# Patient Record
Sex: FEMALE | Race: WHITE | Employment: OTHER | URBAN - METROPOLITAN AREA
[De-identification: names, ages, dates, MRNs, and addresses within clinical notes are randomized per-mention and may not be internally consistent; named-entity substitution may affect disease eponyms.]

---

## 2017-03-21 PROBLEM — I67.2 CEREBRAL ATHEROSCLEROSIS: Status: ACTIVE | Noted: 2017-03-21

## 2017-03-21 PROBLEM — G62.89 PERIPHERAL NEUROPATHY DUE TO ISCHEMIA: Status: ACTIVE | Noted: 2017-03-21

## 2017-05-31 PROBLEM — Z86.73 HISTORY OF CEREBRAL INFARCTION: Status: ACTIVE | Noted: 2017-05-31

## 2018-01-16 PROBLEM — I63.9 CEREBROVASCULAR ACCIDENT (CVA) (HCC): Status: ACTIVE | Noted: 2018-01-16

## 2020-12-19 ENCOUNTER — HOSPITAL ENCOUNTER (EMERGENCY)
Age: 65
Discharge: ANOTHER HOSPITAL | End: 2020-12-19
Attending: EMERGENCY MEDICINE
Payer: COMMERCIAL

## 2020-12-19 ENCOUNTER — HOSPITAL ENCOUNTER (INPATIENT)
Age: 65
LOS: 2 days | Discharge: HOME | DRG: 065 | End: 2020-12-23
Attending: INTERNAL MEDICINE | Admitting: INTERNAL MEDICINE
Payer: COMMERCIAL

## 2020-12-19 ENCOUNTER — APPOINTMENT (OUTPATIENT)
Dept: CT IMAGING | Age: 65
End: 2020-12-19
Payer: COMMERCIAL

## 2020-12-19 VITALS
SYSTOLIC BLOOD PRESSURE: 160 MMHG | TEMPERATURE: 98 F | WEIGHT: 117 LBS | OXYGEN SATURATION: 98 % | HEIGHT: 66 IN | HEART RATE: 84 BPM | RESPIRATION RATE: 20 BRPM | DIASTOLIC BLOOD PRESSURE: 93 MMHG | BODY MASS INDEX: 18.8 KG/M2

## 2020-12-19 PROBLEM — R29.90 STROKE-LIKE SYMPTOMS: Status: ACTIVE | Noted: 2020-12-19

## 2020-12-19 PROBLEM — G62.9 PERIPHERAL NEUROPATHY: Status: ACTIVE | Noted: 2020-12-19

## 2020-12-19 PROBLEM — M62.838 MUSCLE SPASM: Status: ACTIVE | Noted: 2020-12-19

## 2020-12-19 PROBLEM — E03.9 HYPOTHYROIDISM: Status: ACTIVE | Noted: 2020-12-19

## 2020-12-19 PROBLEM — Z72.0 TOBACCO ABUSE: Status: ACTIVE | Noted: 2020-12-19

## 2020-12-19 LAB
ALBUMIN SERPL-MCNC: 4.3 G/DL (ref 3.5–5.2)
ALP BLD-CCNC: 66 U/L (ref 35–104)
ALT SERPL-CCNC: 12 U/L (ref 0–32)
ANION GAP SERPL CALCULATED.3IONS-SCNC: 8 MMOL/L (ref 7–16)
APTT: 34 SEC (ref 24.5–35.1)
AST SERPL-CCNC: 24 U/L (ref 0–31)
BASOPHILS ABSOLUTE: 0.03 E9/L (ref 0–0.2)
BASOPHILS RELATIVE PERCENT: 0.4 % (ref 0–2)
BILIRUB SERPL-MCNC: 0.5 MG/DL (ref 0–1.2)
BUN BLDV-MCNC: 13 MG/DL (ref 8–23)
CALCIUM SERPL-MCNC: 9.3 MG/DL (ref 8.6–10.2)
CHLORIDE BLD-SCNC: 107 MMOL/L (ref 98–107)
CO2: 25 MMOL/L (ref 22–29)
CREAT SERPL-MCNC: 0.7 MG/DL (ref 0.5–1)
EKG ATRIAL RATE: 78 BPM
EKG P AXIS: 87 DEGREES
EKG P-R INTERVAL: 128 MS
EKG Q-T INTERVAL: 392 MS
EKG QRS DURATION: 88 MS
EKG QTC CALCULATION (BAZETT): 446 MS
EKG R AXIS: 18 DEGREES
EKG T AXIS: 62 DEGREES
EKG VENTRICULAR RATE: 78 BPM
EOSINOPHILS ABSOLUTE: 0.03 E9/L (ref 0.05–0.5)
EOSINOPHILS RELATIVE PERCENT: 0.4 % (ref 0–6)
GFR AFRICAN AMERICAN: >60
GFR NON-AFRICAN AMERICAN: >60 ML/MIN/1.73
GLUCOSE BLD-MCNC: 114 MG/DL (ref 74–99)
HCT VFR BLD CALC: 43 % (ref 34–48)
HEMOGLOBIN: 14.1 G/DL (ref 11.5–15.5)
IMMATURE GRANULOCYTES #: 0.01 E9/L
IMMATURE GRANULOCYTES %: 0.1 % (ref 0–5)
INR BLD: 0.9
LYMPHOCYTES ABSOLUTE: 1.2 E9/L (ref 1.5–4)
LYMPHOCYTES RELATIVE PERCENT: 15.6 % (ref 20–42)
MCH RBC QN AUTO: 30.9 PG (ref 26–35)
MCHC RBC AUTO-ENTMCNC: 32.8 % (ref 32–34.5)
MCV RBC AUTO: 94.3 FL (ref 80–99.9)
METER GLUCOSE: 116 MG/DL (ref 74–99)
MONOCYTES ABSOLUTE: 0.37 E9/L (ref 0.1–0.95)
MONOCYTES RELATIVE PERCENT: 4.8 % (ref 2–12)
NEUTROPHILS ABSOLUTE: 6.04 E9/L (ref 1.8–7.3)
NEUTROPHILS RELATIVE PERCENT: 78.7 % (ref 43–80)
PDW BLD-RTO: 13.6 FL (ref 11.5–15)
PLATELET # BLD: 245 E9/L (ref 130–450)
PMV BLD AUTO: 9.9 FL (ref 7–12)
POTASSIUM REFLEX MAGNESIUM: 4 MMOL/L (ref 3.5–5)
PROTHROMBIN TIME: 10.8 SEC (ref 9.3–12.4)
RBC # BLD: 4.56 E12/L (ref 3.5–5.5)
SODIUM BLD-SCNC: 140 MMOL/L (ref 132–146)
TOTAL PROTEIN: 7 G/DL (ref 6.4–8.3)
TROPONIN: <0.01 NG/ML (ref 0–0.03)
WBC # BLD: 7.7 E9/L (ref 4.5–11.5)

## 2020-12-19 PROCEDURE — G0378 HOSPITAL OBSERVATION PER HR: HCPCS

## 2020-12-19 PROCEDURE — 6370000000 HC RX 637 (ALT 250 FOR IP): Performed by: NURSE PRACTITIONER

## 2020-12-19 PROCEDURE — 84484 ASSAY OF TROPONIN QUANT: CPT

## 2020-12-19 PROCEDURE — 80053 COMPREHEN METABOLIC PANEL: CPT

## 2020-12-19 PROCEDURE — 85025 COMPLETE CBC W/AUTO DIFF WBC: CPT

## 2020-12-19 PROCEDURE — 93010 ELECTROCARDIOGRAM REPORT: CPT | Performed by: INTERNAL MEDICINE

## 2020-12-19 PROCEDURE — G0379 DIRECT REFER HOSPITAL OBSERV: HCPCS

## 2020-12-19 PROCEDURE — 99284 EMERGENCY DEPT VISIT MOD MDM: CPT

## 2020-12-19 PROCEDURE — 70450 CT HEAD/BRAIN W/O DYE: CPT

## 2020-12-19 PROCEDURE — 2580000003 HC RX 258: Performed by: NURSE PRACTITIONER

## 2020-12-19 PROCEDURE — 85730 THROMBOPLASTIN TIME PARTIAL: CPT

## 2020-12-19 PROCEDURE — 93005 ELECTROCARDIOGRAM TRACING: CPT | Performed by: EMERGENCY MEDICINE

## 2020-12-19 PROCEDURE — 82962 GLUCOSE BLOOD TEST: CPT

## 2020-12-19 PROCEDURE — 85610 PROTHROMBIN TIME: CPT

## 2020-12-19 RX ORDER — PROMETHAZINE HYDROCHLORIDE 25 MG/1
12.5 TABLET ORAL EVERY 6 HOURS PRN
Status: DISCONTINUED | OUTPATIENT
Start: 2020-12-19 | End: 2020-12-23 | Stop reason: HOSPADM

## 2020-12-19 RX ORDER — LEVOCETIRIZINE DIHYDROCHLORIDE 5 MG/1
5 TABLET, FILM COATED ORAL NIGHTLY
COMMUNITY

## 2020-12-19 RX ORDER — GABAPENTIN 300 MG/1
300 CAPSULE ORAL EVERY 8 HOURS
Status: DISCONTINUED | OUTPATIENT
Start: 2020-12-19 | End: 2020-12-23 | Stop reason: HOSPADM

## 2020-12-19 RX ORDER — CETIRIZINE HYDROCHLORIDE 10 MG/1
10 TABLET ORAL NIGHTLY
Status: DISCONTINUED | OUTPATIENT
Start: 2020-12-19 | End: 2020-12-23 | Stop reason: HOSPADM

## 2020-12-19 RX ORDER — TIZANIDINE 4 MG/1
4 TABLET ORAL 3 TIMES DAILY
COMMUNITY

## 2020-12-19 RX ORDER — LEVOTHYROXINE SODIUM 0.05 MG/1
50 TABLET ORAL DAILY
Status: DISCONTINUED | OUTPATIENT
Start: 2020-12-20 | End: 2020-12-23 | Stop reason: HOSPADM

## 2020-12-19 RX ORDER — NABUMETONE 750 MG/1
750 TABLET, FILM COATED ORAL 2 TIMES DAILY
Status: ON HOLD | COMMUNITY
End: 2021-07-27 | Stop reason: HOSPADM

## 2020-12-19 RX ORDER — ACETAMINOPHEN 325 MG/1
650 TABLET ORAL EVERY 6 HOURS PRN
Status: DISCONTINUED | OUTPATIENT
Start: 2020-12-19 | End: 2020-12-23 | Stop reason: HOSPADM

## 2020-12-19 RX ORDER — ATORVASTATIN CALCIUM 40 MG/1
40 TABLET, FILM COATED ORAL NIGHTLY
Status: DISCONTINUED | OUTPATIENT
Start: 2020-12-19 | End: 2020-12-23 | Stop reason: HOSPADM

## 2020-12-19 RX ORDER — ASPIRIN 81 MG/1
81 TABLET ORAL 2 TIMES DAILY
Status: DISCONTINUED | OUTPATIENT
Start: 2020-12-19 | End: 2020-12-23 | Stop reason: HOSPADM

## 2020-12-19 RX ORDER — SODIUM CHLORIDE 0.9 % (FLUSH) 0.9 %
10 SYRINGE (ML) INJECTION EVERY 12 HOURS SCHEDULED
Status: DISCONTINUED | OUTPATIENT
Start: 2020-12-19 | End: 2020-12-23 | Stop reason: HOSPADM

## 2020-12-19 RX ORDER — POLYETHYLENE GLYCOL 3350 17 G/17G
17 POWDER, FOR SOLUTION ORAL DAILY PRN
Status: DISCONTINUED | OUTPATIENT
Start: 2020-12-19 | End: 2020-12-23 | Stop reason: HOSPADM

## 2020-12-19 RX ORDER — ACETAMINOPHEN 650 MG/1
650 SUPPOSITORY RECTAL EVERY 6 HOURS PRN
Status: DISCONTINUED | OUTPATIENT
Start: 2020-12-19 | End: 2020-12-23 | Stop reason: HOSPADM

## 2020-12-19 RX ORDER — TIZANIDINE 4 MG/1
4 TABLET ORAL EVERY 8 HOURS
Status: DISCONTINUED | OUTPATIENT
Start: 2020-12-19 | End: 2020-12-23 | Stop reason: HOSPADM

## 2020-12-19 RX ORDER — LEVOTHYROXINE SODIUM 0.05 MG/1
50 TABLET ORAL DAILY
COMMUNITY

## 2020-12-19 RX ORDER — SODIUM CHLORIDE 0.9 % (FLUSH) 0.9 %
10 SYRINGE (ML) INJECTION PRN
Status: DISCONTINUED | OUTPATIENT
Start: 2020-12-19 | End: 2020-12-23 | Stop reason: HOSPADM

## 2020-12-19 RX ORDER — ONDANSETRON 2 MG/ML
4 INJECTION INTRAMUSCULAR; INTRAVENOUS EVERY 6 HOURS PRN
Status: DISCONTINUED | OUTPATIENT
Start: 2020-12-19 | End: 2020-12-23 | Stop reason: HOSPADM

## 2020-12-19 RX ADMIN — SODIUM CHLORIDE, PRESERVATIVE FREE 10 ML: 5 INJECTION INTRAVENOUS at 21:02

## 2020-12-19 RX ADMIN — ASPIRIN 81 MG: 81 TABLET, COATED ORAL at 21:00

## 2020-12-19 RX ADMIN — ATORVASTATIN CALCIUM 40 MG: 40 TABLET, FILM COATED ORAL at 21:00

## 2020-12-19 RX ADMIN — GABAPENTIN 300 MG: 300 CAPSULE ORAL at 21:01

## 2020-12-19 RX ADMIN — TIZANIDINE 4 MG: 4 TABLET ORAL at 21:01

## 2020-12-19 RX ADMIN — CETIRIZINE HYDROCHLORIDE 10 MG: 10 TABLET, FILM COATED ORAL at 21:10

## 2020-12-19 ASSESSMENT — ENCOUNTER SYMPTOMS
VOMITING: 0
SORE THROAT: 0
NAUSEA: 0
SHORTNESS OF BREATH: 0
EYE PAIN: 0
BACK PAIN: 0
ABDOMINAL PAIN: 0

## 2020-12-19 ASSESSMENT — PAIN SCALES - GENERAL: PAINLEVEL_OUTOF10: 0

## 2020-12-19 NOTE — PROGRESS NOTES
Received call from [facility] @ 6916 with [facility]. Pt going to  # [phone]. Nurse to call report to: [phone]. RN Ricarda Goodell to review transport needs.

## 2020-12-19 NOTE — ED PROVIDER NOTES
Patient is 59-year-old female presenting to the emergency department with left arm weakness as well as left leg weakness. Patient has an NIH upon arrival 6. Last known well was Thursday night at 2300 hrs. This puts her out of the window for TPA and out of the window for [facility] alert. Patient states he had that weakness or noticed it starting Friday morning has not ambulated since then. Patient denies any nausea, vomiting, fever, chills. Nothing makes her symptoms worse. Nothing makes them better. She states she does not take any blood thinners and she has a past history of smoking 1/2 pack/day for over 30 years. Patient denies any pain anywhere. Review of Systems   Constitutional: Negative for chills and fever. HENT: Negative for ear pain and sore throat. Eyes: Negative for pain. Respiratory: Negative for shortness of breath. Cardiovascular: Negative for chest pain. Gastrointestinal: Negative for abdominal pain, nausea and vomiting. Genitourinary: Negative for flank pain and pelvic pain. Musculoskeletal: Negative for back pain and neck pain. Skin: Negative for rash. Neurological: Positive for weakness. Negative for facial asymmetry, light-headedness and headaches. Physical Exam  Vitals signs and nursing note reviewed. Constitutional:       Appearance: She is well-developed. HENT:      Head: Normocephalic and atraumatic. Right Ear: External ear normal.      Left Ear: External ear normal.      Nose: Nose normal.      Mouth/Throat:      Mouth: Mucous membranes are moist.      Pharynx: Oropharynx is clear. Eyes:      Pupils: Pupils are equal, round, and reactive to light. Neck:      Musculoskeletal: Normal range of motion and neck supple. Cardiovascular:      Rate and Rhythm: Normal rate and regular rhythm. Heart sounds: Normal heart sounds. Pulmonary:      Effort: Pulmonary effort is normal. No respiratory distress.       Breath sounds: Normal breath sounds. Abdominal:      Palpations: Abdomen is soft. Tenderness: There is no abdominal tenderness. Musculoskeletal:         General: No swelling or tenderness. Skin:     General: Skin is warm and dry. Findings: No rash. Neurological:      Mental Status: She is alert and oriented to person, place, and time. Cranial Nerves: No cranial nerve deficit. Sensory: No sensory deficit. Motor: Weakness present. Coordination: Finger-Nose-Finger Test abnormal and Heel to [employer] abnormal.      Comments: Patient has NIH stroke scale of 6. Patient has left leg weakness some effort antigravity left arm weakness. He has ataxia of the left leg left arm with sensation loss or change on the left arm left leg. Procedures     EKG: This EKG is signed and interpreted by me. Rate: 78  Rhythm: Sinus  Interpretation: no acute changes  Comparison: stable as compared to patient's most recent EKG       MDM  Number of Diagnoses or Management Options  Left arm weakness  Left leg weakness  Stroke-like symptoms  Diagnosis management comments: 1     Patient is a 58-year-old female with past history significant for multiple TIAs in the past presented to the emergency department due to new onset of left-sided weakness that she first noticed Friday morning. Last known well for the patient was Thursday night at 2300 hrs. Patient does have an NIH scale score of 6 due to the left arm left leg weakness with decreased sensation in the left arm left leg. Patient had no facial weakness or facial asymmetry or dysarthria. CT scan of the head was conducted due to the patient strokelike symptoms that did not find any abnormalities  However this is not sensitive enough to rule out a subacute stroke.   Due to this ongoing patient's weakness the patient will be admitted to the hospital and transferred down to [facility] for further neurology evaluation and likely MRI imaging for stroke evaluation. Patient was out of the window for TPA or [facility] alert. Laboratory evaluation did not find any signs of acute infection be causing the symptoms. In addition patient did not have any Covid-like symptoms. --------------------------------------------- PAST HISTORY ---------------------------------------------  Past Medical History:  has a past medical history of Anxiety, Arthritis, Cancer ([address] Headache, and Numbness and tingling of both legs. Past Surgical History:  has a past surgical history that includes Tonsillectomy; Appendectomy; and  section. Social History:  reports that she has been smoking cigarettes. She has been smoking about 0.50 packs per day. She has never used smokeless tobacco. She reports current alcohol use. She reports that she does not use drugs. Family History: family history is not on file. The patients home medications have been reviewed.     Allergies: Aspirin and Garlic oil    -------------------------------------------------- RESULTS -------------------------------------------------    Lab  Results for orders placed or performed during the hospital encounter of 20   CBC Auto Differential   Result Value Ref Range    WBC 7.7 4.5 - 11.5 E9/L    RBC 4.56 3.50 - 5.50 E12/L    Hemoglobin 14.1 11.5 - 15.5 g/dL    Hematocrit 43.0 34.0 - 48.0 %    MCV 94.3 80.0 - 99.9 fL    MCH 30.9 26.0 - 35.0 pg    MCHC 32.8 32.0 - 34.5 %    RDW 13.6 11.5 - 15.0 fL    Platelets 174 328 - 362 E9/L    MPV 9.9 7.0 - 12.0 fL    Neutrophils % 78.7 43.0 - 80.0 %    Immature Granulocytes % 0.1 0.0 - 5.0 %    Lymphocytes % 15.6 (L) 20.0 - 42.0 %    Monocytes % 4.8 2.0 - 12.0 %    Eosinophils % 0.4 0.0 - 6.0 %    Basophils % 0.4 0.0 - 2.0 %    Neutrophils Absolute 6.04 1.80 - 7.30 E9/L    Immature Granulocytes # 0.01 E9/L    Lymphocytes Absolute 1.20 (L) 1.50 - 4.00 E9/L    Monocytes Absolute 0.37 0.10 - 0.95 E9/L    Eosinophils Absolute 0.03 (L) 0.05 - 0.50 E9/L Saturation Interpretation: Normal      ------------------------------------------ PROGRESS NOTES ------------------------------------------  Re-evaluation(s):  Time: 1350. Patients symptoms show no change  Repeat physical examination is not changed    Time: 145. Patients symptoms show no change  Repeat physical examination is not changed    I have spoken with the patient and discussed todays results, in addition to providing specific details for the plan of care and counseling regarding the diagnosis and prognosis. Their questions are answered at this time and they are agreeable with the plan. I have discussed the risks and benefits of transfer and they wish to proceed with the transfer. --------------------------------- ADDITIONAL PROVIDER NOTES ---------------------------------  Consultations:  Spoke with Dr. Saqib Shahid (Medicine). Discussed case. They will admit this patient. Reason for transfer: Neurologic Evaluation and treatment. This patient's ED course included: a personal history and physicial examination, re-evaluation prior to disposition, multiple bedside re-evaluations, IV medications, cardiac monitoring, continuous pulse oximetry and complex medical decision making and emergency management    This patient has remained hemodynamically stable and been closely monitored during their ED course. Please note that the withdrawal or failure to initiate urgent interventions for this patient would likely result in a life threatening deterioration or permanent disability. Clinical Impression  1. Stroke-like symptoms    2. Left arm weakness    3. Left leg weakness          Disposition  Patient's disposition: Transfer to [place] [address].  Transferred by: Dr. Gayle Yusuf . Patient's condition is stable.                   Ramón Osorio DO  Resident  12/19/20 8011

## 2020-12-19 NOTE — ED NOTES
@1922 [facility] center was notified to transfer the patient to [facility]  12/19/20 0413

## 2020-12-20 ENCOUNTER — APPOINTMENT (OUTPATIENT)
Dept: ULTRASOUND IMAGING | Age: 65
DRG: 065 | End: 2020-12-20
Attending: INTERNAL MEDICINE
Payer: COMMERCIAL

## 2020-12-20 ENCOUNTER — APPOINTMENT (OUTPATIENT)
Dept: MRI IMAGING | Age: 65
DRG: 065 | End: 2020-12-20
Attending: INTERNAL MEDICINE
Payer: COMMERCIAL

## 2020-12-20 PROBLEM — I63.81 RIGHT THALAMIC STROKE (HCC): Status: ACTIVE | Noted: 2018-01-16

## 2020-12-20 LAB
ANION GAP SERPL CALCULATED.3IONS-SCNC: 12 MMOL/L (ref 7–16)
BUN BLDV-MCNC: 17 MG/DL (ref 8–23)
CALCIUM SERPL-MCNC: 9 MG/DL (ref 8.6–10.2)
CHLORIDE BLD-SCNC: 106 MMOL/L (ref 98–107)
CHOLESTEROL, TOTAL: 193 MG/DL (ref 0–199)
CO2: 25 MMOL/L (ref 22–29)
CREAT SERPL-MCNC: 0.9 MG/DL (ref 0.5–1)
GFR AFRICAN AMERICAN: >60
GFR NON-AFRICAN AMERICAN: >60 ML/MIN/1.73
GLUCOSE BLD-MCNC: 77 MG/DL (ref 74–99)
HBA1C MFR BLD: 5.4 % (ref 4–5.6)
HCT VFR BLD CALC: 42.5 % (ref 34–48)
HDLC SERPL-MCNC: 62 MG/DL
HEMOGLOBIN: 14.1 G/DL (ref 11.5–15.5)
LDL CHOLESTEROL CALCULATED: 103 MG/DL (ref 0–99)
MCH RBC QN AUTO: 31.1 PG (ref 26–35)
MCHC RBC AUTO-ENTMCNC: 33.2 % (ref 32–34.5)
MCV RBC AUTO: 93.6 FL (ref 80–99.9)
PDW BLD-RTO: 13.4 FL (ref 11.5–15)
PLATELET # BLD: 242 E9/L (ref 130–450)
PMV BLD AUTO: 10.6 FL (ref 7–12)
POTASSIUM REFLEX MAGNESIUM: 3.7 MMOL/L (ref 3.5–5)
RBC # BLD: 4.54 E12/L (ref 3.5–5.5)
SODIUM BLD-SCNC: 143 MMOL/L (ref 132–146)
TRIGL SERPL-MCNC: 139 MG/DL (ref 0–149)
VLDLC SERPL CALC-MCNC: 28 MG/DL
WBC # BLD: 5.9 E9/L (ref 4.5–11.5)

## 2020-12-20 PROCEDURE — 6370000000 HC RX 637 (ALT 250 FOR IP): Performed by: NURSE PRACTITIONER

## 2020-12-20 PROCEDURE — G0378 HOSPITAL OBSERVATION PER HR: HCPCS

## 2020-12-20 PROCEDURE — 80048 BASIC METABOLIC PNL TOTAL CA: CPT

## 2020-12-20 PROCEDURE — 6370000000 HC RX 637 (ALT 250 FOR IP): Performed by: PSYCHIATRY & NEUROLOGY

## 2020-12-20 PROCEDURE — 97162 PT EVAL MOD COMPLEX 30 MIN: CPT | Performed by: PHYSICAL THERAPIST

## 2020-12-20 PROCEDURE — 80061 LIPID PANEL: CPT

## 2020-12-20 PROCEDURE — 97530 THERAPEUTIC ACTIVITIES: CPT | Performed by: PHYSICAL THERAPIST

## 2020-12-20 PROCEDURE — 85027 COMPLETE CBC AUTOMATED: CPT

## 2020-12-20 PROCEDURE — 36415 COLL VENOUS BLD VENIPUNCTURE: CPT

## 2020-12-20 PROCEDURE — 99221 1ST HOSP IP/OBS SF/LOW 40: CPT | Performed by: PSYCHIATRY & NEUROLOGY

## 2020-12-20 PROCEDURE — 93880 EXTRACRANIAL BILAT STUDY: CPT

## 2020-12-20 PROCEDURE — 93880 EXTRACRANIAL BILAT STUDY: CPT | Performed by: RADIOLOGY

## 2020-12-20 PROCEDURE — 70551 MRI BRAIN STEM W/O DYE: CPT

## 2020-12-20 PROCEDURE — 83036 HEMOGLOBIN GLYCOSYLATED A1C: CPT

## 2020-12-20 PROCEDURE — 2580000003 HC RX 258: Performed by: NURSE PRACTITIONER

## 2020-12-20 PROCEDURE — 97166 OT EVAL MOD COMPLEX 45 MIN: CPT | Performed by: OCCUPATIONAL THERAPIST

## 2020-12-20 PROCEDURE — 97112 NEUROMUSCULAR REEDUCATION: CPT | Performed by: OCCUPATIONAL THERAPIST

## 2020-12-20 RX ORDER — CLOPIDOGREL BISULFATE 75 MG/1
75 TABLET ORAL DAILY
Status: DISCONTINUED | OUTPATIENT
Start: 2020-12-20 | End: 2020-12-23 | Stop reason: HOSPADM

## 2020-12-20 RX ADMIN — GABAPENTIN 300 MG: 300 CAPSULE ORAL at 14:42

## 2020-12-20 RX ADMIN — SODIUM CHLORIDE, PRESERVATIVE FREE 10 ML: 5 INJECTION INTRAVENOUS at 20:14

## 2020-12-20 RX ADMIN — TIZANIDINE 4 MG: 4 TABLET ORAL at 03:55

## 2020-12-20 RX ADMIN — CLOPIDOGREL 75 MG: 75 TABLET, FILM COATED ORAL at 14:42

## 2020-12-20 RX ADMIN — TIZANIDINE 4 MG: 4 TABLET ORAL at 20:14

## 2020-12-20 RX ADMIN — SODIUM CHLORIDE, PRESERVATIVE FREE 10 ML: 5 INJECTION INTRAVENOUS at 08:45

## 2020-12-20 RX ADMIN — ATORVASTATIN CALCIUM 40 MG: 40 TABLET, FILM COATED ORAL at 20:14

## 2020-12-20 RX ADMIN — TIZANIDINE 4 MG: 4 TABLET ORAL at 14:42

## 2020-12-20 RX ADMIN — LEVOTHYROXINE SODIUM 50 MCG: 0.05 TABLET ORAL at 06:36

## 2020-12-20 RX ADMIN — ASPIRIN 81 MG: 81 TABLET, COATED ORAL at 08:43

## 2020-12-20 RX ADMIN — ASPIRIN 81 MG: 81 TABLET, COATED ORAL at 20:14

## 2020-12-20 RX ADMIN — CETIRIZINE HYDROCHLORIDE 10 MG: 10 TABLET, FILM COATED ORAL at 20:14

## 2020-12-20 RX ADMIN — GABAPENTIN 300 MG: 300 CAPSULE ORAL at 20:15

## 2020-12-20 RX ADMIN — GABAPENTIN 300 MG: 300 CAPSULE ORAL at 03:55

## 2020-12-20 ASSESSMENT — PAIN SCALES - GENERAL: PAINLEVEL_OUTOF10: 0

## 2020-12-20 NOTE — PLAN OF CARE
Problem: HEMODYNAMIC STATUS  Goal: Patient has stable vital signs and fluid balance  Outcome: Met This Shift     Problem: ACTIVITY INTOLERANCE/IMPAIRED MOBILITY  Goal: Mobility/activity is maintained at optimum level for patient  Outcome: Met This Shift     Problem: COMMUNICATION IMPAIRMENT  Goal: Ability to express needs and understand communication  Outcome: Met This Shift     Problem: Skin Integrity:  Goal: Will show no infection signs and symptoms  Description: Will show no infection signs and symptoms  Outcome: Met This Shift  Goal: Absence of new skin breakdown  Description: Absence of new skin breakdown  Outcome: Met This Shift

## 2020-12-20 NOTE — PROGRESS NOTES
Physical Therapy    Physical Therapy Initial Assessment     Name: Sadiq Santamaria  : 1955  MRN: [id number]    Referring Provider:  KAMI Loredo CNP    Date of Service: 2020    Evaluating PT:  Rocael Nida PT, DPT [id number]      Room #:  5180/8414-Y  Diagnosis:  Stroke-like symptoms  PMHx/PSHx:  Arthritis, anxiety, numbness and tingling of B LEs, CA, headaches  Procedure/Surgery:  None this admission  Precautions:  Falls, severe L UE and L LE coordination impairments, bed alarm  Equipment Needs:  TBD    SUBJECTIVE:    Pt lives with her nephew in a first floor apartment with 4 stairs down to enter and no rail through rear, 1 rail through front entrance. Bed is on first floor and bath is on first floor. Pt ambulated with no AD prior to admission. Pt reports full independence with mobility and ADLs at baseline    OBJECTIVE:   Initial Evaluation  Date: 20 Treatment Short Term/ Long Term   Goals   AM-PAC 6 Clicks 92/84     Was pt agreeable to Eval/treatment? yes     Does pt have pain? No c/o pain     Bed Mobility  Rolling: SBA  Supine to sit: SBA  Sit to supine: Min A  Scooting: SBA  Rolling: Mod Independent   Supine to sit: Mod Independent   Sit to supine: Mod Independent   Scooting: Mod Independent    Transfers Sit to stand: Mod A  Stand to sit: Mod A  Stand pivot: NT  Sit to stand: Supervision  Stand to sit: Supervision  Stand pivot: Supervision with AAD   Ambulation    2 side steps with no AD and Max A  50 feet with AAD with Supervision   Stair negotiation: ascended and descended  NT  TBD   ROM BUE:  WNL  BLE:  WNL     Strength BUE:  WNL  R LE:  4+/5  L LE: 4/5     Balance Sitting EOB:  CGA  Dynamic Standing:   Max A  Sitting EOB:  Independent  Dynamic Standing:  Supervision with AAD     Pt is A & O x 4  Sensation: decreased sensation to L LE- absent light touch distal to knee, diminished proximal to knee to lateral hip  Edema:  Unremarkable Coordination: R UE and R LE intact with finger to nose and tracing square on floor with R LE. Substantially impaired accuracy and decreased speed with both finger to nose and tracing square with L UE and LE respectively.     Vitals:  Blood Pressure at rest 157/92   Heart Rate at rest 82   SPO2 at rest 96% on room air     Therapeutic Exercises:    Pt educated in slow, controlled ankle ROM and heel slides for improved coordination of movements while in bed between sessions- completed 5x with AAROM for technique  Pt encouraged to use L UE as much as possible with functional tasks such as feeding and object retrieval to facilitate improved coorindation    Patient education  Pt educated on role of therapy, objective findings, safety with transfers and mobility, coordination training tasks     Patient response to education:   Pt verbalized understanding Pt demonstrated skill Pt requires further education in this area   yes yes yes     ASSESSMENT: Patient and or family understand(s) diagnosis, prognosis, and plan of care. yes    PLAN OF CARE:    Current Treatment Recommendations   [x] Strengthening     [x] ROM   [x] Balance Training   [x] Endurance Training   [x] Transfer Training   [x] Gait Training   [x] Stair Training   [x] Positioning   [x] Safety and Education Training   [x] Patient/Caregiver Education   [] HEP  [] Other       PT care will be provided in accordance with the objectives noted above. Whenever appropriate, clear delegation orders will be provided for nursing staff. Exercises and functional mobility practice will be used as well as appropriate assistive devices or modalities to obtain goals. Patient and family education will also be administered as needed. Frequency of treatments: 2-5x/week x 7-10 days. Time in  0741  Time out  0801    Total Treatment Time  10 minutes     Evaluation Time includes thorough review of current medical information, gathering information on past medical history/social history and prior level of function, completion of standardized testing/informal observation of tasks, assessment of data and education on plan of care and goals.     CPT codes:  [] Low Complexity PT evaluation 89461  [x] Moderate Complexity PT evaluation 55222  [] High Complexity PT evaluation 73338  [] PT Re-evaluation 47942  [] Gait training 59882 0 minutes  [] Manual therapy 43057 0 minutes  [x] Therapeutic activities 43758 10 minutes  [] Therapeutic exercises 07841 0 minutes  [] Neuromuscular reeducation 91082 0 minutes     Juan Francisco Gutiérrez, PT, DPT  [id number]

## 2020-12-20 NOTE — H&P
[address] [facility]  History and Physical      CHIEF COMPLAINT: Weakness      HISTORY OF PRESENT ILLNESS:      The patient is a 72 y.o. female patient of Dr. ROMERO history of anxiety, chronic headache, paresthesias who presents with left-sided weakness. Patient presented to the ED yesterday with complaints of left-sided weakness. Last known well Thursday night11 p.m. Initial NIH in ED was 6. Patient is not candidate for TPA due to being outside window. She denies any chest pain or palpitations. Denies any speech or vision problems. No nausea vomiting fever chills. Denies COVID-19 exposure.     Past Medical History:    Past Medical History:   Diagnosis Date    Anxiety     Arthritis     Cancer ([facility] [address]     Headache     3-6 per month; 2/10 on the pain scale    Numbness and tingling of both legs        Past Surgical History:    Past Surgical History:   Procedure Laterality Date   [address], [place], [facility]     SECTION      x 3    TONSILLECTOMY         Medications Prior to Admission:    Medications Prior to Admission: tiZANidine (ZANAFLEX) 4 MG tablet, Take 4 mg by mouth 3 times daily  nabumetone (RELAFEN) 750 MG tablet, Take 750 mg by mouth 2 times daily  levothyroxine (SYNTHROID) 50 MCG tablet, Take 50 mcg by mouth Daily  levocetirizine (XYZAL) 5 MG tablet, Take 5 mg by mouth nightly  aspirin (ECOTRIN LOW STRENGTH) 81 MG EC tablet, Take 1 tablet by mouth 2 times daily  gabapentin (NEURONTIN) 300 MG capsule, Take 300 mg by mouth 3 times daily  etodolac (LODINE) 400 MG tablet, Take 400 mg by mouth daily  Multiple Vitamins-Minerals (VISION-ROLY PRESERVE PO), Take 2 tablets by mouth daily  [DISCONTINUED] B Complex-C-Folic Acid (RENAL) 1 MG CAPS, Take 1 capsule by mouth daily  [DISCONTINUED] b complex-C-folic acid (NEPHROCAPS) 1 MG capsule, Take 1 capsule by mouth daily MONOPCT 4.8 12/19/2020    BASOPCT 0.4 12/19/2020    MONOSABS 0.37 12/19/2020    LYMPHSABS 1.20 12/19/2020    EOSABS 0.03 12/19/2020    BASOSABS 0.03 12/19/2020     CMP:    Lab Results   Component Value Date     12/20/2020    K 3.7 12/20/2020     12/20/2020    CO2 25 12/20/2020    BUN 17 12/20/2020    CREATININE 0.9 12/20/2020    GFRAA >60 12/20/2020    LABGLOM >60 12/20/2020    GLUCOSE 77 12/20/2020    GLUCOSE 93 02/20/2012    PROT 7.0 12/19/2020    LABALBU 4.3 12/19/2020    LABALBU 4.7 02/20/2012    CALCIUM 9.0 12/20/2020    BILITOT 0.5 12/19/2020    ALKPHOS 66 12/19/2020    AST 24 12/19/2020    ALT 12 12/19/2020     Magnesium:    Lab Results   Component Value Date    MG 2.0 10/24/2011     Phosphorus:  No results found for: PHOS  PT/INR:    Lab Results   Component Value Date    PROTIME 10.8 12/19/2020    PROTIME 11.2 10/24/2011    INR 0.9 12/19/2020     Last 3 Troponin:    Lab Results   Component Value Date    TROPONINI <0.01 12/19/2020    TROPONINI <0.01 10/24/2011     U/A:  No results found for: NITRITE, COLORU, PROTEINU, PHUR, LABCAST, WBCUA, RBCUA, MUCUS, TRICHOMONAS, YEAST, BACTERIA, CLARITYU, SPECGRAV, LEUKOCYTESUR, UROBILINOGEN, BILIRUBINUR, BLOODU, GLUCOSEU, AMORPHOUS  ABG:  No results found for: PH, PCO2, PO2, HCO3, BE, THGB, TCO2, O2SAT  HgBA1c:    Lab Results   Component Value Date    LABA1C 5.4 12/20/2020     FLP:    Lab Results   Component Value Date    TRIG 139 12/20/2020    HDL 62 12/20/2020    LDLCALC 103 12/20/2020    LABVLDL 28 12/20/2020     TSH:    Lab Results   Component Value Date    TSH 3.538 02/20/2012       MRI BRAIN WO CONTRAST   Final Result   1. Acute ischemic lacunar infarct in the right thalamus. 2. No intracranial hemorrhage or mass effect. 3. Mild-to-moderate chronic white matter microvascular ischemic changes.       US CAROTID ARTERY BILATERAL    (Results Pending)       ASSESSMENT:      Principal Problem:    Stroke-like symptoms  Active Problems: History of cerebral infarction    Tobacco abuse    Peripheral neuropathy    Hypothyroidism    Muscle spasm  Resolved Problems:    * No resolved hospital problems.  *      PLAN:    29-year-old female history of anxiety and hypothyroidism admitted with acute CVA    Admit Tele  DAPT  Statin  MRI right thalamic lacunar infarct  carotid US  Echo  Neurology consult appreciated  Medications for other co morbidities cont as appropriate w dosage adjustments as necessary   PT/OT  D/C planning            Electronically signed by Stacy Jones MD on 12/20/2020 at 2:05 PM

## 2020-12-20 NOTE — CONSULTS
Rx listed below.  Preferred pharmacy has been set up and verified.      Zain Harper is a 72 y.o. female       No chief complaint on file. CC: STroke   HPI:  72year old woman presents with  left sided weakness. She was outside of TPA window. Initial CT shows faint hypodensity in the right internal capsule consistent with subacute lacunar infarct. She is a smoker. Blood pressures were mildly elevated on presentation. Previous pt of Dr Rickey Chester for neuropathy and headaches. Symptoms were first noticed when she woke up Thursday morning. There has been mild improvement in strength but she still has significant sensory changes and inability to control left arm. Of note she denies history of cancer. HPI  Prior to Visit Medications    Medication Sig Taking? Authorizing Provider   tiZANidine (ZANAFLEX) 4 MG tablet Take 4 mg by mouth 3 times daily Yes Historical Provider, MD   nabumetone (RELAFEN) 750 MG tablet Take 750 mg by mouth 2 times daily Yes Historical Provider, MD   levothyroxine (SYNTHROID) 50 MCG tablet Take 50 mcg by mouth Daily Yes Historical Provider, MD   levocetirizine (XYZAL) 5 MG tablet Take 5 mg by mouth nightly Yes Historical Provider, MD   aspirin (ECOTRIN LOW STRENGTH) 81 MG EC tablet Take 1 tablet by mouth 2 times daily Yes Tal Tuttle MD   gabapentin (NEURONTIN) 300 MG capsule Take 300 mg by mouth 3 times daily Yes Historical Provider, MD   etodolac (LODINE) 400 MG tablet Take 400 mg by mouth daily Yes Historical Provider, MD   Multiple Vitamins-Minerals (VISION-ROLY PRESERVE PO) Take 2 tablets by mouth daily Yes Historical Provider, MD     Social History     Tobacco Use    Smoking status: Current Every Day Smoker     Packs/day: 0.50     Types: Cigarettes    Smokeless tobacco: Never Used   Substance Use Topics    Alcohol use: Yes     Comment: rarely    Drug use: No     History reviewed. No pertinent family history.   Past Surgical History:   Procedure Laterality Date   [facility]  SECTION      x 3    TONSILLECTOMY       Past Medical History:   Diagnosis Date    Anxiety     Arthritis     Cancer ([address]     Headache     3-6 per month; 2/10 on the pain scale    Numbness and tingling of both legs      Review of Systems    As stated above. Objective:   BP (!) 157/92   Pulse 85   Temp 97.7 °F (36.5 °C)   Resp 16   Ht 5' 6\" (1.676 m)   Wt 117 lb 6.4 oz (53.3 kg)   SpO2 95%   BMI 18.95 kg/m²     Physical Exam  Constitutional:       Appearance: She is not ill-appearing. HENT:      Head: Normocephalic and atraumatic. Mouth/Throat:      Mouth: Mucous membranes are moist.      Pharynx: Oropharynx is clear. Eyes:      General: Lids are normal.      Extraocular Movements: Extraocular movements intact. Conjunctiva/sclera: Conjunctivae normal.      Pupils: Pupils are equal, round, and reactive to light. Cardiovascular:      Rate and Rhythm: Regular rhythm. Abdominal:      Palpations: Abdomen is soft. Skin:     General: Skin is warm and dry. Neurological:      Mental Status: She is alert. Deep Tendon Reflexes:      Reflex Scores:       Tricep reflexes are 1+ on the right side and 1+ on the left side. Bicep reflexes are 1+ on the right side and 1+ on the left side. Patellar reflexes are 1+ on the right side and 1+ on the left side. Achilles reflexes are 1+ on the right side and 1+ on the left side. Psychiatric:         Mood and Affect: Mood normal.         Speech: Speech normal.         Behavior: Behavior normal.                 Neurological Exam  Mental Status  Alert. Oriented to person, place, time and situation. Recent and remote memory are intact. Speech is normal. Language is fluent with no aphasia. Fund of knowledge is appropriate for level of education. Cranial Nerves  CN II: Visual fields full to confrontation. CN III, IV, VI: Extraocular movements intact bilaterally. Normal lids and orbits bilaterally. Pupils equal round and reactive to light bilaterally. CN V:  Left: Diminished sensation of the entire left side of the face. CN VII: Full and symmetric facial movement. CN VIII: Hearing is normal.  CN IX, X: Palate elevates symmetrically. Normal gag reflex. CN XI: Shoulder shrug strength is normal.  CN XII: Tongue midline without atrophy or fasciculations. Motor  Normal muscle bulk throughout. Normal muscle tone. No abnormal involuntary movements. 5-/5 left arm  4/5 left leg  Significantly decreased dexterity left hand. Sensory  Left hemisensory loss. Reflexes                                           Right                      Left  Biceps                                 1+                         1+  Triceps                                1+                         1+  Patellar                                1+                         1+  Achilles                                1+                         1+    Coordination  Sensory ataxia on the left.  .      Laboratory/Radiology:     CBC with Differential:    Lab Results   Component Value Date    WBC 5.9 12/20/2020    RBC 4.54 12/20/2020    HGB 14.1 12/20/2020    HCT 42.5 12/20/2020     12/20/2020    MCV 93.6 12/20/2020    MCH 31.1 12/20/2020    MCHC 33.2 12/20/2020    RDW 13.4 12/20/2020    SEGSPCT 85 12/22/2012    LYMPHOPCT 15.6 12/19/2020    MONOPCT 4.8 12/19/2020    BASOPCT 0.4 12/19/2020    MONOSABS 0.37 12/19/2020    LYMPHSABS 1.20 12/19/2020    EOSABS 0.03 12/19/2020    BASOSABS 0.03 12/19/2020     CMP:    Lab Results   Component Value Date     12/20/2020    K 3.7 12/20/2020     12/20/2020    CO2 25 12/20/2020    BUN 17 12/20/2020    CREATININE 0.9 12/20/2020    GFRAA >60 12/20/2020    LABGLOM >60 12/20/2020    GLUCOSE 77 12/20/2020    GLUCOSE 93 02/20/2012    PROT 7.0 12/19/2020    LABALBU 4.3 12/19/2020 LABALBU 4.7 02/20/2012    CALCIUM 9.0 12/20/2020    BILITOT 0.5 12/19/2020    ALKPHOS 66 12/19/2020    AST 24 12/19/2020    ALT 12 12/19/2020     HgBA1c:    Lab Results   Component Value Date    LABA1C 5.4 12/20/2020     FLP:    Lab Results   Component Value Date    TRIG 139 12/20/2020    HDL 62 12/20/2020    LDLCALC 103 12/20/2020    LABVLDL 28 12/20/2020       CT head:      I independently reviewed the labs and imaging studies at today's appointment. Assessment:       Lacunar infarct likely mechanism small vessel disease. Plan:     dapt 21 days. Baby aspirin thereafter  High intensity statin. Follow up carotid us. Stroke clinic followup   Stressed importance of smoking cessation.        Patrick Rank  11:04 AM  12/20/2020

## 2020-12-20 NOTE — PROGRESS NOTES
Occupational Therapy      OT evaluation completed his date. Patient will benefit from PMR consult to assess further post-acute rehab needs.     Sarai Martinez OTR/L; [id number]

## 2020-12-20 NOTE — PROGRESS NOTES
Attempted to see pt. Down for studies. CT reviewed showing subacute lacunar infarct in right internal capsule. Will attempt to see after studies.    Recommend DAPT  Statin

## 2020-12-20 NOTE — PROGRESS NOTES
Occupational Therapy  OCCUPATIONAL THERAPY INITIAL EVALUATION      Date:2020  Patient Name: Moraima Rao  MRN: [id number]  : 1955  Room: [address] [place], [place] [address]    Referring Provider: KAMI Loredo CNP    OTMRS Modified Christiana Scale (MRS)    Score:   4    Evaluating OT: Viri Ruelas OTR/L; [id number]    AM-PAC Daily Activity Raw Score: 14    Recommended Adaptive Equipment: continue to assess    Diagnosis: Stroke like symptoms     Pertinent Medical History: Anxiety. Arthritis, Headache, CA, Numbness B LE     Precautions:  Severe Fall Risk, Poor coordination LUE/LLE w/ tonal changes, Bed alarm     Home Living: Pt lives w/ her nephew in a (1) floor Apt, 3-4 steps to enter. Bathroom setup: Tub/shower combo; higher toilet seat   Equipment owned: none    Prior Level of Function: Independent with ADLs , Independent with IADLs; ambulated Independent without assistive device.    Driving: yes  Occupation: retired    Pain Level: no c/o pain  Cognition: A&O: 4/4; Follows 1-2 step directions   Memory:  good   Sequencing: good   Problem solving:  Fair   Judgement/safety:  Fair     Functional Assessment:   Initial Eval Status  Date: 20 Treatment Status  Date: STGs = LTGs  Time frame: 10  days   Feeding Set-up   Independent   Grooming Minimal Assist; seated; Poor function L UE as assist  Supervision    UB Dressing Moderate Assist   Minimal Assist    LB Dressing Maximal Assist ; Debbie R sock; otherwise MaxA  Moderate Assist    Bathing Maximal Assist  Minimal Assist    Toileting Moderate Assist   Minimal Assist    Bed Mobility  Supine to sit: Contact Guard Assist   Sit to supine: Minimal Assist   Supine to sit: Supervision   Sit to supine: Supervision    Functional Transfers Moderate/Maximal Assist for sit to stand from bed; Required Max assist to position and support L LE (flutuating tone L LE /inversion @ ankle/ w/ effort)  Minimal Assist Functional Mobility Unable to complete functional mobility this date. Side step to [facility] MaxA   Moderate Assist    Balance Sitting:     Static:  CGA lean to Left    Dynamic:Debbie during LE ADL tasks  Standing: MaxA     Activity Tolerance Fair  Good for participation in ADLs   Visual/  Perceptual Glasses: yes; No acute visual changes          Hand Dominance Right   Strength AROM Additional Info:    RUE  5/5  WFL good  and wfl FMC/dexterity noted during ADL tasks       LUE 3-/5; proximal; 3/5 distal  WFL Fair-  and Poor FMC/dexterity noted during ADL tasks. Gross/Fine motor Tonal changes/Involuntary movements during functional tasks. Dysmetria with grasp of objects. Spontaneous  use LUE  25%       Hearing: WFL   Sensation:  Diminished sensation light touch/protective sensation L UE  Tone: Flexor pattern (except finger extension) LUE during functional tasks  Edema: none    Comments: Upon arrival patient supine in bed. At end of session, patient returned to bed with call light and phone within reach. Bed alarm activated  Overall patient demonstrated  decreased independence and safety during completion of ADL/functional transfer/mobility tasks. Pt would benefit from continued skilled OT to increase safety and independence with completion of ADL/IADL tasks for functional independence and quality of life. Treatment: OT treatment provided this date includes:  ?  Instruction/training on safety and adapted techniques for completion of ADLs: - Facilitation of L LUE use during ADL tasks. Increased gross grasp release with repetition. Cues to increase use L UE as assist during ADL tasks. ?   Instruction/training on safe functional mobility/transfer techniques: Positioning L LUE/L LE required during functional transfers/weightbearing tasks completed to increase attention/positioning LUE/LE · History: Expanded  review of medical records and additional review of physical, cognitive, or psychosocial history related to current functional performance  · Exam: 5+ performance deficits  · Assistance/Modification: Mod/Max assistance or modifications required to perform tasks. May have comorbidities that affect occupational performance. Time In: 9a  Time Out: 930a  Total Treatment Time:     Min Units   OT Eval Low 58752       OT Eval Medium 50688   1   OT Eval High [id number]       OT Re-Eval [id number]       Therapeutic Ex [phone]       Therapeutic Activities 51799       ADL/Self Care 45409       Orthotic Management 90091       Neuro Re-Ed 93683  10  1   Non-Billable Time          Evaluation Time includes thorough review of current medical information, gathering information on past medical history/social history and prior level of function, completion of standardized testing/informal observation of tasks, assessment of data and education on plan of care and goals.             Micheline Vieyra OTR/L; [id number]

## 2020-12-21 PROBLEM — I63.9 ACUTE CVA (CEREBROVASCULAR ACCIDENT) (HCC): Status: ACTIVE | Noted: 2020-12-21

## 2020-12-21 LAB — SARS-COV-2, NAAT: NOT DETECTED

## 2020-12-21 PROCEDURE — 97530 THERAPEUTIC ACTIVITIES: CPT

## 2020-12-21 PROCEDURE — 2060000000 HC ICU INTERMEDIATE R&B

## 2020-12-21 PROCEDURE — 99232 SBSQ HOSP IP/OBS MODERATE 35: CPT | Performed by: NURSE PRACTITIONER

## 2020-12-21 PROCEDURE — U0002 COVID-19 LAB TEST NON-CDC: HCPCS

## 2020-12-21 PROCEDURE — 6370000000 HC RX 637 (ALT 250 FOR IP): Performed by: NURSE PRACTITIONER

## 2020-12-21 PROCEDURE — 6370000000 HC RX 637 (ALT 250 FOR IP): Performed by: PSYCHIATRY & NEUROLOGY

## 2020-12-21 PROCEDURE — 2580000003 HC RX 258: Performed by: NURSE PRACTITIONER

## 2020-12-21 PROCEDURE — 99221 1ST HOSP IP/OBS SF/LOW 40: CPT | Performed by: PHYSICAL MEDICINE & REHABILITATION

## 2020-12-21 RX ADMIN — TIZANIDINE 4 MG: 4 TABLET ORAL at 04:06

## 2020-12-21 RX ADMIN — SODIUM CHLORIDE, PRESERVATIVE FREE 10 ML: 5 INJECTION INTRAVENOUS at 08:17

## 2020-12-21 RX ADMIN — TIZANIDINE 4 MG: 4 TABLET ORAL at 12:03

## 2020-12-21 RX ADMIN — GABAPENTIN 300 MG: 300 CAPSULE ORAL at 12:03

## 2020-12-21 RX ADMIN — GABAPENTIN 300 MG: 300 CAPSULE ORAL at 04:06

## 2020-12-21 RX ADMIN — ATORVASTATIN CALCIUM 40 MG: 40 TABLET, FILM COATED ORAL at 21:12

## 2020-12-21 RX ADMIN — SODIUM CHLORIDE, PRESERVATIVE FREE 10 ML: 5 INJECTION INTRAVENOUS at 21:12

## 2020-12-21 RX ADMIN — TIZANIDINE 4 MG: 4 TABLET ORAL at 21:11

## 2020-12-21 RX ADMIN — CLOPIDOGREL 75 MG: 75 TABLET, FILM COATED ORAL at 08:16

## 2020-12-21 RX ADMIN — GABAPENTIN 300 MG: 300 CAPSULE ORAL at 21:11

## 2020-12-21 RX ADMIN — ASPIRIN 81 MG: 81 TABLET, COATED ORAL at 08:16

## 2020-12-21 RX ADMIN — CETIRIZINE HYDROCHLORIDE 10 MG: 10 TABLET, FILM COATED ORAL at 21:11

## 2020-12-21 RX ADMIN — LEVOTHYROXINE SODIUM 50 MCG: 0.05 TABLET ORAL at 04:06

## 2020-12-21 RX ADMIN — ASPIRIN 81 MG: 81 TABLET, COATED ORAL at 21:11

## 2020-12-21 ASSESSMENT — PAIN SCALES - GENERAL
PAINLEVEL_OUTOF10: 0

## 2020-12-21 NOTE — CARE COORDINATION
Spoke with Rajesh at [facility], they will accept and start a precert. Await auth. Dry Covid given to charge RN.

## 2020-12-21 NOTE — PROGRESS NOTES
Adarsh Huntley is a 72 y.o. right handed female     PMH of arthritis, anxiety, cancer, current everyday smoker    Presented to ED with left sided weakness. She was not a tPA candidate due to > 4 hours. CTH showed hypodensity in right internal capsule---subacute lacunar infarct. US carotids with no significant stenosis or occlusions. She is lying in bed with continued left sided weakness but feels well. Discussed her US carotids and follow up with stroke clinic if she is going home. No chest pain or palpitations  No vertigo, lightheadedness or loss of consciousness  No incontinence of bowels or bladder  No itching or bruising appreciated  + numbness and tingling left side   + focal left arm/leg weakness    ROS otherwise negative      Objective:     BP (!) 150/75   Pulse 65   Temp 98.1 °F (36.7 °C)   Resp 18   Ht 5' 6\" (1.676 m)   Wt 114 lb 14.4 oz (52.1 kg)   SpO2 97%   BMI 18.55 kg/m²     General appearance: alert, appears stated age, cooperative and no distress  Head: normocephalic, without obvious abnormality, atraumatic  Eyes: conjunctivae/corneas clear  Neck: symmetrical, trachea midline   Lungs: respirations non labored  Heart: SR on monitor  Extremities:  normal, atraumatic, no cyanosis or edema  Pulses: 2+ and symmetric  Skin: color, texture, turgor normal---no rashes or lesions      Mental Status: Alert and oriented to self, time and place. Follows commands.     Appropriate attention/concentration  Intact fundus of knowledge      Speech: no dysarthria  Language: no aphasia     Cranial Nerves:  I: smell NA   II: visual acuity  NA   II: visual fields Full to confrontation   II: pupils RAJESH   III,VII: ptosis None   III,IV,VI: extraocular muscles  Full ROM   V: mastication Normal   V: facial light touch sensation  Normal   V,VII: corneal reflex     VII: facial muscle function - upper  Normal   VII: facial muscle function - lower Normal   VIII: hearing Normal IX: soft palate elevation  Normal   IX,X: gag reflex    XI: trapezius strength  5/5 right, 4/5 left   XI: sternocleidomastoid strength 5/5 right, 4/5 left   XI: neck extension strength  5/5 right, 4/5 left    XII: tongue strength  Normal     Motor:  5/5 throughout except left side 4/5 strength  Normal bulk and tone  + drift left leg   No abnormal movements    Sensory:  LT normal    Coordination:   FN, FFM and FELIX normal on the right and ataxic on the left  HS normal on the right and ataxic on the left     No Cody's    No other pathological reflexes  Laboratory/Radiology:     CBC with Differential:    Lab Results   Component Value Date    WBC 5.9 12/20/2020    RBC 4.54 12/20/2020    HGB 14.1 12/20/2020    HCT 42.5 12/20/2020     12/20/2020    MCV 93.6 12/20/2020    MCH 31.1 12/20/2020    MCHC 33.2 12/20/2020    RDW 13.4 12/20/2020    SEGSPCT 85 12/22/2012    LYMPHOPCT 15.6 12/19/2020    MONOPCT 4.8 12/19/2020    BASOPCT 0.4 12/19/2020    MONOSABS 0.37 12/19/2020    LYMPHSABS 1.20 12/19/2020    EOSABS 0.03 12/19/2020    BASOSABS 0.03 12/19/2020     CMP:    Lab Results   Component Value Date     12/20/2020    K 3.7 12/20/2020     12/20/2020    CO2 25 12/20/2020    BUN 17 12/20/2020    CREATININE 0.9 12/20/2020    GFRAA >60 12/20/2020    LABGLOM >60 12/20/2020    GLUCOSE 77 12/20/2020    GLUCOSE 93 02/20/2012    PROT 7.0 12/19/2020    LABALBU 4.3 12/19/2020    LABALBU 4.7 02/20/2012    CALCIUM 9.0 12/20/2020    BILITOT 0.5 12/19/2020    ALKPHOS 66 12/19/2020    AST 24 12/19/2020    ALT 12 12/19/2020     Hepatic Function Panel:    Lab Results   Component Value Date    ALKPHOS 66 12/19/2020    ALT 12 12/19/2020    AST 24 12/19/2020    PROT 7.0 12/19/2020    BILITOT 0.5 12/19/2020    LABALBU 4.3 12/19/2020    LABALBU 4.7 02/20/2012     HgBA1c:    Lab Results   Component Value Date    LABA1C 5.4 12/20/2020     FLP:    Lab Results   Component Value Date    TRIG 139 12/20/2020    HDL 62 12/20/2020 LDLCALC 103 2020    LABVLDL 28 2020     CTH: small hyposdensity right internal capsule    MRI brain: 1. Acute ischemic lacunar infarct in the right thalamus. 2. No intracranial hemorrhage or mass effect. 3. Mild-to-moderate chronic white matter microvascular ischemic changes. US carotids: Atherosclerotic disease. No hemodynamically significant stenosis is  identified  Estimated stenosis by NASCET criteria in the proximal right carotid  artery is between 0% and 49%. Estimated stenosis by NASCET criteria in the proximal left carotid  artery is between 0% and 49%.     All labs and imaging studies reviewed independently today    Assessment:     Right thalamus lacunar infarct likely due to   --- current everyday smoker  ---  and A1c 5.4  --- stroke risk factors include: HLD, current smoker    Plan:     DAPT  --- Plavix for 21 days and continue aspirin thereafter  Statin  Stroke educations  PT/OT  Follow up with OP Neurology or stroke clinic  Neurology to sign off       KAMI Hartmann, CNP  10:45 AM  2020

## 2020-12-21 NOTE — PROGRESS NOTES
Subjective:  Feeling better   No CP or SOB  No fever or chills   No uncontrolled pain  No vomiting or diarrhea   still some weakness    Objective:    BP (!) 147/86   Pulse 71   Temp 97.5 °F (36.4 °C) (Temporal)   Resp 18   Ht 5' 6\" (1.676 m)   Wt 114 lb 14.4 oz (52.1 kg)   SpO2 96%   BMI 18.55 kg/m²     24HR INTAKE/OUTPUT:      Intake/Output Summary (Last 24 hours) at 12/21/2020 1242  Last data filed at 12/21/2020 0900  Gross per 24 hour   Intake 760 ml   Output    Net 760 ml       General appearance: NAD, conversant  Neck: FROM, supple   Lungs: Clear bilaterally no wheezes, no rhonchi, no crackles  CV: RRR, no MRGs; normal carotid upstroke and amplitude without Bruits  Abdomen: Soft, non-tender; no masses or HSM  Extremities: No edema, no cyanosis, no clubbing  Skin: Intact no rash, no lesions, no ulcers    Psych: Alert and oriented normal affect  Neuro: + Left upper extremity drift  Most Recent Labs  Lab Results   Component Value Date    WBC 5.9 12/20/2020    HGB 14.1 12/20/2020    HCT 42.5 12/20/2020     12/20/2020     12/20/2020    K 3.7 12/20/2020     12/20/2020    CREATININE 0.9 12/20/2020    BUN 17 12/20/2020    CO2 25 12/20/2020    GLUCOSE 77 12/20/2020    ALT 12 12/19/2020    AST 24 12/19/2020    INR 0.9 12/19/2020    TSH 3.538 02/20/2012    LABA1C 5.4 12/20/2020     No results for input(s): MG in the last 72 hours. Lab Results   Component Value Date    CALCIUM 9.0 12/20/2020        US CAROTID ARTERY BILATERAL   Final Result   Atherosclerotic disease. No hemodynamically significant stenosis is   identified   Estimated stenosis by NASCET criteria in the proximal right carotid   artery is between 0% and 49%. Estimated stenosis by NASCET criteria in the proximal left carotid   artery is between 0% and 49%. MRI BRAIN WO CONTRAST   Final Result   1. Acute ischemic lacunar infarct in the right thalamus. 2. No intracranial hemorrhage or mass effect.

## 2020-12-21 NOTE — PROGRESS NOTES
Physical Therapy    Physical Therapy  Name: Daniel Paige  : 1955  MRN: [id number]    Referring Provider:  KAMI Loredo CNP    Date of Service: 2020    Evaluating PT:  Tonja Paulino, PT, DPT [id number]      Room #:  6389/4460-X  Diagnosis:  Stroke-like symptoms  PMHx/PSHx:  Arthritis, anxiety, numbness and tingling of B LEs, CA, headaches  Procedure/Surgery:  None this admission  Precautions:  Falls, severe L UE and L LE coordination impairments, bed alarm  Equipment Needs:  TBD    SUBJECTIVE:    Pt lives with her nephew in a first floor apartment with 4 stairs down to enter and no rail through rear, 1 rail through front entrance. Bed is on first floor and bath is on first floor. Pt ambulated with no AD prior to admission. Pt reports full independence with mobility and ADLs at baseline    OBJECTIVE:   Initial Evaluation  Date: 20 Treatment 20 Short Term/ Long Term   Goals   AM-PAC 6 Clicks  32/73    Was pt agreeable to Eval/treatment? yes yes    Does pt have pain? No c/o pain No c/o    Bed Mobility  Rolling: SBA  Supine to sit: SBA  Sit to supine: Min A  Scooting: SBA Rolling SBA  Supine to sit SBA  Sit to supine Debbie  Scooting SBA Rolling: Mod Independent   Supine to sit: Mod Independent   Sit to supine: Mod Independent   Scooting: Mod Independent    Transfers Sit to stand: Mod A  Stand to sit: Mod A  Stand pivot: NT Sit to stand ModA  Stand to sit ModA  Stand pivot N/T Sit to stand: Supervision  Stand to sit: Supervision  Stand pivot: Supervision with AAD   Ambulation    2 side steps with no AD and Max A 5 side steps x2 reps without A.D ModA 50 feet with AAD with Supervision   Stair negotiation: ascended and descended  NT N/T TBD   ROM BUE:  WNL  BLE:  WNL     Strength BUE:  WNL  R LE:  4+/5  L LE: 4/5     Balance Sitting EOB:  CGA  Dynamic Standing:   Max A Sitting EOB CGA  Standing modA Sitting EOB:  Independent  Dynamic Standing:  Supervision with AAD Pt is A & O x 4  Sensation: decreased sensation to L LE- absent light touch distal to knee, diminished proximal to knee to lateral hip  Edema:  Unremarkable  Coordination: R UE and R LE intact with finger to nose and tracing square on floor with R LE. Substantially impaired accuracy and decreased speed with both finger to nose and tracing square with L UE and LE respectively. Therapeutic Exercises:    LAQS B 2x10, hip flexion B 2x10, ankle pumps 30x, sit <> stand x3 reps. Patient education  Pt educated on role of therapy, objective findings, safety with transfers and mobility, coordination training tasks     Patient response to education:   Pt verbalized understanding Pt demonstrated skill Pt requires further education in this area   yes yes yes     ASSESSMENT:    Comments:  RN clears pt for therapy this AM. Pt resting semi-supine upon arrival, agreeable to PT tx. Pt demonstrates impaired L UE coordination. Pt required cues to encourage slow controlled movement of B extremities. Pt required CGA for sitting EOB due to uncoordinated movements. Pt stood Moda. Pt sidestepped toward [facility] and toward foot of bed x2 reps ModA. Pt able to advance L LE. Cues again to slow down. Pt assisted back to bed and repositioned. Bed alarm applied. Treatment:  Patient practiced and was instructed in the following treatment:    ? Bed mobility: cues for B UE placement and sequencing, manual assist to guide L LE to bed surface with sit>supine. ? Transfer training: cues for B UE placement,  blocking of L foot/knee for improved alignment to facilitate appropriate positioning for WB, manual assist for lift/lower  Gait training: cues for anterior weight shift and upright posture.     PLAN OF CARE:    Current Treatment Recommendations   [x] Strengthening     [x] ROM   [x] Balance Training   [x] Endurance Training   [x] Transfer Training   [x] Gait Training   [x] Stair Training   [x] Positioning   [x] Safety and Education Training [x] Patient/Caregiver Education   [] HEP  [] Other       PT care will be provided in accordance with the objectives noted above. Whenever appropriate, clear delegation orders will be provided for nursing staff. Exercises and functional mobility practice will be used as well as appropriate assistive devices or modalities to obtain goals. Patient and family education will also be administered as needed. Frequency of treatments: 2-5x/week x 7-10 days.     Time in 9:50  Time out 10:15    Total Treatment Time  25 minutes         CPT codes:  [] Low Complexity PT evaluation 32611  [] Moderate Complexity PT evaluation 51864  [] High Complexity PT evaluation 59470  [] PT Re-evaluation 74953  [] Gait training 33935 0 minutes  [] Manual therapy 61266 0 minutes  [x] Therapeutic activities 22153 25 minutes  [] Therapeutic exercises 61754 0 minutes  [] Neuromuscular reeducation 54774 0 minutes     Roger Newton [id number]

## 2020-12-21 NOTE — PROGRESS NOTES
Spoke with patient at bedside. She is agreeable to ARU. Will initiate precert today. Maeve Polanco informed.

## 2020-12-21 NOTE — CONSULTS
PM&R Consult Note  Patient: Louis German  Age/sex: 72 y.o. female  Medical Record #: [id number]    Referring physician: Sharyle Furl, MD  Consulting physician: Dr. Ifeoma Zavala DO  Primary care provider: Holly Joseph DO    Procedures performed on/related to this admission:  None    Chief complaint:   Impairments and acitivities limitations in ADLs, mobility, functional communication, and cognition secondary to CVA. HPI:   Louis German is a 72 y.o. female with past medical history significant for arthritis, anxiety, numbness/tingling of both legs, cancer, headaches; who presented to [facility] on 2020 with roughly 2 days of left-sided weakness. The patient was not a candidate for TPA and/or thrombectomy. The patient never experienced similar symptoms in the past.  Her symptoms were constant. On imaging, she was found to have an acute ischemic lacunar infarct of the right thalamus. The patient was evaluated by neurology whom recommended DAPT x21 days followed by ASA 81 mg lifelong; high intensity statin; secondary stroke prophylaxis. PM&R is consulted for ARU appropriateness determination. Hospital course was complicated by ataxia on left, mild hyperglycemia. Present status: Currently has no complaints. Afebrile. Pain: None  PO intake: Adequate  BM: None since admission  Micturition: Voiding volitionally  DVT prophylaxis with SCDs. Weight bearing status: As tolerated    Past Medical History:   Diagnosis Date    Anxiety     Arthritis     Cancer ([address]     Headache     3-6 per month; 2/10 on the pain scale    Numbness and tingling of both legs        Past Surgical History:   Procedure Laterality Date   [address], [facility], [facility]     SECTION      x 3    TONSILLECTOMY         History reviewed. No pertinent family history.     Allergies   Allergen Reactions    Aspirin      CAN TAKE ENTERIC COATED ONLY  Garlic Oil Itching     Shaking. Scheduled Meds:      clopidogrel, 75 mg, Daily      sodium chloride flush, 10 mL, 2 times per day      gabapentin, 300 mg, Q8H      cetirizine, 10 mg, Nightly      levothyroxine, 50 mcg, Daily      tiZANidine, 4 mg, Q8H      atorvastatin, 40 mg, Nightly      aspirin, 81 mg, BID        PRN Meds      sodium chloride flush, 10 mL, PRN      promethazine, 12.5 mg, Q6H PRN    Or      ondansetron, 4 mg, Q6H PRN      polyethylene glycol, 17 g, Daily PRN      acetaminophen, 650 mg, Q6H PRN    Or      acetaminophen, 650 mg, Q6H PRN        Social History:  Tobacco/EtOh/Illicits: Yes/No/No  Working History: on disability  Social supports: Lives with nephew, 24 hour assistance may not be available  Home situation: Lives in apartment, with about 4 stairs to enter, and 0 stairs to B/B    Functional History:  Home DME: none   Premorbid ADL's: independent   Premorbid Mobility: good   Present: significant decrease in mobility and function. Physical Therapy (12/21/20): Initial Evaluation  Date: 12/20/20 Treatment 12/21/20 Short Term/ Long Term   Goals   AM-PAC 6 Clicks 98/48 97/27     Was pt agreeable to Eval/treatment? yes yes     Does pt have pain? No c/o pain No c/o     Bed Mobility  Rolling: SBA  Supine to sit: SBA  Sit to supine: Min A  Scooting: SBA Rolling SBA  Supine to sit SBA  Sit to supine Debbie  Scooting SBA Rolling: Mod Independent   Supine to sit: Mod Independent   Sit to supine: Mod Independent   Scooting: Mod Independent    Transfers Sit to stand:  Mod A  Stand to sit: Mod A  Stand pivot: NT Sit to stand ModA  Stand to sit ModA  Stand pivot N/T Sit to stand: Supervision  Stand to sit: Supervision  Stand pivot: Supervision with AAD   Ambulation    2 side steps with no AD and Max A 5 side steps x2 reps without A.D ModA 50 feet with AAD with Supervision   Stair negotiation: ascended and descended  NT N/T TBD   ROM BUE:  WNL  BLE:  WNL       Strength BUE:  WNL R LE:  4+/5  L LE: 4/5       Balance Sitting EOB:  CGA  Dynamic Standing: Max A Sitting EOB CGA  Standing modA Sitting EOB:  Independent  Dynamic Standing:  Supervision with AAD     Occupational Therapy (12/20/20): Initial Eval Status  Date: 12/20/20 Treatment Status  Date: STGs = LTGs  Time frame: 10  days   Feeding Set-up    Independent   Grooming Minimal Assist; seated; Poor function L UE as assist   Supervision    UB Dressing Moderate Assist    Minimal Assist    LB Dressing Maximal Assist ; Debbie R sock; otherwise MaxA   Moderate Assist    Bathing Maximal Assist   Minimal Assist    Toileting Moderate Assist    Minimal Assist    Bed Mobility  Supine to sit: Contact Guard Assist   Sit to supine: Minimal Assist    Supine to sit: Supervision   Sit to supine: Supervision    Functional Transfers Moderate/Maximal Assist for sit to stand from bed; Required Max assist to position and support L LE (flutuating tone L LE /inversion @ ankle/ w/ effort)   Minimal Assist    Functional Mobility Unable to complete functional mobility this date. Side step to [address] MaxA    Moderate Assist    Balance Sitting:     Static:  CGA lean to Left    Dynamic:Debbie during LE ADL tasks  Standing: MaxA       Activity Tolerance Fair   Good for participation in ADLs   Visual/  Perceptual Glasses: yes;  No acute visual changes            Review Of Systems:   Consitutional: No Fever, chills  Skin: No rash, ulcers, or other lesions  HEENT: No HA, blurry vision  Respiratory: No Cough, SOB  Cardiovascular: No CP  Gastrointestinal: No nausea, vomiting, diarrhea, constipation, abdominal discomfort; + continent   Genitourinary: No Dysuria, frequency, urgency; + continent   Musculoskeletal:+ weakness; rest as per HPI  Neurologic: No numbness or tingling; rest as per HPI  Psychiatric: No Depression, No anxiety    Physical Examination:  Vitals:   Vitals:    12/21/20 0400 12/21/20 0453 12/21/20 0741 12/21/20 1100   BP: 135/75  (!) 150/75 (!) 147/86 Pulse: 67  65 71   Resp: 16  18 18   Temp: 97.1 °F (36.2 °C)  98.1 °F (36.7 °C) 97.5 °F (36.4 °C)   TempSrc: Temporal   Temporal   SpO2: 94%  97% 96%   Weight:  114 lb 14.4 oz (52.1 kg)     Height:           GEN: NAD, conversational   HEENT: NC/AT, PERRLA, EOMI  RESP: CTAB, no R/R/W   CV: +S1 S2, RR   ABD: soft, NT, ND, normal BS   : no black   EXT: Abnormal aROM, No clubbing/cyanosis, 2+ pulses   PSYCH: Normal mood and affect    Neuro Exam:  Level of alertness: alert  Affect: appropriate  Perceives auditory stimuli: Yes  Perceives visual stimuli: Yes  Phonation: ? Mild dysarthria  Orientation: Intact    Cranial Nerves:  I: olfactory not tested  II: Full to confrontation  III, IV, VI: extra occular muscles intact  Pupils (II, III): ERRL  V: Facial Sensation/Jaw clench: intact  VII: Facial Motor: intact  VIII. Hearing: intact  XI/X: Palate: intact  XI: Shoulder shrug/SCM: intact  XII: Tongue: intact    Neglect testing: No evidence of tactile or visual neglect    Cerebellar:  FELIX's: Dysdiadochokinesia on the left  F - N: Dysmetria on the left    Sensory:    LT: intact    Motor:   Tone was normal    Motor Findings  Strength: Right  Strength: Left    Deltoid  5/5  4/5    Biceps  5/5  4/5    Triceps  5/5  4/5    Wrist Extensors  5/5  4/5    FDP 5/5 4/5   Finger abductors 5/5 4/5   Iliospoas  5/5  4/5    Quadriceps  5/5  4/5    Tibialis anterior  5/5  4/5    EHL 5/5 4/5   Gastroc soleus  5/5  4/5        DTRs:  Reflex Right Left   Biceps 2+ 2+   Triceps 2+ 2+   Brachioradialis 2+ 2+   Patella 2+ 2+   Achilles  2+ 2+   Clonus Negative Negative   Sveta Negative Negative     Balance/Gait:  Gait: NT    Laboratory:    Lab Results   Component Value Date    WBC 5.9 12/20/2020    HGB 14.1 12/20/2020    HCT 42.5 12/20/2020    MCV 93.6 12/20/2020     12/20/2020     Lab Results   Component Value Date     12/20/2020    K 3.7 12/20/2020     12/20/2020    CO2 25 12/20/2020    BUN 17 12/20/2020 2. Transfer when patient is medically cleared by all teams and ARU bed is available. We will continue to follow for assistance. Thank you for the consult. Please feel free to call with questions. CMS IRF Preadmission Screen Required Elements   Prior level of functioning: independent   Expected level of improvement: Modified independent with functional mobility and self care, intermittent daily supervision for cognition, communicate   Expected time frame to achieve improvement: 2-4 weeks   Evaluation of risk for clinical complications: moderate - falls, neurologic, bleeding, thromboembolic   Conditions causing the need for rehabilitation: CVA  Treatments needed: PT, OT, SLP, Rehab RN   Expected frequency and duration of treatment in the IRF: 3 hours/day, 5 days/week x 2-4 weeks  Anticipated discharge destination: home   Anticipated post-discharge treatments: outpatient PT, OT, Speech Therapy   Other relevant/special information: n/a    Ashok Ramires DO  Board Certified Physical Medicine and Rehabilitation     Please note that the above documentation was prepared using voice recognition software. Every attempt was made to ensure accuracy but there may be spelling, grammatical, and contextual errors.

## 2020-12-21 NOTE — PROGRESS NOTES
Patient previously employed:  [] Yes [] Part Time [] Full Time [x] No [] Retired  Occupation/Profession: disabled  Prior living arrangements: [] Home  [] Assisted living  [] SNF [] Other  Lived with:  [] Alone  [] Spouse  [x] Family  [] Other  Lived with: nephew  Contact phone: none available  Home:  1 story home  3 entry steps  Rails: 0     Bedroom: [x] 1st floor  [] 2nd floor    Bathroom:  [x] 1st floor  [] 2nd floor    Prior Functional Level: Independent for: ADLs, IADLS, drove  Assistance for:   Dependent for:   Dominant hand: [x] Right  [] Left    Previous Home Equipment:  [] Cane [] Grab bars [] Orthotic / prosthetic   [] Shower chair [] Tub bench  [] 3-in-1 Commode [] Long handle sponge   [] Oxygen [] Sock aide  [] Wheelchair  [] motorized wc/scooter  [] Wheelchair cushion   [] Crutches [] Long handle shoehorn  [] Reachers [] Toilet seat elevator [] Rollator  [] Walker(wheeled)   [] Walker(standard) [] Mechanical lift    [x] None of the above     Has patient had 2 or more falls in the past year or any fall with injury in the past year? [] yes   [x] no   []unknown    Has patient had major surgery during past 100 days prior to admission?    [] yes   [x] no Type/ Date:     Surgical History:  Past Surgical History:   Procedure Laterality Date   [address] [place], [place], [address] [facility]      x 3    TONSILLECTOMY         Past Medical:  Past Medical History:   Diagnosis Date    Anxiety     Arthritis     Cancer [address] [address]     Headache     3-6 per month; 2/10 on the pain scale    Numbness and tingling of both legs        Current Co-morbidities:  [] Alzheimer's   [] Dysphasia     [] Parkinsonism  [] Amputation   [] GERD  [] Peripheral artery disease   [] Anemia      [] Encephalopathy  [] Peripheral vascular disease  [x] Anxiety   [] Gangrene   [] Pneumonia  [] Aphasia   [] Gout    [] Polyneuropathy  [] Asthma   [] Heart Failure (diastolic) [] Post-polio syndrome [] Atrial fibrillation  [] Heart Failure (left-sided) [] Pseudomonas enteritis   [] Blind    [] Heart Failure (right-sided) [] Pulmonary embolism  [] Cellulitis     [] Heart Failure (systolic) [] Renal dialysis  [] Clostridium difficile  [x] Hemiparesis   [] Renal failure  [] Congestive heart failure [x] Hypertension   [] Rheumatoid arthritis  [] COPD   [] Hypotension   [] Seizure disorder   [] Coronary Artery Disease [] Hypothyroidism  [] Septicemia   [] Deaf   [] Hyperlipidemia   [] Sleep apnea  [] Depression   [] Morbid obesity  [] Spinal cord injury  [] Diabetes   [] MRSA   [x] Stroke  [] Diabetic nephropathy  [] Myocardial infarction  [] Tracheostomy  [] Diabetic neuropathy  [x] Osteoarthritis  [] Traumatic brain injury   [] Diabetic retinopathy  [] Osteoporosis   [] Urinary tract infection  [] DVT    [] Pancytopenia  [] Vocal cord paralysis  []  Spinal stenosis   []  kidney disease [] VRE  [] Post op    []    []        Medical/Functional Conditions requiring inpatient rehabilitation: Patient has  functional deficits in ADLS, mobility, speech, swallow and cognition, impaired balance, and needs ongoing medical management     Risk for Medical/Clinical Complications: Falls, injury, pain, skin breakdown, abnormal vitals, abnormal labs, DVT, PE, pneumonia, decreased mobility, neuro changes     CLINICAL DATA:     Height : 5'6     Weight:  115 lbs   BMI: 18.64       Date: 12/22/20 Date: 12/23/20 Date:    temperature 97.7 98.6    pulse 63 66    respirations 16 16    Blood pressure 147/85 147/84    Pulse oximeter 96% room air 96% room air       ALLERGIES: Aspirin and Garlic oil    DIET : DIET CARDIAC;    Current Lab and Diagnostic Tests:   No results found for this or any previous visit (from the past 24 hour(s)). Ct Head Wo Contrast  Result Date: 12/19/2020  No acute intracranial abnormality.     Mri Brain Wo Contrast  Result Date: 12/20/2020 1. Acute ischemic lacunar infarct in the right thalamus. 2. No intracranial hemorrhage or mass effect. 3. Mild-to-moderate chronic white matter microvascular ischemic changes. Us Carotid Artery Bilateral  Result Date: 12/20/2020  Atherosclerotic disease. No hemodynamically significant stenosis is identified Estimated stenosis by NASCET criteria in the proximal right carotid artery is between 0% and 49%. Estimated stenosis by NASCET criteria in the proximal left carotid artery is between 0% and 49%.       Additional labs or diagnostic studies needed before admission to rehabilitation unit:  none    Medications:   clopidogrel  75 mg Oral Daily    sodium chloride flush  10 mL Intravenous 2 times per day    gabapentin  300 mg Oral Q8H    cetirizine  10 mg Oral Nightly    levothyroxine  50 mcg Oral Daily    tiZANidine  4 mg Oral Q8H    atorvastatin  40 mg Oral Nightly    aspirin  81 mg Oral BID       sodium chloride flush, promethazine **OR** ondansetron, polyethylene glycol, acetaminophen **OR** acetaminophen    SPECIAL PRECAUTIONS: [x] No current precautions  [] Cardiac  [] Renal [] Sternal [] Respiratory      [] Neurological           [] Hip  [] Spinal [] Seizure  [] Aspiration  [] Isolation precautions:    [] Contact   [] Respiratory   [] Protective     [] Droplet    [x] Weight Bearing precautions:         [] Non Weight Bearing :         [] Toe Touch Weight Bearing :        [] Partial Weight Bearing :         [x] Weight Bearing as Tolerated :         [x] Fall Risk:   [] Recent history of falls [x] Falls risk level (Garcia Scale): high      [] Bed Alarm    [] Do not leave alone in the bathroom    [] Chair Alarm   [] Cognitive impairment      [] One to One supervision  [] Sitter / Tele sitter   [] Safety enclosure bed  [] Decreased balance     SPECIAL REHABILITATION NEEDS:   [x] IV Therapy: [] PRN Adapter  [] Midline  [] PICC      [] Central Line    [] TPN [] Oxygen: [] Trach [] Bi-PAP [] CPAP  [] Nasal cannula  [] Liters:      [] Wound Care:   [] Pressure ulcers(stage and location) -    [] Wound vac   [] Wound or incision care    [] Pain Management (level of pain, meds):      [] Incontinence Bladder [] Ricks  Insertion date:    []Hemodialysis and  Frequency:   [] Incontinence Bowel    [x] Last bowel movement : none charted    Substance use history: [x] Yes  [] No   [x] Tobacco  [x] Alcohol  [] Other     [] Ethnic  [] Cultural  [] Spiritual  [] Language [] Needs  [] Other than English  [] Hearing Impaired  [] Visually Impaired  [] Speaking Impaired  [] Blind  [] Special equipment:  [] Devices/Splints  [] Type   [] Brace   [] Type  [] Bariatric bed  [] Extra wide commode  [] Extra wide wheelchair [] Extra wide walker  [] Be walker  [] Be wheelchair  [] Transfer lift    [] Other equipment     FUNCTIONAL STATUS PT / Janice Mclaughlinr / Nevaeh Macario:  FIM / EVAL Discipline Initial: 12/20/20 Follow Up: 12/22 Current:    Eating OT Set up Set up    Grooming OT Minimum assistance Minimum assistance    Bathing OT Max Assist Moderate Assist    Dressing Upper Extremity OT Moderate Assist Moderate Assist    Dressing Lower Extremity OT Max Assist Moderate Assist    Toileting OT Moderate Assist Minimum assistance    Toilet Transfers OT Max Assist Moderate Assist    Tub/Shower Transfers OT nt nt    Homemaking OT nt nt    Bed Mobility PT Minimum assistance Stand by Assist    Bed/Wheelchair Transfers PT Moderate Assist Moderate Assist    Locomotion Walk / Wheelchair  Device:  Distance: PT 2 side steps no AD Max Assist   10 ft x2 [employer] Moderate Assist      Endurance PT  Fair+    Expression SP  nt    Social Interaction SP  nt    Problem Solving SP  fair    Memory SP  good    Comprehension SP  nt    Swallowing SP  nt    Bowel Management NSG  None charted    Bladder Management NSG  continent      Comments on Functional Status: Able to tolerate 3 hours of therapy a day.  Left hemiparesis [x] Able to participate a minimum of 3 hours per day of therapy intervention    Required treatments/services: [x] Rehabilitation nursing [] Dietitian / nurtition                 [x] Case management  [] Respiratory Therapy      [x] Social work   [] Other     Required Therapy:  Therapy Hours per Day Days per Week Therapeutic Interventions Required   [x] Physical Therapy 1 5-7 Gait, transfers, Safety, strength, education, endurance   [x] Occupational Therapy 1 5-7 ADLs, IADLs,Safety, strength, education, endurance   [x] Speech Pathology 1 5-7 Speech, cognition, safety, education   [] Prosthetics / Orthotics       []         Anticipated Discharge Plan:   Anticipated DME Needs:  [x] Home     [] Commode   [] Alone    [] Wheelchair   [x] Supervised    [] Si Ripa   [] Assist    [] Oxygen        [] Hospital Bed  [] Assisted Living    [] Ramp        [x] To Be Determined    Anticipated Home Health Services:  Anticipated Outpatient Services:  [] PT       [] PT  [] OT      [] OT  [] Speech     [] Speech  [] Nursing     [] Dialysis  [] Aide      [x] To Be Determined  [x] To Be Determined    Anticipated support group:  [] Amputation  [] Multiple Sclerosis  [x] Stroke  [] Brain Injury  [] Spinal cord injury  [] Other     Barriers to discharge: Impaired self care, impaired mobility    Discharge Support: [] Patient lives alone and does not have a caregiver available     [x] Patient has a caregiver available     [x] Discharge plan has been verified with patient's caregiver      [x] Caregiver is in agreement with the discharge plan     Expected functional status for safe discharge: modified independent    Patient/support person goals: go home    Expected length of stay: 2-3 weeks    Discussed expected length of stay and agreeable to IRF plan: [x] Yes   [] No    Impairment Group Category: 1.1    Etiological Diagnosis: CVA    Primary Rehabilitation Diagnosis: CVA    Electronically signed by Jez Tucker RN on 12/23/2020 at 10:33 AM Prescreen completed __________________________________ (signature of prescreener)    Date:   12/23/20 Time:  56    JUSTIFICATION FOR ADMISSION TO ACUTE REHABILITATION:  Patient has suffered decline in functional abilities for gait, transfers, speech, swallowing, cognition,  ADL's and IADL's as well as endurance. Patient has functional deficits requiring intensive therapy across multiple disciplines in order to return home safely. Patient will need physician oversight for respiratory issues, abnormal vital signs, nutritional and hydration status, safety issues, medications and therapy modalities. PT, OT and speech will work on deficits as noted in evaluations. Case management and social work will provide services for DME and management of a safe discharge home        RECOMMEND LEVEL OF CARE  Recommend inpatient rehabilitation: [x] Yes   [] No  If no indicate reason:  [] Functional level too high  [] Unmotivated  [] No insurance carrier approval [] Unlikely to return to community  [] No medical necessity  [] Patient or family chose other facility  [] Too medically complex  [] Inadequate discharge plan  [] Rehabilitation bed unavailable [] Functional level too low  [] patient or family refused ARU    If patient not accepted for IRF admission, recommended level of care:  [] [address]  [] [address]  [] [place]   [] Home Care  [] Other      [] LTAC       Physician Assigned:  [] Dr. Melissa Farfan         [] Dr. Brandon Lennox              [] Dr. Sohail Parrish [] Dr. Stephanie Alexander  [x] Dr. Jericho Sheppard (if not admitted within 48 hours of initial pre-screen)    Medical Update/Changes: Prescreen updated to reflect current data since initiated. Functional Update/Changes: Therapy notes updated on prescreen graph to reflect current status.     Reviewer Signature:_____________________________________    Date:  12/23/20 Time: 1030 PHYSICIAN ADMISSION DETERMINATION AND REVIEW UPDATE:     ____________________________________________________________________  ____________________________________________________________________  ____________________________________________________________________  ____________________________________________________________________  ____________________________________________________________________    Physician Signature:_____________________________________    Print Signature:_________________________________________    Date: 12/23/20    Time:  4693

## 2020-12-22 ENCOUNTER — APPOINTMENT (OUTPATIENT)
Dept: GENERAL RADIOLOGY | Age: 65
DRG: 065 | End: 2020-12-22
Attending: INTERNAL MEDICINE
Payer: COMMERCIAL

## 2020-12-22 PROCEDURE — 97110 THERAPEUTIC EXERCISES: CPT

## 2020-12-22 PROCEDURE — 97530 THERAPEUTIC ACTIVITIES: CPT

## 2020-12-22 PROCEDURE — 6370000000 HC RX 637 (ALT 250 FOR IP): Performed by: NURSE PRACTITIONER

## 2020-12-22 PROCEDURE — 2580000003 HC RX 258: Performed by: NURSE PRACTITIONER

## 2020-12-22 PROCEDURE — 97535 SELF CARE MNGMENT TRAINING: CPT

## 2020-12-22 PROCEDURE — 6370000000 HC RX 637 (ALT 250 FOR IP): Performed by: PSYCHIATRY & NEUROLOGY

## 2020-12-22 PROCEDURE — 2060000000 HC ICU INTERMEDIATE R&B

## 2020-12-22 PROCEDURE — 73610 X-RAY EXAM OF ANKLE: CPT

## 2020-12-22 RX ADMIN — ATORVASTATIN CALCIUM 40 MG: 40 TABLET, FILM COATED ORAL at 21:43

## 2020-12-22 RX ADMIN — SODIUM CHLORIDE, PRESERVATIVE FREE 10 ML: 5 INJECTION INTRAVENOUS at 08:24

## 2020-12-22 RX ADMIN — TIZANIDINE 4 MG: 4 TABLET ORAL at 11:30

## 2020-12-22 RX ADMIN — ASPIRIN 81 MG: 81 TABLET, COATED ORAL at 21:43

## 2020-12-22 RX ADMIN — SODIUM CHLORIDE, PRESERVATIVE FREE 10 ML: 5 INJECTION INTRAVENOUS at 21:43

## 2020-12-22 RX ADMIN — GABAPENTIN 300 MG: 300 CAPSULE ORAL at 03:32

## 2020-12-22 RX ADMIN — CLOPIDOGREL 75 MG: 75 TABLET, FILM COATED ORAL at 08:24

## 2020-12-22 RX ADMIN — TIZANIDINE 4 MG: 4 TABLET ORAL at 03:32

## 2020-12-22 RX ADMIN — TIZANIDINE 4 MG: 4 TABLET ORAL at 20:21

## 2020-12-22 RX ADMIN — GABAPENTIN 300 MG: 300 CAPSULE ORAL at 20:21

## 2020-12-22 RX ADMIN — GABAPENTIN 300 MG: 300 CAPSULE ORAL at 11:30

## 2020-12-22 RX ADMIN — CETIRIZINE HYDROCHLORIDE 10 MG: 10 TABLET, FILM COATED ORAL at 21:43

## 2020-12-22 RX ADMIN — LEVOTHYROXINE SODIUM 50 MCG: 0.05 TABLET ORAL at 05:25

## 2020-12-22 RX ADMIN — ASPIRIN 81 MG: 81 TABLET, COATED ORAL at 08:24

## 2020-12-22 ASSESSMENT — PAIN SCALES - GENERAL
PAINLEVEL_OUTOF10: 0
PAINLEVEL_OUTOF10: 0

## 2020-12-22 NOTE — PROGRESS NOTES
Physical Therapy    Physical Therapy  Name: Ashley Bhakta  : 1955  MRN: [id number]    Referring Provider:  KAMI Loredo CNP    Date of Service: 2020    Evaluating PT:  Evette Apley, PT, DPT [id number]      Room #:  4204/3965-S  Diagnosis:  Stroke-like symptoms  PMHx/PSHx:  Arthritis, anxiety, numbness and tingling of B LEs, CA, headaches  Procedure/Surgery:  None this admission  Precautions:  Falls, severe L UE and L LE coordination impairments, bed alarm  Equipment Needs:  TBD    SUBJECTIVE:    Pt lives with her nephew in a first floor apartment with 4 stairs down to enter and no rail through rear, 1 rail through front entrance. Bed is on first floor and bath is on first floor. Pt ambulated with no AD prior to admission. Pt reports full independence with mobility and ADLs at baseline    OBJECTIVE:   Initial Evaluation  Date: 20 Treatment 20 Short Term/ Long Term   Goals   AM-PAC 6 Clicks  98/57    Was pt agreeable to Eval/treatment? yes Yes     Does pt have pain? No c/o pain No c/o pain    Bed Mobility  Rolling: SBA  Supine to sit: SBA  Sit to supine: Min A  Scooting: SBA Rolling SBA  Supine to sit SBA  Sit to supine NT  Scooting SBA Rolling: Mod Independent   Supine to sit: Mod Independent   Sit to supine: Mod Independent   Scooting: Mod Independent    Transfers Sit to stand: Mod A  Stand to sit: Mod A  Stand pivot: NT Sit to stand ModA  Stand to sit ModA  Stand pivot ModA Sit to stand: Supervision  Stand to sit: Supervision  Stand pivot: Supervision with AAD   Ambulation    2 side steps with no AD and Max A 10 feet x 2 with [employer] with MaxA (no shoes)  40 feet with WW with Mod A (with shoes) 50 feet with AAD with Supervision   Stair negotiation: ascended and descended  NT NT TBD   ROM BUE:  WNL  BLE:  WNL     Strength BUE:  WNL  R LE:  4+/5  L LE: 4/5     Balance Sitting EOB:  CGA  Dynamic Standing:   Max A Sitting EOB SBA Time out 1045    Total Treatment Time  40 minutes     CPT codes:  [] Gait training 95817 0 minutes  [] Manual therapy 09291 0 minutes  [x] Therapeutic activities 33104 40 minutes  [] Therapeutic exercises 48682 0 minutes  [] Neuromuscular reeducation 21430 0 minutes     Stacy Peck PT, DPT  License [id number]

## 2020-12-22 NOTE — PLAN OF CARE
Problem: HEMODYNAMIC STATUS  Goal: Patient has stable vital signs and fluid balance  Outcome: Met This Shift     Problem: ACTIVITY INTOLERANCE/IMPAIRED MOBILITY  Goal: Mobility/activity is maintained at optimum level for patient  Outcome: Met This Shift     Problem: COMMUNICATION IMPAIRMENT  Goal: Ability to express needs and understand communication  Outcome: Met This Shift     Problem: Skin Integrity:  Goal: Will show no infection signs and symptoms  Description: Will show no infection signs and symptoms  Outcome: Met This Shift  Goal: Absence of new skin breakdown  Description: Absence of new skin breakdown  Outcome: Not Met This Shift

## 2020-12-22 NOTE — PROGRESS NOTES
OT BEDSIDE TREATMENT NOTE      Date:2020  Patient Name: Sherry Dennis  MRN: [id number]  : 1955  Room: [address] [place], [place] [address]     Per OT Eval:    Referring Provider: KAMI Loredo CNP     OTMRS Modified Haakon Scale (MRS)     Score:   4     Evaluating OT: Elizabeth King OTR/L; [id number]     AM-PAC Daily Activity Raw Score: 15/24     Recommended Adaptive Equipment: continue to assess     Diagnosis: Stroke like symptoms      Pertinent Medical History: Anxiety. Arthritis, Headache, CA, Numbness B LE     Precautions:  Severe Fall Risk, Poor coordination LUE/LLE w/ tonal changes, Bed alarm, L ankle instability (significant improvement with shoes on, otherwise ankle rolls)     Home Living: Pt lives w/ her nephew in a (1) floor Apt, 3-4 steps to enter. Bathroom setup: Tub/shower combo; higher toilet seat   Equipment owned: none     Prior Level of Function: Independent with ADLs , Independent with IADLs; ambulated Independent without assistive device.    Driving: yes  Occupation: retired     Pain Level: no c/o pain  Cognition: A&O: 4/4; Follows 1-2 step directions, pleasant & cooperative, impulsive, mod cues to stay focused, can be distracted, highly motivated & eager for therapy               Memory:  good              Sequencing: good              Problem solving:  Fair              Judgement/safety:  Fair                Functional Assessment:    Initial Eval Status  Date: 20 Treatment Status  Date:  20 STGs = LTGs  Time frame: 10  days   Feeding Set-up   Set up  Difficulty due to decreased coordination L hand Independent   Grooming Minimal Assist; seated; Poor function L UE as assist  Min-SBA  Using mostly R UE due to decreased coordination L hand, completed seated at EOB, brushing hair & washing hands  Supervision    UB Dressing Moderate Assist   Min- doff/jonatan gown  Mod A jonatan 2nd gown as a robe   strategies to ease task due to decreased coordination L UE Minimal Assist Education:  Pt was educated through out treatment regarding proper technique & safety with bed mobility, functional transfers, mobility, walker safety & ADL compensatory strategies to ease tasks to improve safety, prevent falls and allow pt to return home safely. Educated & completed L UE AROM exercises focusing on controlled movements to improve coordination to ease self care tasks. Fair- FMC/dexterity with pt able to complete finger opposition but with decreased accuracy. Overall Good understanding & return. Comments: Upon arrival pt was in bed & agreeable for therapy. At end of session pt was seated in chair, chair alarm set & nsg informed, all lines and tubes intact, call light within reach. Encouraged pt to stay up in chair until lunch if tolerable. · Pt has made Good progress towards set goals. · Continue with current plan of care    Treatment Time In: 10:05            Treatment Time Out: 10:45             Treatment Charges: Mins Units   Ther Ex  88173 10 1   Manual Therapy Lori Willett [id number] [id number] 10 1   ADL/Home Mgt 32689 20 1   Neuro Re-ed 18104     Group Therapy      Orthotic manage/training  [id number]     Non-Billable Time     Total Timed Treatment 40 3       Chantel BARNES  [address] [place] [address] Drive, [address] [place] [address]

## 2020-12-22 NOTE — PROGRESS NOTES
Called Dr Jenni Nicole and left message concerning patient's left ankle redness and swelling. Awaiting return call or new orders.

## 2020-12-22 NOTE — PLAN OF CARE
Problem: HEMODYNAMIC STATUS  Goal: Patient has stable vital signs and fluid balance  12/22/2020 0031 by Jane Chew RN  Outcome: Met This Shift  12/21/2020 2135 by Zahra Quiles RN  Outcome: Met This Shift     Problem: ACTIVITY INTOLERANCE/IMPAIRED MOBILITY  Goal: Mobility/activity is maintained at optimum level for patient  12/22/2020 0031 by Jane Chew RN  Outcome: Met This Shift  12/21/2020 2135 by Zahra Quiles RN  Outcome: Met This Shift     Problem: COMMUNICATION IMPAIRMENT  Goal: Ability to express needs and understand communication  12/22/2020 0031 by Jane Chew RN  Outcome: Met This Shift  12/21/2020 2135 by Zahra Quiles RN  Outcome: Met This Shift

## 2020-12-22 NOTE — PROGRESS NOTES
Subjective:  Feeling better notes some difficulty with right ankle when trying to walk  No CP or SOB  No fever or chills   No uncontrolled pain  No vomiting or diarrhea   still some weakness    Objective:    /73   Pulse 90   Temp 97.7 °F (36.5 °C) (Temporal)   Resp 16   Ht 5' 6\" (1.676 m)   Wt 115 lb 8 oz (52.4 kg)   SpO2 96%   BMI 18.64 kg/m²     24HR INTAKE/OUTPUT:      Intake/Output Summary (Last 24 hours) at 12/22/2020 1710  Last data filed at 12/22/2020 1523  Gross per 24 hour   Intake 1050 ml   Output    Net 1050 ml       General appearance: NAD, conversant  Neck: FROM, supple   Lungs: Clear bilaterally no wheezes, no rhonchi, no crackles  CV: RRR, no MRGs; normal carotid upstroke and amplitude without Bruits  Abdomen: Soft, non-tender; no masses or HSM  Extremities: Left ankle pronated with some tenderness and edema no edema, no cyanosis, no clubbing  Skin: Intact no rash, no lesions, no ulcers    Psych: Alert and oriented normal affect  Neuro: + Left upper extremity drift  Most Recent Labs  Lab Results   Component Value Date    WBC 5.9 12/20/2020    HGB 14.1 12/20/2020    HCT 42.5 12/20/2020     12/20/2020     12/20/2020    K 3.7 12/20/2020     12/20/2020    CREATININE 0.9 12/20/2020    BUN 17 12/20/2020    CO2 25 12/20/2020    GLUCOSE 77 12/20/2020    ALT 12 12/19/2020    AST 24 12/19/2020    INR 0.9 12/19/2020    TSH 3.538 02/20/2012    LABA1C 5.4 12/20/2020     No results for input(s): MG in the last 72 hours. Lab Results   Component Value Date    CALCIUM 9.0 12/20/2020        US CAROTID ARTERY BILATERAL   Final Result   Atherosclerotic disease. No hemodynamically significant stenosis is   identified   Estimated stenosis by NASCET criteria in the proximal right carotid   artery is between 0% and 49%. Estimated stenosis by NASCET criteria in the proximal left carotid   artery is between 0% and 49%.       MRI BRAIN WO CONTRAST   Final Result 1. Acute ischemic lacunar infarct in the right thalamus. 2. No intracranial hemorrhage or mass effect. 3. Mild-to-moderate chronic white matter microvascular ischemic changes. XR ANKLE LEFT (MIN 3 VIEWS)    (Results Pending)       Assessment    Principal Problem:    Right thalamic stroke [facility])  Active Problems:    History of cerebral infarction    Tobacco abuse    Peripheral neuropathy    Hypothyroidism    Muscle spasm    Acute CVA (cerebrovascular accident) ([facility] [address]  Resolved Problems:    * No resolved hospital problems.  *      Plan:  60-year-old female history of anxiety and hypothyroidism admitted with acute CVA     Admit Tele  DAPT  Statin  MRI acute right thalamic lacunar infarct  carotid US negative   Neurology consult appreciated  Medications for other co morbidities cont as appropriate w dosage adjustments as necessary  Check left ankle 3 view x-ray  PMR Consult appreciated   PT/OT  D/C planning ARU pending bed         Electronically signed by Lucinda Pal MD on 12/22/2020 at 5:10 PM

## 2020-12-23 ENCOUNTER — HOSPITAL ENCOUNTER (INPATIENT)
Age: 65
LOS: 13 days | Discharge: HOME | DRG: 057 | End: 2021-01-05
Attending: PHYSICAL MEDICINE & REHABILITATION | Admitting: PHYSICAL MEDICINE & REHABILITATION
Payer: COMMERCIAL

## 2020-12-23 VITALS
OXYGEN SATURATION: 96 % | SYSTOLIC BLOOD PRESSURE: 121 MMHG | RESPIRATION RATE: 16 BRPM | DIASTOLIC BLOOD PRESSURE: 92 MMHG | TEMPERATURE: 97.3 F | HEIGHT: 66 IN | WEIGHT: 115.5 LBS | BODY MASS INDEX: 18.56 KG/M2 | HEART RATE: 70 BPM

## 2020-12-23 DIAGNOSIS — I63.9 ACUTE CVA (CEREBROVASCULAR ACCIDENT) (HCC): Primary | ICD-10-CM

## 2020-12-23 PROCEDURE — 1280000000 HC REHAB R&B

## 2020-12-23 PROCEDURE — 6370000000 HC RX 637 (ALT 250 FOR IP): Performed by: PHYSICAL MEDICINE & REHABILITATION

## 2020-12-23 PROCEDURE — 6370000000 HC RX 637 (ALT 250 FOR IP): Performed by: NURSE PRACTITIONER

## 2020-12-23 PROCEDURE — 2580000003 HC RX 258: Performed by: NURSE PRACTITIONER

## 2020-12-23 PROCEDURE — 99222 1ST HOSP IP/OBS MODERATE 55: CPT | Performed by: PHYSICAL MEDICINE & REHABILITATION

## 2020-12-23 PROCEDURE — 6360000002 HC RX W HCPCS: Performed by: PHYSICAL MEDICINE & REHABILITATION

## 2020-12-23 PROCEDURE — 6370000000 HC RX 637 (ALT 250 FOR IP): Performed by: INTERNAL MEDICINE

## 2020-12-23 PROCEDURE — 99221 1ST HOSP IP/OBS SF/LOW 40: CPT | Performed by: ORTHOPAEDIC SURGERY

## 2020-12-23 PROCEDURE — 6370000000 HC RX 637 (ALT 250 FOR IP): Performed by: PSYCHIATRY & NEUROLOGY

## 2020-12-23 RX ORDER — SODIUM CHLORIDE 0.9 % (FLUSH) 0.9 %
10 SYRINGE (ML) INJECTION PRN
Status: DISCONTINUED | OUTPATIENT
Start: 2020-12-23 | End: 2020-12-23

## 2020-12-23 RX ORDER — ACETAMINOPHEN 325 MG/1
650 TABLET ORAL EVERY 6 HOURS PRN
Status: CANCELLED | OUTPATIENT
Start: 2020-12-23

## 2020-12-23 RX ORDER — TIZANIDINE 4 MG/1
4 TABLET ORAL EVERY 8 HOURS
Status: CANCELLED | OUTPATIENT
Start: 2020-12-23

## 2020-12-23 RX ORDER — OXYCODONE HYDROCHLORIDE 5 MG/1
5 TABLET ORAL EVERY 6 HOURS PRN
Status: DISCONTINUED | OUTPATIENT
Start: 2020-12-23 | End: 2021-01-05 | Stop reason: HOSPADM

## 2020-12-23 RX ORDER — M-VIT,TX,IRON,MINS/CALC/FOLIC 27MG-0.4MG
1 TABLET ORAL DAILY
Status: DISCONTINUED | OUTPATIENT
Start: 2020-12-24 | End: 2021-01-05 | Stop reason: HOSPADM

## 2020-12-23 RX ORDER — ONDANSETRON 2 MG/ML
4 INJECTION INTRAMUSCULAR; INTRAVENOUS EVERY 6 HOURS PRN
Status: CANCELLED | OUTPATIENT
Start: 2020-12-23

## 2020-12-23 RX ORDER — ACETAMINOPHEN 650 MG/1
650 SUPPOSITORY RECTAL EVERY 6 HOURS PRN
Status: CANCELLED | OUTPATIENT
Start: 2020-12-23

## 2020-12-23 RX ORDER — ONDANSETRON 4 MG/1
4 TABLET, ORALLY DISINTEGRATING ORAL EVERY 8 HOURS PRN
Status: DISCONTINUED | OUTPATIENT
Start: 2020-12-23 | End: 2021-01-05 | Stop reason: HOSPADM

## 2020-12-23 RX ORDER — ATORVASTATIN CALCIUM 40 MG/1
40 TABLET, FILM COATED ORAL NIGHTLY
Status: CANCELLED | OUTPATIENT
Start: 2020-12-23

## 2020-12-23 RX ORDER — SODIUM CHLORIDE 0.9 % (FLUSH) 0.9 %
10 SYRINGE (ML) INJECTION PRN
Status: CANCELLED | OUTPATIENT
Start: 2020-12-23

## 2020-12-23 RX ORDER — CLOPIDOGREL BISULFATE 75 MG/1
75 TABLET ORAL DAILY
Status: CANCELLED | OUTPATIENT
Start: 2020-12-24 | End: 2021-01-11

## 2020-12-23 RX ORDER — HYDRALAZINE HYDROCHLORIDE 25 MG/1
25 TABLET, FILM COATED ORAL EVERY 8 HOURS PRN
Status: DISCONTINUED | OUTPATIENT
Start: 2020-12-23 | End: 2021-01-05 | Stop reason: HOSPADM

## 2020-12-23 RX ORDER — SODIUM CHLORIDE 0.9 % (FLUSH) 0.9 %
10 SYRINGE (ML) INJECTION EVERY 12 HOURS SCHEDULED
Status: DISCONTINUED | OUTPATIENT
Start: 2020-12-23 | End: 2020-12-23

## 2020-12-23 RX ORDER — CETIRIZINE HYDROCHLORIDE 10 MG/1
10 TABLET ORAL NIGHTLY
Status: DISCONTINUED | OUTPATIENT
Start: 2020-12-23 | End: 2021-01-05 | Stop reason: HOSPADM

## 2020-12-23 RX ORDER — ASPIRIN 81 MG/1
81 TABLET ORAL DAILY
Status: CANCELLED | OUTPATIENT
Start: 2020-12-24

## 2020-12-23 RX ORDER — ERGOCALCIFEROL 1.25 MG/1
50000 CAPSULE ORAL WEEKLY
Status: DISCONTINUED | OUTPATIENT
Start: 2020-12-24 | End: 2021-01-05 | Stop reason: HOSPADM

## 2020-12-23 RX ORDER — SODIUM CHLORIDE 0.9 % (FLUSH) 0.9 %
10 SYRINGE (ML) INJECTION EVERY 12 HOURS SCHEDULED
Status: CANCELLED | OUTPATIENT
Start: 2020-12-23

## 2020-12-23 RX ORDER — SENNA PLUS 8.6 MG/1
2 TABLET ORAL NIGHTLY
Status: DISCONTINUED | OUTPATIENT
Start: 2020-12-23 | End: 2021-01-05 | Stop reason: HOSPADM

## 2020-12-23 RX ORDER — CLOPIDOGREL BISULFATE 75 MG/1
75 TABLET ORAL DAILY
Status: DISCONTINUED | OUTPATIENT
Start: 2020-12-24 | End: 2021-01-05 | Stop reason: HOSPADM

## 2020-12-23 RX ORDER — GABAPENTIN 300 MG/1
300 CAPSULE ORAL EVERY 8 HOURS
Status: CANCELLED | OUTPATIENT
Start: 2020-12-23

## 2020-12-23 RX ORDER — PROMETHAZINE HYDROCHLORIDE 25 MG/1
12.5 TABLET ORAL EVERY 6 HOURS PRN
Status: CANCELLED | OUTPATIENT
Start: 2020-12-23

## 2020-12-23 RX ORDER — DOCUSATE SODIUM 100 MG/1
100 CAPSULE, LIQUID FILLED ORAL 2 TIMES DAILY
Status: DISCONTINUED | OUTPATIENT
Start: 2020-12-23 | End: 2021-01-05 | Stop reason: HOSPADM

## 2020-12-23 RX ORDER — TIZANIDINE 4 MG/1
4 TABLET ORAL EVERY 8 HOURS
Status: DISCONTINUED | OUTPATIENT
Start: 2020-12-23 | End: 2021-01-05 | Stop reason: HOSPADM

## 2020-12-23 RX ORDER — POLYETHYLENE GLYCOL 3350 17 G/17G
17 POWDER, FOR SOLUTION ORAL DAILY PRN
Status: DISCONTINUED | OUTPATIENT
Start: 2020-12-23 | End: 2020-12-23

## 2020-12-23 RX ORDER — PROMETHAZINE HYDROCHLORIDE 25 MG/1
12.5 TABLET ORAL EVERY 6 HOURS PRN
Status: DISCONTINUED | OUTPATIENT
Start: 2020-12-23 | End: 2020-12-23

## 2020-12-23 RX ORDER — ACETAMINOPHEN 325 MG/1
650 TABLET ORAL EVERY 6 HOURS PRN
Status: DISCONTINUED | OUTPATIENT
Start: 2020-12-23 | End: 2020-12-23

## 2020-12-23 RX ORDER — ONDANSETRON 2 MG/ML
4 INJECTION INTRAMUSCULAR; INTRAVENOUS EVERY 6 HOURS PRN
Status: DISCONTINUED | OUTPATIENT
Start: 2020-12-23 | End: 2020-12-23

## 2020-12-23 RX ORDER — GABAPENTIN 300 MG/1
300 CAPSULE ORAL EVERY 8 HOURS
Status: DISCONTINUED | OUTPATIENT
Start: 2020-12-23 | End: 2021-01-05 | Stop reason: HOSPADM

## 2020-12-23 RX ORDER — LEVOTHYROXINE SODIUM 0.05 MG/1
50 TABLET ORAL DAILY
Status: DISCONTINUED | OUTPATIENT
Start: 2020-12-24 | End: 2021-01-05 | Stop reason: HOSPADM

## 2020-12-23 RX ORDER — ASPIRIN 81 MG/1
81 TABLET ORAL DAILY
Status: DISCONTINUED | OUTPATIENT
Start: 2020-12-24 | End: 2021-01-05 | Stop reason: HOSPADM

## 2020-12-23 RX ORDER — CALCIUM CARBONATE 200(500)MG
500 TABLET,CHEWABLE ORAL 3 TIMES DAILY PRN
Status: DISCONTINUED | OUTPATIENT
Start: 2020-12-23 | End: 2021-01-05 | Stop reason: HOSPADM

## 2020-12-23 RX ORDER — LEVOTHYROXINE SODIUM 0.05 MG/1
50 TABLET ORAL DAILY
Status: CANCELLED | OUTPATIENT
Start: 2020-12-24

## 2020-12-23 RX ORDER — OXYCODONE HYDROCHLORIDE 5 MG/1
2.5 TABLET ORAL EVERY 6 HOURS PRN
Status: DISCONTINUED | OUTPATIENT
Start: 2020-12-23 | End: 2021-01-05 | Stop reason: HOSPADM

## 2020-12-23 RX ORDER — CETIRIZINE HYDROCHLORIDE 10 MG/1
10 TABLET ORAL NIGHTLY
Status: CANCELLED | OUTPATIENT
Start: 2020-12-23

## 2020-12-23 RX ORDER — LANOLIN ALCOHOL/MO/W.PET/CERES
3 CREAM (GRAM) TOPICAL NIGHTLY
Status: DISCONTINUED | OUTPATIENT
Start: 2020-12-23 | End: 2020-12-29

## 2020-12-23 RX ORDER — ACETAMINOPHEN 325 MG/1
650 TABLET ORAL EVERY 6 HOURS PRN
Status: DISCONTINUED | OUTPATIENT
Start: 2020-12-23 | End: 2021-01-05 | Stop reason: HOSPADM

## 2020-12-23 RX ORDER — POLYETHYLENE GLYCOL 3350 17 G/17G
17 POWDER, FOR SOLUTION ORAL DAILY PRN
Status: CANCELLED | OUTPATIENT
Start: 2020-12-23

## 2020-12-23 RX ORDER — ACETAMINOPHEN 650 MG/1
650 SUPPOSITORY RECTAL EVERY 6 HOURS PRN
Status: DISCONTINUED | OUTPATIENT
Start: 2020-12-23 | End: 2020-12-23

## 2020-12-23 RX ORDER — ATORVASTATIN CALCIUM 40 MG/1
40 TABLET, FILM COATED ORAL NIGHTLY
Status: DISCONTINUED | OUTPATIENT
Start: 2020-12-23 | End: 2021-01-05 | Stop reason: HOSPADM

## 2020-12-23 RX ADMIN — GABAPENTIN 300 MG: 300 CAPSULE ORAL at 11:28

## 2020-12-23 RX ADMIN — SENNOSIDES 17.2 MG: 8.6 TABLET, FILM COATED ORAL at 20:57

## 2020-12-23 RX ADMIN — LEVOTHYROXINE SODIUM 50 MCG: 0.05 TABLET ORAL at 06:40

## 2020-12-23 RX ADMIN — GABAPENTIN 300 MG: 300 CAPSULE ORAL at 04:50

## 2020-12-23 RX ADMIN — ASPIRIN 81 MG: 81 TABLET, COATED ORAL at 09:25

## 2020-12-23 RX ADMIN — DOCUSATE SODIUM 100 MG: 100 CAPSULE, LIQUID FILLED ORAL at 20:58

## 2020-12-23 RX ADMIN — SODIUM CHLORIDE, PRESERVATIVE FREE 10 ML: 5 INJECTION INTRAVENOUS at 09:25

## 2020-12-23 RX ADMIN — TIZANIDINE 4 MG: 4 TABLET ORAL at 11:28

## 2020-12-23 RX ADMIN — ATORVASTATIN CALCIUM 40 MG: 40 TABLET, FILM COATED ORAL at 20:57

## 2020-12-23 RX ADMIN — GABAPENTIN 300 MG: 300 CAPSULE ORAL at 20:00

## 2020-12-23 RX ADMIN — TIZANIDINE 4 MG: 4 TABLET ORAL at 20:00

## 2020-12-23 RX ADMIN — Medication 3 MG: at 22:10

## 2020-12-23 RX ADMIN — CETIRIZINE HYDROCHLORIDE 10 MG: 10 TABLET, FILM COATED ORAL at 20:57

## 2020-12-23 RX ADMIN — TIZANIDINE 4 MG: 4 TABLET ORAL at 00:00

## 2020-12-23 RX ADMIN — CLOPIDOGREL 75 MG: 75 TABLET, FILM COATED ORAL at 09:25

## 2020-12-23 ASSESSMENT — PAIN SCALES - GENERAL
PAINLEVEL_OUTOF10: 0
PAINLEVEL_OUTOF10: 0

## 2020-12-23 NOTE — DISCHARGE INSTR - COC
Infection Onset Added Last Indicated Last Indicated By Review Planned Expiration Resolved Resolved By    None active    Resolved    COVID-19 Rule Out 12/21/20 12/21/20 12/21/20 COVID-19 (Ordered)   12/21/20 Rule-Out Test Resulted          Nurse Assessment:  Last Vital Signs: BP (!) 121/92   Pulse 70   Temp 97.3 °F (36.3 °C) (Temporal)   Resp 16   Ht 5' 6\" (1.676 m)   Wt 115 lb 8 oz (52.4 kg)   SpO2 96%   BMI 18.64 kg/m²     Last documented pain score (0-10 scale): Pain Level: 0  Last Weight:   Wt Readings from Last 1 Encounters:   12/22/20 115 lb 8 oz (52.4 kg)     Mental Status:  oriented and alert    IV Access:  - Peripheral IV - site  R Antecubital, insertion date: ***    Nursing Mobility/ADLs:  Walking   Assisted  Transfer  Assisted  Bathing  Assisted  Dressing  Assisted  Toileting  Assisted  Feeding  Independent  Med Admin  Assisted  Med Delivery   whole    Wound Care Documentation and Therapy:        Elimination:  Continence:   · Bowel: Yes  · Bladder: Yes  Urinary Catheter: None   Colostomy/Ileostomy/Ileal Conduit: No       Date of Last BM:     Intake/Output Summary (Last 24 hours) at 12/23/2020 1439  Last data filed at 12/23/2020 0900  Gross per 24 hour   Intake 660 ml   Output    Net 660 ml     I/O last 3 completed shifts: In: 12 [P.O.:960]  Out: -     Safety Concerns: At Risk for Falls    Impairments/Disabilities:      left sided weakness    Nutrition Therapy:  Current Nutrition Therapy:   - Oral Diet:  Cardiac    Routes of Feeding: Oral  Liquids: No Restrictions  Daily Fluid Restriction: no  Last Modified Barium Swallow with Video (Video Swallowing Test): not done    Treatments at the Time of Hospital Discharge:   Respiratory Treatments:   Oxygen Therapy:  is not on home oxygen therapy.   Ventilator:    - No ventilator support    Rehab Therapies: Physical Therapy and Occupational Therapy  Weight Bearing Status/Restrictions: No weight bearing restirctions

## 2020-12-23 NOTE — CONSULTS
Department of Orthopedic Surgery  Resident Consult Note          Reason for Consult: Left ankle laxity    HISTORY OF PRESENT ILLNESS:       Patient is a 72 y.o. female who presents with left ankle laxity and sensory deficit after a CVA which occurred last week. Patient states he did have a fall on Friday but she states it was not traumatic in nature. As a result of her CVA, patient has had left-sided weakness and sensory deficit in the upper and lower extremities. Orthopedics was consulted to evaluate her left ankle prior to arrival on acute rehab unit. Patient does not recall any prior injury to the left ankle or foot. Patient was ambulatory without assistive device prior to her recent CVA.     Past Medical History:        Diagnosis Date    Anxiety     Arthritis     Cancer ([facility] [address]     Headache     3-6 per month; 2/10 on the pain scale    Numbness and tingling of both legs      Past Surgical History:        Procedure Laterality Date   [address] [place] [address], [address], [facility]     SECTION      x 3    TONSILLECTOMY       Current Medications:   Current Facility-Administered Medications: [START ON 2020] aspirin EC tablet 81 mg, 81 mg, Oral, Daily  atorvastatin (LIPITOR) tablet 40 mg, 40 mg, Oral, Nightly  cetirizine (ZYRTEC) tablet 10 mg, 10 mg, Oral, Nightly  [START ON 2020] clopidogrel (PLAVIX) tablet 75 mg, 75 mg, Oral, Daily  gabapentin (NEURONTIN) capsule 300 mg, 300 mg, Oral, Q8H  [START ON 2020] levothyroxine (SYNTHROID) tablet 50 mcg, 50 mcg, Oral, Daily  tiZANidine (ZANAFLEX) tablet 4 mg, 4 mg, Oral, Q8H  enoxaparin (LOVENOX) injection 40 mg, 40 mg, Subcutaneous, Daily  [START ON 2020] therapeutic multivitamin-minerals 1 tablet, 1 tablet, Oral, Daily  [START ON 2020] vitamin D (ERGOCALCIFEROL) capsule 50,000 Units, 50,000 Units, Oral, Weekly  acetaminophen (TYLENOL) tablet 650 mg, 650 mg, Oral, Q6H PRN **OR** [DISCONTINUED] acetaminophen (TYLENOL) suppository 650 mg, 650 mg, Rectal, Q6H PRN  oxyCODONE (ROXICODONE) immediate release tablet 2.5 mg, 2.5 mg, Oral, Q6H PRN **OR** oxyCODONE (ROXICODONE) immediate release tablet 5 mg, 5 mg, Oral, Q6H PRN  ondansetron (ZOFRAN-ODT) disintegrating tablet 4 mg, 4 mg, Oral, Q8H PRN  calcium carbonate (TUMS) chewable tablet 500 mg, 500 mg, Oral, TID PRN  magnesium hydroxide (MILK OF MAGNESIA) 400 MG/5ML suspension 30 mL, 30 mL, Oral, Daily PRN  melatonin tablet 3 mg, 3 mg, Oral, Nightly  hydrALAZINE (APRESOLINE) tablet 25 mg, 25 mg, Oral, Q8H PRN  docusate sodium (COLACE) capsule 100 mg, 100 mg, Oral, BID  senna (SENOKOT) tablet 17.2 mg, 2 tablet, Oral, Nightly  Allergies:  Aspirin and Garlic oil    Social History:   TOBACCO:   reports that she has been smoking cigarettes. She has been smoking about 0.50 packs per day. She has never used smokeless tobacco.  ETOH:   reports current alcohol use. DRUGS:   reports no history of drug use. ACTIVITIES OF DAILY LIVING:    OCCUPATION:    Family History:   No family history on file.     REVIEW OF SYSTEMS:  CONSTITUTIONAL:  negative for  fevers, chills  EYES:  negative for blurred vision, visual disturbance  HEENT:  negative for  hearing loss, voice change  RESPIRATORY:  negative for  dyspnea, wheezing  CARDIOVASCULAR:  negative for  chest pain, palpitations  GASTROINTESTINAL:  negative for nausea, vomiting  GENITOURINARY:  negative for frequency, urinary incontinence  HEMATOLOGIC/LYMPHATIC:  negative for bleeding and petechiae  MUSCULOSKELETAL:  positive for left ankle laxity   NEUROLOGICAL:  negative for headaches, dizziness  BEHAVIOR/PSYCH:  negative for increased agitation and anxiety    PHYSICAL EXAM:    VITALS:  BP (!) 121/92   Pulse 70   CONSTITUTIONAL:  awake, alert, cooperative, no apparent distress, and appears stated age  MUSCULOSKELETAL:  Left lower Extremity:  The left ankle is posturally in plantarflexion/equinus position  Skin is intact circumferentially-no ecchymosis or edema noted  Compartments soft and compressible  No tenderness to palpation or with movement of the left ankle or foot  There is appreciable laxity within the ligamentous structures of the left ankle  +PF/DF/EHL  +2/4 DP & PT pulses, Brisk Cap refill, Toes warm and perfused  Patient with global sensory deficit in the left ankle and foot. DATA:    CBC:   Lab Results   Component Value Date    WBC 5.9 12/20/2020    RBC 4.54 12/20/2020    HGB 14.1 12/20/2020    HCT 42.5 12/20/2020    MCV 93.6 12/20/2020    MCH 31.1 12/20/2020    MCHC 33.2 12/20/2020    RDW 13.4 12/20/2020     12/20/2020    MPV 10.6 12/20/2020     PT/INR:    Lab Results   Component Value Date    PROTIME 10.8 12/19/2020    PROTIME 11.2 10/24/2011    INR 0.9 12/19/2020       Radiology Review:  X-ray left ankle: No fractures or dislocations noted.   Noticeable talar tilt and plantarflexion at the left ankle/foot due to patient's residual stroke and possible prior ligamentous laxity    IMPRESSION:  · Left ankle ligamentous laxity  · Status post left-sided CVA    PLAN:  · No acute orthopedic intervention at this time  · Recommend PM&R management with appropriate bracing and rehabilitation at discretion of PM&R  · PT/OT as able  · Weightbearing as tolerated left lower extremity  · Patient may follow-up as needed in the future with any orthopedic related concerns  · Please notify with any questions or concerns  · Discussed with Dr. Bayron Rodriguez

## 2020-12-23 NOTE — CARE COORDINATION
Spoke with Rajesh, liaison with ARU at [facility]. Authorization received for the patient to transition to their program today. Discharge/readmit navigator completed. Spoke with bedside nurse Jose Day. Await input from Orthopedics as patient may need a boot for her left ankle. Room assignment received for 5834. Call placed to the patient's son Stacey Arnold to update on above plans. He is in agreement. Phone number tot he patient's new room and the rehab floor provided to the patient's son. Anticipate transition to ARU today.      Palmira Bullock.  P:  [phone]

## 2020-12-23 NOTE — PLAN OF CARE
Problem: HEMODYNAMIC STATUS  Goal: Patient has stable vital signs and fluid balance  Outcome: Met This Shift     Problem: ACTIVITY INTOLERANCE/IMPAIRED MOBILITY  Goal: Mobility/activity is maintained at optimum level for patient  Outcome: Met This Shift     Problem: COMMUNICATION IMPAIRMENT  Goal: Ability to express needs and understand communication  Outcome: Met This Shift     Problem: Skin Integrity:  Goal: Will show no infection signs and symptoms  Description: Will show no infection signs and symptoms  Outcome: Met This Shift     Problem: Skin Integrity:  Goal: Absence of new skin breakdown  Description: Absence of new skin breakdown  Outcome: Met This Shift

## 2020-12-23 NOTE — H&P
PM&R Admission History & Physical Examination  Patient: Mery Santiago  Age/sex: 72 y.o. female  Medical Record #: [id number]    Consulting physician: Dr. Stephanie Kamara DO  Primary care provider: Du Beach DO    Procedures performed on/related to this admission:  None    Chief complaint:   Impairments and acitivities limitations in ADLs, mobility, functional communication, and cognition secondary to CVA. HPI:   Mery Santiago is a 72 y.o. female with past medical history significant for arthritis, anxiety, numbness/tingling of both legs, cancer, headaches; who presented to [facility] on 12/19/2020 with roughly 2 days of left-sided weakness. The patient was not a candidate for TPA and/or thrombectomy. The patient never experienced similar symptoms in the past.  Her symptoms were constant. On imaging, she was found to have an acute ischemic lacunar infarct of the right thalamus. The patient was evaluated by neurology whom recommended DAPT x21 days followed by ASA 81 mg lifelong; high intensity statin; secondary stroke prophylaxis. She was admitted into acute inpatient rehabilitation on 12/23/2020. Hospital course was complicated by ataxia on left, mild hyperglycemia, left ankle pain (ortho consult pending). Present status: Currently has no complaints. Afebrile. Pain: Controlled  PO intake: Adequate  BM: None documented since admission  Micturition: Voiding volitionally  DVT prophylaxis with SCDs. Weight bearing status: As tolerated    Past Medical History:   Diagnosis Date    Anxiety     Arthritis     Cancer ([facility] [address] [facility]     Headache     3-6 per month; 2/10 on the pain scale    Numbness and tingling of both legs        Past Surgical History:   Procedure Laterality Date   [address] [facility]      x 3    TONSILLECTOMY         No family history on file.     Allergies   Allergen Reactions    Aspirin      CAN TAKE ENTERIC COATED ONLY    Garlic Oil Itching     Shaking. Scheduled Meds:      [START ON 12/24/2020] aspirin, 81 mg, Daily      atorvastatin, 40 mg, Nightly      cetirizine, 10 mg, Nightly      [START ON 12/24/2020] clopidogrel, 75 mg, Daily      gabapentin, 300 mg, Q8H      [START ON 12/24/2020] levothyroxine, 50 mcg, Daily      tiZANidine, 4 mg, Q8H      enoxaparin, 40 mg, Daily      [START ON 12/24/2020] therapeutic multivitamin-minerals, 1 tablet, Daily      [START ON 12/24/2020] vitamin D, 50,000 Units, Weekly      melatonin, 3 mg, Nightly      docusate sodium, 100 mg, BID      senna, 2 tablet, Nightly      PRN Meds      acetaminophen, 650 mg, Q6H PRN      oxyCODONE, 2.5 mg, Q6H PRN    Or      oxyCODONE, 5 mg, Q6H PRN      ondansetron, 4 mg, Q8H PRN      calcium carbonate, 500 mg, TID PRN      magnesium hydroxide, 30 mL, Daily PRN      hydrALAZINE, 25 mg, Q8H PRN      Home Medications:  Zanaflex 4 mg 3 times daily  Relafen 750 mg twice daily  Synthroid 50 mcg daily  Xyzal 5 mg nightly  ASA 81 mg twice daily  Gabapentin 3 mg 3 times daily  Lodine 400 mg daily  Multivitamin    Social History:  Tobacco/EtOh/Illicits: Yes/no/no  Working History: on disability  Social supports: Lives with nephew, 24 hour assistance may not be available  Home situation: Lives in apartment, with about 4 stairs to enter, and 0 stairs to B/B     Functional History:  Home DME: none   Premorbid ADL's: independent   Premorbid Mobility: good   Present: significant decrease in mobility and function. Physical Therapy (12/21/20):    Initial Evaluation  Date: 12/20/20 Treatment 12/21/20 Short Term/ Long Term   Goals   AM-PAC 6 Clicks 72/72 40/04     Was pt agreeable to Eval/treatment? yes yes     Does pt have pain?  No c/o pain No c/o     Bed Mobility  Rolling: SBA  Supine to sit: SBA  Sit to supine: Min A  Scooting: SBA Rolling SBA  Supine to sit SBA  Sit to supine Debbie  Scooting SBA Rolling: Mod Independent   Supine to sit: Mod Independent   Sit to supine: Mod Independent   Scooting: Mod Independent    Transfers Sit to stand: Mod A  Stand to sit: Mod A  Stand pivot: NT Sit to stand ModA  Stand to sit ModA  Stand pivot N/T Sit to stand: Supervision  Stand to sit: Supervision  Stand pivot: Supervision with AAD   Ambulation    2 side steps with no AD and Max A 5 side steps x2 reps without A.D ModA 50 feet with AAD with Supervision   Stair negotiation: ascended and descended  NT N/T TBD   ROM BUE:  WNL  BLE:  WNL       Strength BUE:  WNL  R LE:  4+/5  L LE: 4/5       Balance Sitting EOB:  CGA  Dynamic Standing:  Max A Sitting EOB CGA  Standing modA Sitting EOB:  Independent  Dynamic Standing:  Supervision with AAD      Occupational Therapy (12/20/20):    Initial Eval Status  Date: 12/20/20 Treatment Status  Date: STGs = LTGs  Time frame: 10  days   Feeding Set-up    Independent   Grooming Minimal Assist; seated; Poor function L UE as assist   Supervision    UB Dressing Moderate Assist    Minimal Assist    LB Dressing Maximal Assist ; Debbie R sock; otherwise MaxA   Moderate Assist    Bathing Maximal Assist   Minimal Assist    Toileting Moderate Assist    Minimal Assist    Bed Mobility  Supine to sit: Contact Guard Assist   Sit to supine: Minimal Assist    Supine to sit: Supervision   Sit to supine: Supervision    Functional Transfers Moderate/Maximal Assist for sit to stand from bed; Required Max assist to position and support L LE (flutuating tone L LE /inversion @ ankle/ w/ effort)   Minimal Assist    Functional Mobility Unable to complete functional mobility this date. Side step to [facility] MaxA    Moderate Assist    Balance Sitting:     Static:  CGA lean to Left    Dynamic:Debbie during LE ADL tasks  Standing: MaxA       Activity Tolerance Fair   Good for participation in ADLs   Visual/  Perceptual Glasses: yes;  No acute visual changes             Review Of Systems:   Consitutional: No Fever, chills  Skin: No rash, ulcers, or other lesions  HEENT: No HA, blurry vision  Respiratory: No Cough, SOB  Cardiovascular: No CP  Gastrointestinal: No nausea, vomiting, diarrhea, constipation, abdominal discomfort; + continent   Genitourinary: No Dysuria, frequency, urgency; + continent   Musculoskeletal:+ weakness; rest as per HPI  Neurologic: No numbness or tingling; rest as per HPI  Psychiatric: No Depression, No anxiety    Physical Examination:  Vitals: There were no vitals filed for this visit. GEN: NAD, conversational   HEENT: NC/AT, PERRLA, EOMI  RESP: CTAB, no R/R/W   CV: +S1 S2, RR   ABD: soft, NT, ND, normal BS   : no black   EXT: Abnormal aROM, No clubbing/cyanosis, 2+ pulses   PSYCH: Normal mood and affect     Neuro Exam:  Level of alertness: alert  Affect: appropriate  Perceives auditory stimuli: Yes  Perceives visual stimuli: Yes  Phonation: Mild dysarthria  Orientation: Intact     Cranial Nerves:  I: olfactory not tested  II: Full to confrontation  III, IV, VI: extra occular muscles intact  Pupils (II, III): ERRL  V: Facial Sensation/Jaw clench: intact  VII: Facial Motor: intact  VIII. Hearing: intact  XI/X: Palate: intact  XI: Shoulder shrug/SCM: intact  XII: Tongue: intact     Neglect testing: No evidence of tactile or visual neglect     Cerebellar:  FELIX's: Dysdiadochokinesia on the left  F - N: Dysmetria on the left     Sensory:    LT: intact     Motor:   Tone was normal     Motor Findings  Strength: Right  Strength: Left    Deltoid  5/5  4/5    Biceps  5/5  4/5    Triceps  5/5  4/5    Wrist Extensors  5/5  4/5    FDP 5/5 4/5   Finger abductors 5/5 4/5   Iliospoas  5/5  4/5    Quadriceps  5/5  4/5    Tibialis anterior  5/5  4/5    EHL 5/5 4/5   Gastroc soleus  5/5  4/5          DTRs:  Reflex Right Left   Biceps 2+ 2+   Triceps 2+ 2+   Brachioradialis 2+ 2+   Patella 2+ 2+   Achilles  2+ 2+   Clonus Negative Negative   Sveta Negative Negative      Balance/Gait:  Gait: NT    Laboratory:    Lab Results   Component Value Date    WBC 5.9 12/20/2020    HGB 14.1 12/20/2020    HCT 42.5 12/20/2020    MCV 93.6 12/20/2020     12/20/2020     Lab Results   Component Value Date     12/20/2020    K 3.7 12/20/2020     12/20/2020    CO2 25 12/20/2020    BUN 17 12/20/2020    CREATININE 0.9 12/20/2020    GLUCOSE 77 12/20/2020    GLUCOSE 93 02/20/2012    CALCIUM 9.0 12/20/2020      Lab Results   Component Value Date    ALT 12 12/19/2020    AST 24 12/19/2020    ALKPHOS 66 12/19/2020    BILITOT 0.5 12/19/2020     No results found for: VOL, APPEARANCE, COLORU, LABSPEC, LABPH, LEUKBLD, NITRU, GLUCOSEU, KETUA, UROBILINOGEN, KETUA, UROBILINOGEN, BILIRUBINUR, OCBU  Lab Results   Component Value Date    LABA1C 5.4 12/20/2020     No results found for: EAG    Microbiology:   None     Pertinent Imaging (radiologist interpretation unless otherwise stated):  12/20/2020: MRI BRAIN WO CONTRAST -   Impression   1. Acute ischemic lacunar infarct in the right thalamus. 2. No intracranial hemorrhage or mass effect. 3. Mild-to-moderate chronic white matter microvascular ischemic changes.        IMPRESSION:  Mery Santiago is a 72 y.o. right-handed female with PMH significant for arthritis, anxiety, numbness/tingling of both legs, cancer, headaches; who [facility] to [facility] on 12[facility]19/2020 with roughly 2 days of left-sided weakness. The patient was not a candidate for TPA and/or thrombectomy. On imaging, she was found to have an acute ischemic lacunar infarct of the right thalamus. Hospital course was complicated by ataxia on left, mild hyperglycemia. She was admitted in acute inpatient rehabilitation on 12/23/2020. Patient has impairments in:  Demonstrating impaired communication, impaired strength, impaired ROM, impaired balance, impaired coordination, impaired safety awareness, decreased endurance.     Patient has activities limitations in self care, mobility, functional communication and cognition, and is admitted to [place] [address] [facility] for acute comprehensive rehabilitation. I. Rehabilitation Necessity/Diagnosis:   Medical impact on function as a result of impaired functional mobility, ambulation, bed mobility, transfers, self-care/ADL's, strength, endurance, range of motion/flexibility, coordination and impaired gait/balance. Treatment plan will include a comprehensive rehabilitation program with intense therapies for 3 hours/day, 5 days/week. 1. Physical therapy to address bed mobidility, car and mat transfer, progressive ambulation with use of least restrictive assistive device, optimization of gait biomechanics, truncal strengthening, lower extremity strengthening, coordination, range of motion/flexibility, wheelchair and cushion evaluation, durable medical equipment evaluation, patient and family instruction and endurance training. 2. Occupational therapy to address feeding, grooming, upper and lower body dressing and bathing, toileting, tub/toilet/bed transfers, durable medical equipment evaluation, upper extremity strengthening, range of motion/flexibility, dexterity, coordination and patient and family instruction. 3. Rehabilitation nursing 24 hours per day to monitor bowel and bladder function, work on bowel routine, voiding trials/black catheter management as per bladder management protocol, assess falls risk upon admission and periodically thereafter, implement and revise falls prevention strategies, maintain skin integrity through initial and daily pressure sore risk assessment (Vernon scale), implement and revise pressure sore prevention strategies, educate patient and family members regarding medication administration, ADL's, transfers, and mobility and continue therapy carryover with ADL's, transfers, and mobility  4. Social work and case management consults for discharge planning/disposition issues, as well as coordination and communication of patient progress between family and providers.   5. Rehab psychology - as needed for adjustment, coping  6. Recreational therapy for community reintegration activities. II. Medical Management:  # Neuro - s/p acute ischemic lacunar infarct of the right thalamus.  - Not a candidate for tPA or thrombectomy. - Permissive HTN with gentle return to normotensive.   - Secondary stroke prevention - treat HTN, HLD, DM   - Lipitor 40mg daily, ASA 81mg daily, Plavix 75 mg daily x21 days (EOT 1/10/2021)   - Antiplatelet/Anticoagulation therapy: ASA and Plavix (see DVT section below). - Long-term, patient will also need lifestyle modifications as well including appropriate diet and exercise, and refraining from tobacco use. Will educate on lifestyle modifications throughout rehab stay. - Spasticity: none present   - PRECAUTION: FALLS  - Post-discharge, follow with Neuro in stroke clinic and PM&R for stroke rehabilitation. # Ortho - left hemiparesis  - WBAT   - Will monitor for shoulder pain/subluxation - no acute issues  - ?L ankle pain, XR done on 12/23, ortho consult pending. # CVS - recent CVA  - Secondary stroke prevention - treat HTN, HLD, DM       # Pulm - at risk for atelectasis  - Continue good pulm hygiene. - Continue Zyrtec 10 mg nightly for allergies. # GI/bowel/FEN -   - Diet: general cardiac, thin liquids. - Bowel program: Colace 100 BID, senna 2 tabs at lunch, MoM PRN  - GI prophylaxis: none  - Given high prevalence of Vitamin D deficiency in [place]: supplement with ergocalciferol 50K units/week x8 weeks. - MVI daily. # Pain control - Acetaminophen 650 mg q6H PRN for pain, oxycodone 2.5-5mg tabs q4PRN for moderate-severe pain  - Continue home meds gabapentin 300 mg TID, zanaflex 4 mg TID PRN. - Relafen and Lodine on hold for now due to history of stroke. # DVT prophylaxis - SCDs and chemoprophylaxis with Lovenox 40 mg daily. # Skin - at risk for DTI  - maintain skin integrity. - turns q2H.     # Renal/urinary -   - Renal: No evidence of renal failure on last BMP on 12/20/2020.  - Bladder: check PVR's times three. Reports of patient voiding volitionally. # Heme - no acute issues  - Hgb stable at 14.1 on 12/20/2020. # Endocrine - hypothyroidism  - Continue Synthroid 50 mcg daily    # ID - no acute issues    # Psych - Melatonin 3 mg QHS for insomnia  - Can consider rehab psychology if issues with coping/adjustment arise. # Disposition/social - likely discharge home, will discuss in team rounds. # Code status: FULL CODE    III. Estimated length of stay: 2-4 weeks    IV. Goals - Modified independent for mobility and self care    V. Anticipated discharge setting: Home    VI. Prognosis: Good    CMS Required Post-Admission Physician Evaluation Elements  History and Physical, including medical history, functional history and active comorbidities as in above text. Preadmission Screen documentation of Dr. Oswaldo Bravo via Manuel Levine RN. Post -Admission Physician Evaluation comparison:  Agree in entirety    Medication Reconciliation CMS Requirements:  Drug Regimen Review:  Upon admission, did a complete drug regimen review identify potential clinically significant medication issues requiring immediate attention by a physician? No    Medication Intervention:   If yes, was the physician notified by midnight of the next calendar day and were recommended actions in response to the issue completed by midnight of the next calendar day? N/A    Ashok Ramires DO  Board Certified Physical Medicine and Rehabilitation     Please note that the above documentation was prepared using voice recognition software. Every attempt was made to ensure accuracy but there may be spelling, grammatical, and contextual errors.

## 2020-12-23 NOTE — LETTER
Date: 1/4/2021    Dear  Rico Pedroza:    Recently [facility]. We hope that you are continuing to make progress following your stroke. Now that you are back in your home, questions may arise and you may appreciate the opportunity to speak with one of our health professionals or other stroke survivors and their families. The [id number] [facility] Stroke [facility] MEETINGS VIA ZOOM BEGIN AT 4PM AND TYPICALLY CONCLUDE BY 5:30PM.  The meeting is a good opportunity for stroke survivors and their families to mix and mingle with other stroke survivors and family members. January off  February 4  March 4  April 1  May 6  Lara 3  July off  August 5   September 2  October 7  November 4   December 2    We would love to have you attend virtually thru Zoom until further notice. Please call me at [phone] OR email me: [email or web address] in order to confirm your attendance and for assistance setting up Zoom. Sincerely,    Juma Guajardo MSN, APRN, FNP-C     [address]   [address]

## 2020-12-23 NOTE — CONSULTS
I performed a history and physical exam of the patient, reviewed pertinent imaging, and discussed the patient's management with the resident. I reviewed the resident's note and agree with the documented findings and plan of care. In summary, patient has left foot and ankle postural deformities after stroke and hemiparesis. There is some clinical evidence of pre-existing ligamentous laxity, but no clinical or radiographic evidence of bony trauma requiring orthopedic intervention. PM&R management with bracing and rehab at discretion of [place] [address] physicians. Ortho will sign off.

## 2020-12-23 NOTE — PROGRESS NOTES
Approval obtained by payer source. Patient to admit today to ARU bed 5517a. Orders in [facility]. Patient's son Angela Brow informed and will bring in clothing.

## 2020-12-23 NOTE — DISCHARGE SUMMARY
Physician Discharge Summary     Patient ID:  Louis German  [id number]  39 y.o.  1955    Admit date: 12/19/2020    Discharge date and time:  12/23/2020     Admission Diagnoses:    Right thalamic stroke [facility])     Discharge Diagnoses:   Principal Problem:    Right thalamic stroke [facility])  Active Problems:    History of cerebral infarction    Tobacco abuse    Peripheral neuropathy    Hypothyroidism    Muscle spasm    Acute CVA (cerebrovascular accident) ([address]  Resolved Problems:    * No resolved hospital problems. *       Consults: neurology    Procedures: none    Hospital Course:   Patient presented with left-sided weakness and was found to have a stroke. She was outside the window for TPA. MRI confirms a right lacunar infarct of the thalamus. There is no significant obstructive disease on carotid ultrasound. She has been started on dual antiplatelet therapy and statin. She should remain on DAPT for 21 days, and then transition to baby aspirin only. She will be transferred to acute rehab. Discharge Exam:  Vitals:    12/22/20 0735 12/22/20 1515 12/23/20 0030 12/23/20 0740   BP: (!) 147/85 132/73 128/70 (!) 147/84   Pulse: 63 90 65 66   Resp: 16 16 16 16   Temp: 97.7 °F (36.5 °C)  98.1 °F (36.7 °C) 98.6 °F (37 °C)   TempSrc: Temporal  Temporal Temporal   SpO2: 96% 96% 97% 97%   Weight:       Height:            Gen: Pleasant female in NAD  CV: RRR no m/r/g  Lungs: CTAB  Abd: +BS soft NT ND no R/G  Ext: Slight swelling and mild pain with ROM of left ankle. No deformity. No overlying cellulitis, but there are some small abrasions. Neuro: Left hand incoordination and weakness. Face symmetric. Foot strength intact b/l grossly.     Condition:  Stable    Disposition: Acute rehab unit    Patient Instructions:     Current Facility-Administered Medications:     clopidogrel (PLAVIX) tablet 75 mg, 75 mg, Oral, Daily, Rob Liu MD, 75 mg at 12/23/20 2059   sodium chloride flush 0.9 % injection 10 mL, 10 mL, Intravenous, 2 times per day, April Store[place], APRN - CNP, 10 mL at 12/23/20 7380    sodium chloride flush 0.9 % injection 10 mL, 10 mL, Intravenous, PRN, April Nicolette-Ken, APRN - CNP    promethazine (PHENERGAN) tablet 12.5 mg, 12.5 mg, Oral, Q6H PRN **OR** ondansetron (ZOFRAN) injection 4 mg, 4 mg, Intravenous, Q6H PRN, April Storey-Ken, APRN - CNP    polyethylene glycol (GLYCOLAX) packet 17 g, 17 g, Oral, Daily PRN, April Storey-Ken, APRN - CNP    acetaminophen (TYLENOL) tablet 650 mg, 650 mg, Oral, Q6H PRN **OR** acetaminophen (TYLENOL) suppository 650 mg, 650 mg, Rectal, Q6H PRN, April Nicolette-Ken, APRN - CNP    gabapentin (NEURONTIN) capsule 300 mg, 300 mg, Oral, Q8H, April [place], APRN - CNP, 300 mg at 12/23/20 0450    cetirizine (ZYRTEC) tablet 10 mg, 10 mg, Oral, Nightly, April [place], APRN - CNP, 10 mg at 12/22/20 2143    levothyroxine (SYNTHROID) tablet 50 mcg, 50 mcg, Oral, Daily, April Storey-[place], APRN - CNP, 50 mcg at 12/23/20 0625    tiZANidine (ZANAFLEX) tablet 4 mg, 4 mg, Oral, Q8H, April Nicolette-Ken, APRN - CNP, 4 mg at 12/23/20 0000    atorvastatin (LIPITOR) tablet 40 mg, 40 mg, Oral, Nightly, April Harnett-Ken, APRN - CNP, 40 mg at 12/22/20 2143    aspirin EC tablet 81 mg, 81 mg, Oral, BID, April [place], APRN - CNP, 81 mg at 12/23/20 7296     Activity: activity as tolerated  Diet: regular diet    Follow-up with PCP in 1 week.     Note that over 30 minutes was spent in preparing discharge papers, discussing discharge with patient, medication review, etc.    Signed:  Bhupinder Orellana    12/23/2020  10:48 AM

## 2020-12-23 NOTE — LETTER
PORTABLE PATIENT PROFILE  [facility]  [id number]    MEDICAL DIAGNOSIS/CONDITION:   Patient Active Problem List   Diagnosis    Cerebral atherosclerosis    Peripheral neuropathy due to ischemia    History of cerebral infarction    [facility]    Tobacco abuse    Peripheral neuropathy    Hypothyroidism    Muscle spasm    Acute CVA (cerebrovascular accident) ([facility]    Moderate protein-calorie malnutrition ([facility]       INSURANCE INFORMATION:  Payor: Dusty Gamboa /  /  /     ADVANCED DIRECTIVES:   Advance Directives (For Healthcare)  Pre-existing DNR Comfort Care/DNR Arrest/DNI Order: No  Healthcare Directive: No, patient does not have an advance directive for healthcare treatment  Information on Healthcare Directives Requested: No  Patient Requests Assistance: No  Advance Directives: Pt. not interested at this time  [unfilled]     EMERGENCY CONTACT:       RISK FACTORS:   Social History     Tobacco Use    Smoking status: Current Every Day Smoker     Packs/day: 0.50     Types: Cigarettes    Smokeless tobacco: Never Used   Substance Use Topics    Alcohol use: Yes     Comment: rarely       ALLERGIES:  Allergies   Allergen Reactions    Aspirin      CAN TAKE ENTERIC COATED ONLY    Garlic Oil Itching     Shaking. IMMUNIZATIONS:  There is no immunization history for the selected administration types on file for this patient. SWALLOWING:   Difficulty Chewing or Swallowing Food: No    VISION AND HEARING:   Sensory Problems  Visual impairment: Glasses    PHYSICIANS INVOLVED WITH CARE:    Ashok Ramires DO  No ref.  provider found  Yuri Schneider DO  [address] [place] [address], DO

## 2020-12-24 PROCEDURE — 92523 SPEECH SOUND LANG COMPREHEN: CPT | Performed by: SPEECH-LANGUAGE PATHOLOGIST

## 2020-12-24 PROCEDURE — 97166 OT EVAL MOD COMPLEX 45 MIN: CPT

## 2020-12-24 PROCEDURE — 97530 THERAPEUTIC ACTIVITIES: CPT

## 2020-12-24 PROCEDURE — 6370000000 HC RX 637 (ALT 250 FOR IP): Performed by: INTERNAL MEDICINE

## 2020-12-24 PROCEDURE — 97112 NEUROMUSCULAR REEDUCATION: CPT

## 2020-12-24 PROCEDURE — 97535 SELF CARE MNGMENT TRAINING: CPT

## 2020-12-24 PROCEDURE — 97162 PT EVAL MOD COMPLEX 30 MIN: CPT

## 2020-12-24 PROCEDURE — 1280000000 HC REHAB R&B

## 2020-12-24 PROCEDURE — 92610 EVALUATE SWALLOWING FUNCTION: CPT | Performed by: SPEECH-LANGUAGE PATHOLOGIST

## 2020-12-24 PROCEDURE — 97110 THERAPEUTIC EXERCISES: CPT

## 2020-12-24 PROCEDURE — 6370000000 HC RX 637 (ALT 250 FOR IP): Performed by: PHYSICAL MEDICINE & REHABILITATION

## 2020-12-24 RX ADMIN — Medication: at 11:22

## 2020-12-24 RX ADMIN — LEVOTHYROXINE SODIUM 50 MCG: 0.05 TABLET ORAL at 06:30

## 2020-12-24 RX ADMIN — CLOPIDOGREL 75 MG: 75 TABLET, FILM COATED ORAL at 11:11

## 2020-12-24 RX ADMIN — TIZANIDINE 4 MG: 4 TABLET ORAL at 05:09

## 2020-12-24 RX ADMIN — CLOPIDOGREL 75 MG: 75 TABLET, FILM COATED ORAL at 11:22

## 2020-12-24 RX ADMIN — TIZANIDINE 4 MG: 4 TABLET ORAL at 11:10

## 2020-12-24 RX ADMIN — ASPIRIN 81 MG: 81 TABLET, COATED ORAL at 11:21

## 2020-12-24 RX ADMIN — GABAPENTIN 300 MG: 300 CAPSULE ORAL at 11:12

## 2020-12-24 RX ADMIN — GABAPENTIN 300 MG: 300 CAPSULE ORAL at 20:06

## 2020-12-24 RX ADMIN — GABAPENTIN 300 MG: 300 CAPSULE ORAL at 03:14

## 2020-12-24 RX ADMIN — ATORVASTATIN CALCIUM 40 MG: 40 TABLET, FILM COATED ORAL at 20:06

## 2020-12-24 RX ADMIN — DOCUSATE SODIUM 100 MG: 100 CAPSULE, LIQUID FILLED ORAL at 20:06

## 2020-12-24 RX ADMIN — TIZANIDINE 4 MG: 4 TABLET ORAL at 20:06

## 2020-12-24 RX ADMIN — Medication 3 MG: at 22:22

## 2020-12-24 RX ADMIN — Medication: at 11:10

## 2020-12-24 RX ADMIN — CETIRIZINE HYDROCHLORIDE 10 MG: 10 TABLET, FILM COATED ORAL at 20:06

## 2020-12-24 RX ADMIN — DOCUSATE SODIUM 100 MG: 100 CAPSULE, LIQUID FILLED ORAL at 11:12

## 2020-12-24 RX ADMIN — ASPIRIN 81 MG: 81 TABLET, COATED ORAL at 11:11

## 2020-12-24 RX ADMIN — SENNOSIDES 17.2 MG: 8.6 TABLET, FILM COATED ORAL at 20:06

## 2020-12-24 RX ADMIN — DOCUSATE SODIUM 100 MG: 100 CAPSULE, LIQUID FILLED ORAL at 11:24

## 2020-12-24 RX ADMIN — GABAPENTIN 300 MG: 300 CAPSULE ORAL at 11:21

## 2020-12-24 RX ADMIN — TIZANIDINE 4 MG: 4 TABLET ORAL at 11:22

## 2020-12-24 RX ADMIN — ERGOCALCIFEROL 50000 UNITS: 1.25 CAPSULE ORAL at 11:12

## 2020-12-24 ASSESSMENT — PAIN SCALES - GENERAL: PAINLEVEL_OUTOF10: 0

## 2020-12-24 NOTE — PROGRESS NOTES
SPEECH/LANGUAGE PATHOLOGY  SPEECH/LANGUAGE/COGNITIVE EVALUATION      PATIENT NAME:  Beto Uriarte      :  1955         TODAY'S DATE:  2020 ROOM:  [facility]9/7727-B     ADMITTING DIAGNOSIS: Acute CVA (cerebrovascular accident) ([facility] [address] [I63.9]    SPEECH PATHOLOGY DIAGNOSIS:    Within Functional Limits    THERAPY RECOMMENDATIONS:   Speech Pathology intervention is not warranted at this time. FIMS SCORES      Swallowing    Current Status  7--Independent    Short Term Goal 7--Independent    Long Term Goal 7--Independent     Receptive    Current Status  6--Modified Independent    Short Term Goal 6--Modified Independent    Long Term Goal 6--Modified Independent     Expressive    Current Status  6--Modified Independent    Short Term Goal 6--Modified Independent    Long Term Goal 6--Modified Independent     Social Interaction    Current Status  5--Supervision    Short Term Goal 5--Supervision    Long Term Goal 5--Supervision         Problem Solving    Current Status  6--Modified Independent    Short Term Goal 6--Modified Independent    Long Term Goal 6--Modified Independent      Memory    Current Status  6--Modified Independent    Short Term Goal 6--Modified Independent    Long Term Goal 6--Modified Independent         MOTOR SPEECH       Oral Peripheral Examination   Adequate lingual/labial strength     Parameters of Speech Production  Respiration:  Adequate for speech production  Articulation:  Within functional limits  Resonance:  Within functional limits  Quality:   Within functional limits  Pitch: Within functional limits  Intensity: Within functional limits  Fluency:  Intact  Prosody Intact    RECEPTIVE LANGUAGE    Comprehension of Yes/No Questions:    Within functional limits    Process  Simple Verbal Commands:   Within functional limits  Process Intermediate Verbal Commands:   Within functional limits  Process Complex Verbal Commands:     Within functional limits    Comprehension of Conversation:      Within functional limits      EXPRESSIVE LANGUAGE     Serials: Functional    Imitation:  Words   Functional   Sentences Functional    Naming:  (Modality used:  Verbal)  Confrontation Naming  Functional  Functional Description  Functional  Response Naming: Functional    Conversation:      Conversation was within functional limits    COGNITION     Attention/Orientation  Attention: Sustained attention   Orientation:   Person:  oriented    Place:  oriented    Year:  correct    Month:  accurate within 5 days    Day of Week:  correct    Situation:  accurately described    Memory   Long Term Recall: Address:  recalled without cuing  Birthdate:  recalled without cuing  Age: recalled without cuing  Family: recalled without cuing    Immediate Recall: Sock:  recalled accurately     Blue:  recalled accurately     Bed:  recalled accurately    Delayed Recall:   Sock: recalled without cuing     Blue:  recalled without cuing     Bed:  recalled without cuing    Organization/Problem Solving/Reasoning   Sequencing:    Verbal: Functional      Motor:  No significant concerns per OT    Problem solving: Verbal: Functional      Motor:  No significant concerns per OT    Mathematics:  Simple:  Functional     Complex:  Functional      CLINICAL OBSERVATIONS NOTED DURING THE EVALUATION  Within functional limits      EDUCATION    Speech Pathologist (SLP) completed education with the patient and/or family regarding identified cognitive linguistic deficits and subsequent need for speech pathology intervention. Discussed deficit areas to be targeted by formal intervention and established short/long term goals. Reviewed compensatory strategies to improve functional outcome (as appropriate). Encouraged patient and/or family to engage SLP in structured Q&A session relative to identified deficit areas.  Patient and/or family indicated understanding of all information provided via satisfactory verbal response. ? Prognosis for improvements is good  This plan will be re-evaluated and revised in 1 week  if warranted. Patient stated goals: Agreed with above  Treatment goals discussed with Patient   The Patient understand(s) the diagnosis, prognosis and plan of care       The admitting diagnosis and active problem list, as listed below have been reviewed prior to initiation of this evaluation.         ACTIVE PROBLEM LIST:   Patient Active Problem List   Diagnosis    Cerebral atherosclerosis    Peripheral neuropathy due to ischemia    History of cerebral infarction    Right thalamic stroke ([facility] [address]    Tobacco abuse    Peripheral neuropathy    Hypothyroidism    Muscle spasm    Acute CVA (cerebrovascular accident) ([facility] [address]

## 2020-12-24 NOTE — PLAN OF CARE
Problem: Skin Integrity:  Goal: Will show no infection signs and symptoms  Description: Will show no infection signs and symptoms  Outcome: Met This Shift  Goal: Absence of new skin breakdown  Description: Absence of new skin breakdown  Outcome: Met This Shift     Problem: Falls - Risk of:  Goal: Will remain free from falls  Description: Will remain free from falls  Outcome: Met This Shift  Goal: Absence of physical injury  Description: Absence of physical injury  Outcome: Met This Shift     Problem: Neurological  Goal: Maximum potential motor/sensory/cognitive function  Outcome: Met This Shift     Problem: Safety:  Goal: Free from accidental physical injury  Description: Free from accidental physical injury  Outcome: Met This Shift  Goal: Free from intentional harm  Description: Free from intentional harm  Outcome: Met This Shift     Problem: Daily Care:  Goal: Daily care needs are met  Description: Daily care needs are met  Outcome: Met This Shift     Problem: Pain:  Goal: Patient's pain/discomfort is manageable  Description: Patient's pain/discomfort is manageable  Outcome: Met This Shift     Problem: Skin Integrity:  Goal: Skin integrity will stabilize  Description: Skin integrity will stabilize  Outcome: Met This Shift

## 2020-12-24 NOTE — PROGRESS NOTES
Occupational Therapy   Occupational Therapy Initial Assessment  Date: 2020   Patient Name: Alexa Pratt  MRN: [id number]     : 1955  Room: 5517A    Referring Practitioner: Dixon Ramos MD  Diagnosis: CVA  Additional Pertinent Hx: arthritis, anxiety, hx cancer  Restrictions: Fall Risk, LUE/LLE proprioceptive deficits, L ankle instabilty- improves with shoes on,     Date of Service: 2020    Discharge Recommendations:  Home with assist PRN, Outpatient OT     Subjective   Chart Reviewed: Yes  Family / Caregiver Present: No  Subjective: Pt presents supine in bed & was agreeable to OT intervention  Pain Comments: Pt did not c/o pain during OT session     Social/Functional History  Lives With: Family- lives with nephew.   Type of Home: Apartment  Home Layout: One level  Home Access: Stairs to enter with rails  Entrance Stairs - Number of Steps: 3-4 steps 1HR  Bathroom Shower/Tub: Tub/Shower unit  Bathroom Toilet: Handicap height  ADL Assistance: Independent  Homemaking Assistance: Independent  Homemaking Responsibilities: Yes  Meal Prep Responsibility: Primary  Laundry Responsibility: Primary  Cleaning Responsibility: Primary  Ambulation Assistance: Independent  Transfer Assistance: Independent  Active : Yes  Mode of Transportation: Car  Occupation: On disability  Type of occupation:  @ [employer]  IADL Comments: PLOF pt independent in all areas  Additional Comments: Pt stated her son is going to live with her when she goes home     Objective   Vision: Impaired  Vision Exceptions: Wears glasses at all times  Hearing: Within functional limits       Observation/Palpation  Posture: Fair- proprioceptive deficits hinders pt balance    Balance  Sitting Balance: Stand by assistance  Standing Balance: Minimal assistance  Functional Mobility  Functional - Mobility Device: Rolling Walker  Activity: To/from bathroom  Assist Level: Minimal assistance  Functional Mobility Comments: vc's for sequencing & planes     Left Hand Strength -    Handle Setting 2: 30# & norm for pta ge & gender is 41#  Right Hand Strength -    Handle Setting 2: 35# & norm for pt age & gender is 50#    Fine Motor Skills  Left 9-Hole Peg Test: Pt unable to perform 9-hole peg test- norm for tp age & gender is 20.3 seconds  Right 9-Hole Peg Test: 35.1 seconds & norm for pt age & gender is 19.0 seconds      Plan   Times per week: OT twice a day for 2 weeks to address above problem areas  Times per day: Twice a day  Current Treatment Recommendations: Strengthening, Gait Training, Patient/Caregiver Education & Training, Home Management Training, ROM, Equipment Evaluation, Education, & procurement, Balance Training, Neuromuscular Re-education, Functional Mobility Training, Endurance Training, Positioning, Safety Education & Training, Self-Care / ADL    Assessment   Performance deficits / Impairments: Decreased functional mobility ; Decreased endurance;Decreased coordination;Decreased ADL status; Decreased balance;Decreased strength;Decreased safe awareness;Decreased fine motor control;Decreased high-level IADLs  Prognosis: Good  Decision Making: Medium Complexity  OT Education: OT Role;Plan of Care;Precautions; ADL Adaptive Strategies;Transfer Training;Energy Conservation  Patient Education: Pt educated with ergards to OT process. Pt participated in OT goal planning. Pt pleasant & cooperative thorughout the OT process.   REQUIRES OT FOLLOW UP: Yes  Activity Tolerance  Activity Tolerance: Patient Tolerated treatment well  Safety Devices  Safety Devices in place: Yes  Type of devices: Call light within reach         Goals  Long term goals  Time Frame for Long term goals : 2 weeks to address above problem areas  Long term goal 1: Pt demo Mod I to eat all meals  Long term goal 2: Pt demo MOd I grooming seated or standing  Long term goal 3: Pt demo SBA to bathe @shower level both seated & standing  Long term goal 4: Pt demo Mod I UE & LE dress & demo [address] [place] [address] term goal 5: Pt demo Mod I commode trf wtih appropiate DME to ensure pt safety  Long term goals 6: Pt demo SBA tub transfer with appropriate DME to ensure pt safety  Long term goal 7: Pt demo Mod i light homemaking task & demo G safety  Long term goal 8: Pt demo G- endurance for a 20-30 minute funcitonal activity  Long term goal 9: Pt demo improvements in her  & coordination to improved independence with ADLs & IADLs as evidenced by gains made in: dynamometer L hand 30# & norm for pt age & gender is 41#, R hand 35# & norm for pt age & gender is 50#; 9-hole peg test- L hand- Pt u/a to complete due to proprioceptive deficits. norm is 20.3 seconds & R hand 35.1 seconds & norm for pt age & gender is 19.0 seconds  Patient Goals   Patient goals : \"To cook & clean. \"     Therapy Time   Individual Concurrent Group Co-treatment   Time In 530-OT eval  540-OT rx         Time Out 540-OT evbal  625-OT rx         Minutes 54 Min OT total  10 Min OT eval  45 Min OT rx                   Mikael Sauer OTR/L [id number]

## 2020-12-24 NOTE — PROGRESS NOTES
Physical Therapy    Facility/Department: [address] [facility]  Initial Assessment    NAME: Heather Trinidad  : 1955  MRN: [id number]    Date of Service: 2020  Evaluating Therapist: Lance Olmstead PT, DPT    ROOM: [facility]  DIAGNOSIS: CVA  PRECAUTIONS: falls, L hemiparesis, significant L extremity proprioceptive deficits leading to sensory ataxia  HPI: Pt with past medical history significant for arthritis, anxiety, numbness/tingling of both legs, cancer, headaches; who presented to [facility] on 2020 with roughly 2 days of left-sided weakness.  The patient was not a candidate for TPA and/or thrombectomy. On imaging, she was found to have an acute ischemic lacunar infarct of the right thalamus. Social:  Pt lives with nephew in a 1 floor plan 1 step without rails + 4 steps with R HR to enter. Alternate entrance available with 4 GETACHEW and no HR. Her nephew works night shift and sleeps during the day. Pt's son and daughter live nearby and are willing to assist as needed. Prior to admission: Pt ambulated without AD, was functionally independent. 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 yes       Does pt have pain?  No c/o pain       Bed Mobility  Rolling: SBA  Supine to sit: SBA  Sit to supine: SBA  Scooting: SBA   Supervision Mod I   Transfers Sit to stand: ModA  Stand to sit: ModA  Stand pivot: ModA with [employer]   SBA Mod I   Ambulation    100 feet with [employer] with Debbie/ModA   200 feet with AAD with  feet with AAD with Mod I   Walking 10 feet on uneven surface 10 feet with [employer] with ModA   10 feet with AAD with SBA 10 feet with AAD with Mod I   Wheel Chair Mobility NT       Car Transfers Debbie with [employer]   SBA Mod I   Stair negotiation: ascended and descended  8 steps with 2 rails with Debbie/ModA   12 steps with 1 rail with SBA 12 steps with 1 rail with Mod I   Curb Step:   ascended and descended 2 inch step with [employer] and Debbie/ModA   7.5 inch step with AAD and SBA 7.5 inch step with AAD and Mod I   Picking up object off the floor Picked up cone with Min assist   Will  2lb weight with SB assist Will  4lb weight with Mod I   BLE ROM WNL       BLE Strength RLE grossly 5/5  L hip flexion 3+/5  L knee extension 4-/5  L ankle DF 4-/5 L ankle evertors 2/5  L ankle invertors 4/5       Balance  Static and dynamic standing balance Debbie/ModA with [employer]       Date Family Teach Completed        Is additional Family Teaching Needed? Y or N        Hindering Progress Balance, L extremity hemiparesis/coordination deficits       PT recommended ELOS 4 weeks       Team's Discharge Plan        Therapist at Team Meeting          Pt is alert and Oriented x3  Sensation: Pt exhibits grossly impaired proprioception of L extremities  Edema: no  Endurance: fair   Skin was inspected: grossly intact     ASSESSMENT  Pt displays functional ability as noted in the objective portion of this evaluation. Comments:  Pt received seated in WC, pleasant and agreeable to session. Pt able to follow all simple commands without difficulty. Pt denies visual changes following CVA. Gross proprioceptive deficits noted with L extremities leading to functional ataxia. Pt has difficulty coordinating reciprocal gait pattern due to impaired L foot placement and difficulty maintaining direct path due to uneven force production of BUE. Frequent verbal cueing required to maintain adequate KELLY and to ensure proper BLE placement prior to sit<>Stand transfer. Anticipate functional improvement with task oriented intensive rehabilitation training. Plan to progress mobility training with [employer] to promote reciprocal pattern.      Patient education  Pt educated on safe hand placement with transfers    Patient response to education:   Pt verbalized understanding Pt demonstrated skill Pt requires further education in this area   yes partial yes     Rehab potential is Good for reaching above PT goals. Pts/ family goals   1. Return home    Patient and or family understand(s) diagnosis, prognosis, and plan of care. PLAN  PT care will be provided in accordance with the objectives noted above. Whenever appropriate, clear delegation orders will be provided for nursing staff. Exercises and functional mobility practice will be used as well as appropriate assistive devices or modalities to obtain goals. Patient and family education will also be administered as needed. Frequency of treatments will be 2x/day M-F and 1x/day Sat or Sun x  28 days.     Time in: 0830  Time out: [address] [facility] [address], PT, DPT  [id number]

## 2020-12-24 NOTE — PROGRESS NOTES
Occupational Therapy  OCCUPATIONAL THERAPY DAILY NOTE    Date:2020  Patient Name: Inge Segura  MRN: [id number]  : 1955  Room: [address] [place] [address]-A     Referring Practitioner: Bebo Wise MD  Diagnosis: CVA  Additional Pertinent Hx: arthritis, anxiety, hx cancer  Restrictions: Fall Risk, LUE/LLE proprioceptive deficits, L ankle instabilty- improves with shoes on,     Functional Assessment:   Date Status AE  Comments   Feeding 20 s/u      Grooming 20 Min A   Assist to put her hair in a pony tail3         Oral Care 20 Supervision  seated    Bathing 20 Minimal Assist  Seated & standing Sponge bathing as pt has IV in her R arm   UB Dressing 20 Supervision   Increased time   LB Dressing 20 Minimal Assist   Min vc's for strategies & awareness fo LUE   Footwear 20 Supervision   Increased time   Toileting 20 Minimal Assist   vc's for hand placement LUE   Homemaking 20 TBA        Functional Transfers / Balance:   Date Status DME  Comments   Sit Balance  Supervision   Seated EOB   Stand Balance 20 Minimal Assist  rw vc's for posture & foot placement pt tends to keep a narrow base of support   [] Tub  [] Shower   Transfer 20 TBA      Commode   Transfer 20 Minimal Assist  rw Min vc's for sequencing & safety ambulating with a rw   Functional   Mobility 20 Minimal Assist  rw \"   Other:         Functional Exercises / Activity:   Pt enjoyed making a [place] ornament & focused on using her LUE to improve coordination. Pt ambulated with Min A to the [place] tree. Pt require vc's for posture & safety while moving.  Pt tolerated [place] peg board using her LUE with increased time    Sensory / Neuromuscular Re-Education:      Cognitive Skills:   Status Comments   Problem   Solving fair  During ADL   Memory good  \"   Sequencing good  \"   Safety fair  Impaired due to proprioceptive deficits     Visual Perception:    Education:  Pt educated with regards to dressing strategies. Pt also educated with regards to sequencing & safety using a rw during mobility & transfers. [] Family teach completed on:    Pain Level: Pt did not c/o pain during OT session    Additional Notes:       Patient has made good  progress during treatment sessions toward set goals. Therapy emphasis to obtain goals:ADL retraining, transfers training, endurance/balance retraining, therex, homemaking & pt/family education       [x] Continue with current OT Plan of care.   [] Prepare for Discharge     DISCHARGE RECOMMENDATIONS  Recommended DME:    Post Discharge Care:   []Home Independently  []Home with 24hr Care / Supervision []Home with Partial Supervision []Home with Home Health OT []Home with Out Pt OT []Other: ___   Comments:         Time in Time out Tx Time Breakdown  Variance:   First Session  eval+rx  [] Individual Tx-   [] Concurrent Tx -  [] Co-Tx -   [] Group Tx -   [] Time Missed -     Second Session 1000 1100 [] Individual Tx-   [x] Concurrent Tx - 61 Min [] Co-Tx -   [] Group Tx -   [] Time Missed -     Third Session    [] Individual Tx-   [] Concurrent Tx -  [] Co-Tx -   [] Group Tx -   [] Time Missed -         Total Tx Time: [address] [facility]  OTR/L [id number]

## 2020-12-24 NOTE — CARE COORDINATION
Social Work Assessment Summary    PCP:Rodrick Salgado  Patient Resides: In a one floor apartment with her nephew Tete Valle :4 steps to enter one floor unit. Laundry on same floor  Will you return to your own home? yes       Primary Caregiver: Janice Domingo works full time. Patient has 3 children  In the area . Son Linette Camp is in good health and drives and works full time. Daughter Yuli Rao is not working but does not drive. Another daughter Kiel Taveras also lives in the area and is currently not working but also does not drive.        Level of Function PTA : Independent all areas  :Employment: Worked as a  bur was injured at work 5 years ago and is on disability social security  Receives SSD yes Reason: as above     DME Pta  :none    Community Agency Involvement PTA :none    Do they have a medical profile:no    Family Teaching: TBS    Strength: Very active prior and has strong motivation to improve    Preference: Get better and go home ASAP    NAME [address] [place] [address] # WORK # CELL #   Cuco Favorite  son [address] daughter [phone]              Height: 5'6\"  Weight: 117

## 2020-12-25 PROCEDURE — 6370000000 HC RX 637 (ALT 250 FOR IP): Performed by: PHYSICAL MEDICINE & REHABILITATION

## 2020-12-25 PROCEDURE — 6370000000 HC RX 637 (ALT 250 FOR IP): Performed by: INTERNAL MEDICINE

## 2020-12-25 PROCEDURE — 1280000000 HC REHAB R&B

## 2020-12-25 RX ADMIN — GABAPENTIN 300 MG: 300 CAPSULE ORAL at 11:44

## 2020-12-25 RX ADMIN — Medication 3 MG: at 22:06

## 2020-12-25 RX ADMIN — TIZANIDINE 4 MG: 4 TABLET ORAL at 11:44

## 2020-12-25 RX ADMIN — CLOPIDOGREL 75 MG: 75 TABLET, FILM COATED ORAL at 08:09

## 2020-12-25 RX ADMIN — ATORVASTATIN CALCIUM 40 MG: 40 TABLET, FILM COATED ORAL at 19:46

## 2020-12-25 RX ADMIN — CETIRIZINE HYDROCHLORIDE 10 MG: 10 TABLET, FILM COATED ORAL at 19:46

## 2020-12-25 RX ADMIN — DOCUSATE SODIUM 100 MG: 100 CAPSULE, LIQUID FILLED ORAL at 08:09

## 2020-12-25 RX ADMIN — Medication 1 TABLET: at 08:09

## 2020-12-25 RX ADMIN — TIZANIDINE 4 MG: 4 TABLET ORAL at 06:08

## 2020-12-25 RX ADMIN — GABAPENTIN 300 MG: 300 CAPSULE ORAL at 19:46

## 2020-12-25 RX ADMIN — TIZANIDINE 4 MG: 4 TABLET ORAL at 19:46

## 2020-12-25 RX ADMIN — LEVOTHYROXINE SODIUM 50 MCG: 0.05 TABLET ORAL at 06:08

## 2020-12-25 RX ADMIN — ASPIRIN 81 MG: 81 TABLET, COATED ORAL at 08:09

## 2020-12-25 RX ADMIN — GABAPENTIN 300 MG: 300 CAPSULE ORAL at 03:42

## 2020-12-25 ASSESSMENT — PAIN SCALES - GENERAL: PAINLEVEL_OUTOF10: 0

## 2020-12-25 NOTE — PROGRESS NOTES
Progress Note - Covering Dr. Bebo Wise    Subjective/   72y.o. year old female on the rehab unit for stroke. No complaints today. No SOB or chest pain. No dizziness. Tolerating therapy. Jonas Amos for discharge. Objective/   VITALS:  /65   Pulse 67   Temp 98.9 °F (37.2 °C) (Temporal)   Resp 15   Ht 5' 6\" (1.676 m)   Wt 117 lb (53.1 kg)   SpO2 95%   BMI 18.88 kg/m²   24HR INTAKE/OUTPUT:      Intake/Output Summary (Last 24 hours) at 12/25/2020 1630  Last data filed at 12/25/2020 1232  Gross per 24 hour   Intake 780 ml   Output --   Net 780 ml     Constitutional:  Alert, awake, no apparent distress   Cardiovascular:  S1, S2 without m/r/g   Respiratory:  CTA B without w/r/r   Abdomen: +BS  Ext: no pitting LE edema, no calf tenderness or cords  Neuro: awake and alert, good sitting balance, no tremor. Independent with bed mobility    Functional Level    Initial Evaluation  12/24/20 AM     PM    Short Term Goals Long Term Goals    Was pt agreeable to Eval/treatment? yes           Does pt have pain?  No c/o pain           Bed Mobility  Rolling: SBA  Supine to sit: SBA  Sit to supine: SBA  Scooting: SBA     Supervision Mod I   Transfers Sit to stand: ModA  Stand to sit: ModA  Stand pivot: ModA with [employer]     SBA Mod I   Ambulation    100 feet with [employer] with Debbie/ModA     200 feet with AAD with  feet with AAD with Mod I   Walking 10 feet on uneven surface 10 feet with [employer] with ModA     10 feet with AAD with SBA 10 feet with AAD with Mod I   Wheel Chair Mobility NT           Car Transfers Debbie with [employer]     SBA Mod I   Stair negotiation: ascended and descended  8 steps with 2 rails with Debbie/ModA     12 steps with 1 rail with SBA 12 steps with 1 rail with Mod I   Curb Step:   ascended and descended 2 inch step with [employer] and Debbie/ModA     7.5 inch step with AAD and SBA 7.5 inch step with AAD and Mod I   Picking up object off the floor Picked up cone with Min assist     Will  2lb weight with SB assist Will  4lb weight with Mod I   BLE ROM WNL           BLE Strength RLE grossly 5/5  L hip flexion 3+/5  L knee extension 4-/5  L ankle DF 4-/5 L ankle evertors 2/5  L ankle invertors 4/5           Balance  Static and dynamic standing balance Debbie/ModA with [employer]                 Scheduled Meds:   aspirin  81 mg Oral Daily    atorvastatin  40 mg Oral Nightly    cetirizine  10 mg Oral Nightly    clopidogrel  75 mg Oral Daily    gabapentin  300 mg Oral Q8H    levothyroxine  50 mcg Oral Daily    tiZANidine  4 mg Oral Q8H    enoxaparin  40 mg Subcutaneous Daily    therapeutic multivitamin-minerals  1 tablet Oral Daily    vitamin D  50,000 Units Oral Weekly    melatonin  3 mg Oral Nightly    docusate sodium  100 mg Oral BID    senna  2 tablet Oral Nightly    influenza virus vaccine  0.5 mL Intramuscular Prior to discharge     Continuous Infusions:  PRN Meds:acetaminophen **OR** [DISCONTINUED] acetaminophen, oxyCODONE **OR** oxyCODONE, ondansetron, calcium carbonate, magnesium hydroxide, hydrALAZINE  I/O last 3 completed shifts: In: 780 [P.O.:780]  Out: -   No intake/output data recorded. Labs reviewed  CBC: No results for input(s): WBC, HGB, PLT in the last 72 hours. BMP:  No results for input(s): NA, K, CL, CO2, BUN, CREATININE, GLUCOSE in the last 72 hours. Hepatic: No results for input(s): AST, ALT, ALB, BILITOT, ALKPHOS in the last 72 hours. BNP: No results for input(s): BNP in the last 72 hours. Lipids: No results for input(s): CHOL, HDL in the last 72 hours. Invalid input(s): LDLCALCU  INR: No results for input(s): INR in the last 72 hours. Assessment/  Patient Active Problem List:     Cerebral atherosclerosis     Peripheral neuropathy due to ischemia     History of cerebral infarction     Right thalamic stroke (HCC)     Tobacco abuse     Peripheral neuropathy     Hypothyroidism     Muscle spasm     Acute CVA (cerebrovascular accident) ([facility] [address]      Plan/  1.  Continue rehab program  2. Continue dvt prophylaxis  3. Continue secondary stroke prevention  4. Continue skin care  5.  Continue other medications without change          Milla Alvarez MD

## 2020-12-26 PROCEDURE — 97535 SELF CARE MNGMENT TRAINING: CPT

## 2020-12-26 PROCEDURE — 6370000000 HC RX 637 (ALT 250 FOR IP): Performed by: PHYSICAL MEDICINE & REHABILITATION

## 2020-12-26 PROCEDURE — 6370000000 HC RX 637 (ALT 250 FOR IP): Performed by: INTERNAL MEDICINE

## 2020-12-26 PROCEDURE — 1280000000 HC REHAB R&B

## 2020-12-26 PROCEDURE — 97530 THERAPEUTIC ACTIVITIES: CPT

## 2020-12-26 RX ADMIN — Medication 3 MG: at 22:42

## 2020-12-26 RX ADMIN — ASPIRIN 81 MG: 81 TABLET, COATED ORAL at 08:27

## 2020-12-26 RX ADMIN — GABAPENTIN 300 MG: 300 CAPSULE ORAL at 20:45

## 2020-12-26 RX ADMIN — CETIRIZINE HYDROCHLORIDE 10 MG: 10 TABLET, FILM COATED ORAL at 20:45

## 2020-12-26 RX ADMIN — ATORVASTATIN CALCIUM 40 MG: 40 TABLET, FILM COATED ORAL at 20:45

## 2020-12-26 RX ADMIN — TIZANIDINE 4 MG: 4 TABLET ORAL at 11:22

## 2020-12-26 RX ADMIN — DOCUSATE SODIUM 100 MG: 100 CAPSULE, LIQUID FILLED ORAL at 20:46

## 2020-12-26 RX ADMIN — Medication 1 TABLET: at 08:28

## 2020-12-26 RX ADMIN — CLOPIDOGREL 75 MG: 75 TABLET, FILM COATED ORAL at 08:27

## 2020-12-26 RX ADMIN — GABAPENTIN 300 MG: 300 CAPSULE ORAL at 02:52

## 2020-12-26 RX ADMIN — GABAPENTIN 300 MG: 300 CAPSULE ORAL at 11:22

## 2020-12-26 RX ADMIN — LEVOTHYROXINE SODIUM 50 MCG: 0.05 TABLET ORAL at 05:44

## 2020-12-26 RX ADMIN — TIZANIDINE 4 MG: 4 TABLET ORAL at 02:52

## 2020-12-26 RX ADMIN — TIZANIDINE 4 MG: 4 TABLET ORAL at 20:44

## 2020-12-26 NOTE — PROGRESS NOTES
Occupational Therapy  OCCUPATIONAL THERAPY DAILY NOTE    Date:2020  Patient Name: Moustapha Espinal  MRN: [id number]  : 1955  Room: [address] [place] [address]A     Referring Practitioner: Yina Joe MD  Diagnosis: CVA  Additional Pertinent Hx: arthritis, anxiety, hx cancer  Restrictions: Fall Risk, LUE/LLE proprioceptive deficits, L ankle instabilty- improves with shoes on,     Functional Assessment:   Date Status AE  Comments   Feeding 20 s/u   Pt seated upright in w/c eating of breakfast meal, pt able to grasp cup, bring to mouth and hold utensil, retrieve food and bring to mouth   Grooming 20 Min A   Pt washed face, combed hair, blow dried hair, assistance to pull hair back into pony tail         Oral Care 20 Supervision  seated Pt applied chuck  to dentures, placing in mouth seated upright in w/c at sink   Bathing 20 Armida Seated & standing Pt completed showering task seated/standing, with pt able to wash of UB/LB, with CGA to stand and wash of buttocks for safety with use of grab bars   UB Dressing 20 Supervision   Pt donned/doffed pullover shirt   LB Dressing 20 Armida  Pt donned/doffed underwear and pants, with CGA to stand and pull over hips for safety   Footwear 20 Supervision   Pt donned/doffed socks using cross over technique   Toileting 20 Armida  Pt completed toileting task on standard commode, with pt able to complete hygiene, with CGA for safety to stand with clothing management, and transfer with use of grab bars   Homemaking 20 TBA        Functional Transfers / Balance:   Date Status DME  Comments   Sit Balance 20 Supervision   Pt sat supported uprtight in w/c and unsupported on shower bench   Stand Balance 20 Armida rw Pt stood during transfers, and ADL tasks   [] Tub  [x] Shower   Transfer 20 Armida  SPT from w/c<>shower bench with use of grab bars   Commode   Transfer 20 Armida rw SPT from w/c<>commode with use of grab bar   Functional

## 2020-12-26 NOTE — PROGRESS NOTES
Progress Note - covering Dr. Ankur Grajeda    Subjective/   72y.o. year old female on the rehab unit for stroke. No new complaints. Tolerating therapy program.  No chest pain or palpitations. No dizziness.            Objective/   VITALS:  /69   Pulse 76   Temp 98.1 °F (36.7 °C) (Temporal)   Resp 18   Ht 5' 6\" (1.676 m)   Wt 117 lb (53.1 kg)   SpO2 96%   BMI 18.88 kg/m²   24HR INTAKE/OUTPUT:      Intake/Output Summary (Last 24 hours) at 12/26/2020 1457  Last data filed at 12/25/2020 1854  Gross per 24 hour   Intake 480 ml   Output --   Net 480 ml     Constitutional:  Alert, awake, no apparent distress   Cardiovascular:  S1, S2 without m/r/g   Respiratory:  CTA B without w/r/r   Abdomen: +BS  Ext: no pitting LE edema, no calf tenderness  Neuro: awake and alert, wasting of intrinsics    Functional Level      Date   Status   AE    Comments     Feeding   12/26/20   s/u        Pt seated upright in w/c eating of breakfast meal, pt able to grasp cup, bring to mouth and hold utensil, retrieve food and bring to mouth     Grooming   12/26/20   Min A        Pt washed face, combed hair, blow dried hair, assistance to pull hair back into pony tail           Oral Care   12/26/20   Supervision    seated   Pt applied chuck  to dentures, placing in mouth seated upright in w/c at sink     Bathing   12/26/20   Armida/CGA   Seated & standing   Pt completed showering task seated/standing, with pt able to wash of UB/LB, with CGA to stand and wash of buttocks for safety with use of grab bars     UB Dressing   12/26/20   Supervision        Pt donned/doffed pullover shirt     LB Dressing   12/26/20   Armida/CGA       Pt donned/doffed underwear and pants, with CGA to stand and pull over hips for safety     Footwear   12/26/20   Supervision        Pt donned/doffed socks using cross over technique     Toileting   12/26/20   Armida/CGA       Pt completed toileting task on standard commode, with pt able to complete hygiene, with CGA for safety to stand with clothing management, and transfer with use of grab bars     Homemaking   12/24/20   TBA                   Functional Transfers / Balance:      Date Status DME  Comments   Sit Balance 12/26/20 Supervision    Pt sat supported uprtight in w/c and unsupported on shower bench   Stand Balance 12/26/20 Armida/CGA rw Pt stood during transfers, and ADL tasks   []? Tub  [x]? Shower   Transfer 12/26/20 Armida/CGA   SPT from w/c<>shower bench with use of grab bars   Commode   Transfer 12/26/20 Armida/CGA rw SPT from w/c<>commode with use of grab bar   Functional   Mobility 12/24/20 Minimal Assist  rw     Other:  Bed Mobility    12/26/20    SBA      Supine<>EOB with use of bed rails and HOB slightly elevated      Functional Exercises / Activity:     Sensory / Neuromuscular Re-Education:        Cognitive Skills:    Status Comments   Problem   Solving fair  During ADL   Memory good  \"   Sequencing good  \"   Safety fair  Impaired due to proprioceptive deficits          Scheduled Meds:   aspirin  81 mg Oral Daily    atorvastatin  40 mg Oral Nightly    cetirizine  10 mg Oral Nightly    clopidogrel  75 mg Oral Daily    gabapentin  300 mg Oral Q8H    levothyroxine  50 mcg Oral Daily    tiZANidine  4 mg Oral Q8H    enoxaparin  40 mg Subcutaneous Daily    therapeutic multivitamin-minerals  1 tablet Oral Daily    vitamin D  50,000 Units Oral Weekly    melatonin  3 mg Oral Nightly    docusate sodium  100 mg Oral BID    senna  2 tablet Oral Nightly    influenza virus vaccine  0.5 mL Intramuscular Prior to discharge     Continuous Infusions:  PRN Meds:acetaminophen **OR** [DISCONTINUED] acetaminophen, oxyCODONE **OR** oxyCODONE, ondansetron, calcium carbonate, magnesium hydroxide, hydrALAZINE  I/O last 3 completed shifts: In: 1020 [P.O.:1020]  Out: -   No intake/output data recorded. Labs reviewed  CBC: No results for input(s): WBC, HGB, PLT in the last 72 hours.   BMP:  No results for input(s): NA, K, CL, CO2, BUN, CREATININE, GLUCOSE in the last 72 hours. Hepatic: No results for input(s): AST, ALT, ALB, BILITOT, ALKPHOS in the last 72 hours. BNP: No results for input(s): BNP in the last 72 hours. Lipids: No results for input(s): CHOL, HDL in the last 72 hours. Invalid input(s): LDLCALCU  INR: No results for input(s): INR in the last 72 hours. Assessment/  Patient Active Problem List:     Cerebral atherosclerosis     Peripheral neuropathy due to ischemia     History of cerebral infarction     Right thalamic stroke (HCC)     Tobacco abuse     Peripheral neuropathy     Hypothyroidism     Muscle spasm     Acute CVA (cerebrovascular accident) ([facility] [address]      Plan/  1. continue rehab program  2. Increase activity as able  3. Continue dvt prophylaxis  4.  Continue secondary stroke prevention            Mike Hemphill MD

## 2020-12-27 PROCEDURE — 6370000000 HC RX 637 (ALT 250 FOR IP): Performed by: PHYSICAL MEDICINE & REHABILITATION

## 2020-12-27 PROCEDURE — 6370000000 HC RX 637 (ALT 250 FOR IP): Performed by: INTERNAL MEDICINE

## 2020-12-27 PROCEDURE — 97530 THERAPEUTIC ACTIVITIES: CPT

## 2020-12-27 PROCEDURE — 1280000000 HC REHAB R&B

## 2020-12-27 RX ADMIN — ASPIRIN 81 MG: 81 TABLET, COATED ORAL at 08:22

## 2020-12-27 RX ADMIN — DOCUSATE SODIUM 100 MG: 100 CAPSULE, LIQUID FILLED ORAL at 20:47

## 2020-12-27 RX ADMIN — Medication 1 TABLET: at 08:22

## 2020-12-27 RX ADMIN — Medication 3 MG: at 21:59

## 2020-12-27 RX ADMIN — TIZANIDINE 4 MG: 4 TABLET ORAL at 11:20

## 2020-12-27 RX ADMIN — GABAPENTIN 300 MG: 300 CAPSULE ORAL at 04:02

## 2020-12-27 RX ADMIN — GABAPENTIN 300 MG: 300 CAPSULE ORAL at 11:20

## 2020-12-27 RX ADMIN — ATORVASTATIN CALCIUM 40 MG: 40 TABLET, FILM COATED ORAL at 20:46

## 2020-12-27 RX ADMIN — CLOPIDOGREL 75 MG: 75 TABLET, FILM COATED ORAL at 08:22

## 2020-12-27 RX ADMIN — SENNOSIDES 17.2 MG: 8.6 TABLET, FILM COATED ORAL at 20:46

## 2020-12-27 RX ADMIN — GABAPENTIN 300 MG: 300 CAPSULE ORAL at 19:57

## 2020-12-27 RX ADMIN — CETIRIZINE HYDROCHLORIDE 10 MG: 10 TABLET, FILM COATED ORAL at 20:46

## 2020-12-27 RX ADMIN — TIZANIDINE 4 MG: 4 TABLET ORAL at 04:02

## 2020-12-27 RX ADMIN — TIZANIDINE 4 MG: 4 TABLET ORAL at 19:57

## 2020-12-27 RX ADMIN — LEVOTHYROXINE SODIUM 50 MCG: 0.05 TABLET ORAL at 06:35

## 2020-12-27 ASSESSMENT — PAIN SCALES - GENERAL: PAINLEVEL_OUTOF10: 0

## 2020-12-27 NOTE — PROGRESS NOTES
I   Stair negotiation: ascended and descended  8 steps with 2 rails with Debbie/ModA 12 steps with 2 rails with Min A  12 steps with 1 rail with SBA 12 steps with 1 rail with Mod I   Curb Step:   ascended and descended 2 inch step with [employer] and Debbie/ModA NT  7.5 inch step with AAD and SBA 7.5 inch step with AAD and Mod I   Picking up object off the floor Picked up cone with Min assist NT  Will  2lb weight with SB assist Will  4lb weight with Mod I   BLE ROM WNL       BLE Strength RLE grossly 5/5  L hip flexion 3+/5  L knee extension 4-/5  L ankle DF 4-/5 L ankle evertors 2/5  L ankle invertors 4/5       Balance  Static and dynamic standing balance Debbie/ModA with [employer] Static and dynamic standing balance Debbie/ModA with [employer]      Date Family Teach Completed        Is additional Family Teaching Needed? Y or N        Hindering Progress Balance, L extremity hemiparesis/coordination deficits Balance, L extremity hemiparesis/coordination deficits      PT recommended ELOS 4 weeks       Team's Discharge Plan        Therapist at Team Meeting          Therapeutic exercise:  AM: STS x 10, L foot target placement on 2 inch box 40x with 75% accuracy, Gait 75 feet x 2 with [employer]     Patient education  Pt educated on safe hand placement with transfers, attention to LUE    Patient response to education:   Pt verbalized understanding Pt demonstrated skill Pt requires further education in this area   Yes  Partially with verbal cues and assist Yes      Additional comments: Patient very motivated to participate as she is eager to get home. Patient with poor insight to functional deficits of left side and cued for attention (especially LUE) throughout session. Patient demonstrated improved L foot placement with gait initially, but with distance (after approximately 50 feet) and fatigue gait quality diminished.  Patient with poor awareness of deficits and can be moderately unsteady on her feet due to L foot drag vs crossing midline on turns and when fatigued. Patient with improved safety and gait quality on repeated shorter distances. Patient performed stairs in non-reciprocal manner with poor attention to LUE during stair descend and cued for advancing L hand with each task. Patient required verbal cues for bilateral foot stance during STS task 50% to improve balance and safety. Patient able to grasp WC prior to sitting, but unable to maintain and LUE would drop to the side of the chair. Patient given cues for LUE attention during task with fair carryover. Patient transported to her room and assisted back to bed following treatment.      Time in: 1000  Time out: [address] [place], [place], DPT  License [id number]

## 2020-12-28 PROCEDURE — 1280000000 HC REHAB R&B

## 2020-12-28 PROCEDURE — 99233 SBSQ HOSP IP/OBS HIGH 50: CPT | Performed by: PHYSICAL MEDICINE & REHABILITATION

## 2020-12-28 PROCEDURE — 6370000000 HC RX 637 (ALT 250 FOR IP): Performed by: PHYSICAL MEDICINE & REHABILITATION

## 2020-12-28 PROCEDURE — 97535 SELF CARE MNGMENT TRAINING: CPT

## 2020-12-28 PROCEDURE — 97530 THERAPEUTIC ACTIVITIES: CPT

## 2020-12-28 PROCEDURE — 6370000000 HC RX 637 (ALT 250 FOR IP): Performed by: INTERNAL MEDICINE

## 2020-12-28 PROCEDURE — 97110 THERAPEUTIC EXERCISES: CPT

## 2020-12-28 RX ADMIN — ASPIRIN 81 MG: 81 TABLET, COATED ORAL at 07:40

## 2020-12-28 RX ADMIN — GABAPENTIN 300 MG: 300 CAPSULE ORAL at 21:19

## 2020-12-28 RX ADMIN — GABAPENTIN 300 MG: 300 CAPSULE ORAL at 13:19

## 2020-12-28 RX ADMIN — CETIRIZINE HYDROCHLORIDE 10 MG: 10 TABLET, FILM COATED ORAL at 21:19

## 2020-12-28 RX ADMIN — SENNOSIDES 17.2 MG: 8.6 TABLET, FILM COATED ORAL at 21:19

## 2020-12-28 RX ADMIN — DOCUSATE SODIUM 100 MG: 100 CAPSULE, LIQUID FILLED ORAL at 21:19

## 2020-12-28 RX ADMIN — TIZANIDINE 4 MG: 4 TABLET ORAL at 21:19

## 2020-12-28 RX ADMIN — Medication 1 TABLET: at 07:40

## 2020-12-28 RX ADMIN — GABAPENTIN 300 MG: 300 CAPSULE ORAL at 06:48

## 2020-12-28 RX ADMIN — ATORVASTATIN CALCIUM 40 MG: 40 TABLET, FILM COATED ORAL at 21:19

## 2020-12-28 RX ADMIN — CLOPIDOGREL 75 MG: 75 TABLET, FILM COATED ORAL at 07:40

## 2020-12-28 RX ADMIN — LEVOTHYROXINE SODIUM 50 MCG: 0.05 TABLET ORAL at 06:48

## 2020-12-28 RX ADMIN — Medication 3 MG: at 21:21

## 2020-12-28 RX ADMIN — TIZANIDINE 4 MG: 4 TABLET ORAL at 13:19

## 2020-12-28 RX ADMIN — TIZANIDINE 4 MG: 4 TABLET ORAL at 06:48

## 2020-12-28 NOTE — PROGRESS NOTES
Physical Therapy    Facility/Department: [address] REHAB  Treatment Note    NAME: Shital Parker  : 1955  MRN: [id number]    Date of Service: 2020  Evaluating Therapist: Christy Stone PT, DPT    ROOM: [facility]  DIAGNOSIS: CVA  PRECAUTIONS: falls, L hemiparesis, significant L extremity proprioceptive deficits leading to sensory ataxia  HPI: Pt with past medical history significant for arthritis, anxiety, numbness/tingling of both legs, cancer, headaches; who presented to [facility] on 2020 with roughly 2 days of left-sided weakness.  The patient was not a candidate for TPA and/or thrombectomy. On imaging, she was found to have an acute ischemic lacunar infarct of the right thalamus. Social:  Pt lives with nephew in a 1 floor plan 1 step without rails + 4 steps with R HR to enter. Alternate entrance available with 4 GETACHEW and no HR. Her nephew works night shift and sleeps during the day. Pt's son and daughter live nearby and are willing to assist as needed. Prior to admission: Pt ambulated without AD, was functionally independent. 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 yes Yes  yes     Does pt have pain?  No c/o pain No c/o pain No c/o pain     Bed Mobility  Rolling: SBA  Supine to sit: SBA  Sit to supine: SBA  Scooting: SBA Modified Independent all aspects (twin bed) NT Supervision Mod I   Transfers Sit to stand: ModA  Stand to sit: ModA  Stand pivot: ModA with [employer] Sit to stand: SBA  Stand to sit: CGA  Stand pivot: CGA with [employer] Sit to stand: SBA  Stand to sit: CGA  Stand pivot: CGA with [employer] SBA Mod I   Ambulation    100 feet with [employer] with Debbie/ModA 300 feet x 1 rep and 125 feet x 2 reps with [employer] with  feet x 1 rep and 125 feet x 2 reps with [employer] with  feet with AAD with  feet with AAD with Mod I   Walking 10 feet on uneven surface 10 feet with [employer] with ModA NT NT 10 feet with AAD with SBA 10 feet with AAD with Mod I   Wheel Chair Mobility NT NA NA     Car Transfers Debbie with [employer] NT CGA SBA Mod I   Stair negotiation: ascended and descended  8 steps with 2 rails with Debbie/ModA 12 steps with 2 rails with CGA  8 steps with 1 HR with Debbie NT 12 steps with 1 rail with SBA 12 steps with 1 rail with Mod I   Curb Step:   ascended and descended 2 inch step with [employer] and Debbie/ModA NT NT 7.5 inch step with AAD and SBA 7.5 inch step with AAD and Mod I   Picking up object off the floor Picked up cone with Min assist NT NT Will  2lb weight with SB assist Will  4lb weight with Mod I   BLE ROM WNL WNL      BLE Strength RLE grossly 5/5  L hip flexion 3+/5  L knee extension 4-/5  L ankle DF 4-/5 L ankle evertors 2/5  L ankle invertors 4/5 RLE grossly 5/5  L hip flexion 3+/5  L knee extension 4-/5  L ankle DF 4-/5 L ankle evertors 3+/5  L ankle invertors 4/5      Balance  Static and dynamic standing balance Debbie/ModA with [employer] Static and dynamic standing balance CGA with [employer]      Date Family Teach Completed        Is additional Family Teaching Needed? Y or N        Hindering Progress Balance, L extremity hemiparesis/coordination deficits Balance, L extremity hemiparesis/coordination deficits      PT recommended ELOS 4 weeks 2 weeks      Team's Discharge Plan        Therapist at Team Meeting          Therapeutic Exercise:   AM: NA  PM: Weaving between 4 standing quad canes with [employer] x 4 reps with SBA/CGA    Patient education  Pt educated on appropriate AD approximation with turns/transfers    Patient response to education:   Pt verbalized understanding Pt demonstrated skill Pt requires further education in this area   yes partial yes     Additional Comments: Pt continues to exhibit functional impairment resulting from sensory ataxia of L extremities and overall general balance deficits. Impaired proprioception of L extremities leads to incoordination which significantly impacts function.  Pt

## 2020-12-28 NOTE — PROGRESS NOTES
Physical Therapy    Facility/Department: [address] REHAB  Weekly Team Notes    NAME: Jacqueline Morrow  : 1955  MRN: [id number]    Date of Service: 2020  Evaluating Therapist: Daryl Nick PT, DPT    ROOM: [facility]  DIAGNOSIS: CVA  PRECAUTIONS: falls, L hemiparesis, significant L extremity proprioceptive deficits leading to sensory ataxia  HPI: Pt with past medical history significant for arthritis, anxiety, numbness/tingling of both legs, cancer, headaches; who presented to [facility] on 2020 with roughly 2 days of left-sided weakness.  The patient was not a candidate for TPA and/or thrombectomy. On imaging, she was found to have an acute ischemic lacunar infarct of the right thalamus. Social:  Pt lives with nephew in a 1 floor plan 1 step without rails + 4 steps with R HR to enter. Alternate entrance available with 4 GETACHEW and no HR. Her nephew works night shift and sleeps during the day. Pt's son and daughter live nearby and are willing to assist as needed. Prior to admission: Pt ambulated without AD, was functionally independent. Pt worked as a caregiver. Initial Evaluation  20     Comments   Short Term Goals Long Term Goals    Was pt agreeable to Eval/treatment? yes Yes       Does pt have pain?  No c/o pain No c/o pain      Bed Mobility  Rolling: SBA  Supine to sit: SBA  Sit to supine: SBA  Scooting: SBA Modified Independent all aspects (twin bed)  Supervision Mod I   Transfers Sit to stand: ModA  Stand to sit: ModA  Stand pivot: ModA with [facility] Sit to stand: SBA  Stand to sit: CGA  Stand pivot: CGA with [facility] Sensory ataxia present with hand/foot placement and backing up to chair resulting in instability SBA Mod I   Ambulation    100 feet with [facility] with Debbie/ModA 300 feet x 1 rep with [facility] with CGA Difficulty maintaining direct walker path due to impaired UE force regulation, ataxic gait, mediolateral stability of L ankle improving 200 feet with AAD with  feet with AAD with Mod I   Wheel Chair Mobility NT NA      Car Transfers Debbie with [employer] CGA  SBA Mod I   Stair negotiation: ascended and descended  8 steps with 2 rails with Debbie/ModA 8 steps with 1 HR with Debbie  12 steps with 1 rail with SBA 12 steps with 1 rail with Mod I   BLE ROM WNL WNL      BLE Strength RLE grossly 5/5  L hip flexion 3+/5  L knee extension 4-/5  L ankle DF 4-/5 L ankle evertors 2/5  L ankle invertors 4/5 RLE grossly 5/5  L hip flexion 3+/5  L knee extension 4-/5  L ankle DF 4-/5 L ankle evertors 3+/5  L ankle invertors 4/5      Balance  Static and dynamic standing balance Debbie/ModA with [employer] Static and dynamic standing balance CGA with [employer]      Date Family Teach Completed        Is additional Family Teaching Needed? Y or N  Y      Hindering Progress Balance, L extremity hemiparesis/coordination deficits Balance, L extremity hemiparesis/coordination deficits      PT recommended ELOS 4 weeks 2 weeks      Team's Discharge Plan  Discharge Thursday 1/7/20      Therapist at Team Meeting  Bere Edmondson PT, DPT [id number]          Date:  12/28/20  Supporting factors:  Pt exhibits relatively intact safety, reports good social support and home architecture as well as PLOF  Barriers to discharge:  Sensory ataxia results from proprioceptive deficits leading to impaired coordination of L extremities.   Additional comments:  Pt anxious for discharge however would benefit from recommended ELOS as above  DME:  TBD ([employer] at this time)  After Care:  Johny Yoder PT, DPT  [id number]

## 2020-12-28 NOTE — PROGRESS NOTES
Occupational Therapy  OCCUPATIONAL THERAPY DAILY NOTE    Date:2020  Patient Name: Natacha Spencer  MRN: [id number]  : 1955  Room: [address] [place] [address]A     Referring Practitioner: Marcela Stevens MD  Diagnosis: CVA  Additional Pertinent Hx: arthritis, anxiety, hx cancer  Restrictions: Fall Risk, LUE/LLE proprioceptive deficits, L ankle instabilty- improves with shoes on,     Functional Assessment:   Date Status AE  Comments   Feeding 20 s/u      Grooming 20 Min A            Oral Care 20 Supervision  seated    Bathing 20 Armida Seated & standing    UB Dressing 20 Supervision      LB Dressing 20  Armida     Footwear 20  Supervision   Pt donned velcro fastened shoes seated in w/c    Toileting 20 Armida     Homemaking 20  Min A  ww Pt completed light meal prep at ww level. Pt needed Min A for balance when reaching to retrieve items from refrigerator.  Pt also needed assist for balance when standing and engaging BUE's in activity      Functional Transfers / Balance:   Date Status DME  Comments   Sit Balance 20 Supervision      Stand Balance 20  CGA/Min A  ww During homemaking activity   [x] Tub      [x] Shower   Transfer 20  Min A     Min A  Ww, extended tub bench  Verbal cues for hand placement    Commode   Transfer 20 Armida rw    Functional   Mobility 20  Minimal Assist  rw Throughout therapy apt setting over tile and carpet floor surfaces   Pm session: throughout OT kitchen    Other:  On/off recliner, couch and standard bed    20    Minimal Assist    ww   Verbal cues for hand placement      Functional Exercises / Activity:  LUE gross motor coordination to left hand with pt completing graded clothespin activity   LUE strength exercises with arm bike 5 mins for 3 reps   L hand strength with 5 # digi flex 3 sets 15 reps     Sensory / Neuromuscular Re-Education:      Cognitive Skills:   Status Comments   Problem   Solving fair  During ADL   Memory good  \"   Sequencing good  \"   Safety fair  Impaired due to proprioceptive deficits     Visual Perception:    Education:  Pt educated on safety with  functional transfers and types of DME for tub/shower transfer   Pt was educated on safe retrieval and transport of items in kitchen setting with ww level     [] Family teach completed on:    Pain Level: Pt did not c/o pain during OT session    Additional Notes:       Patient has made good  progress during treatment sessions toward set goals. Therapy emphasis to obtain goals:ADL retraining, transfers training, endurance/balance retraining, therex, homemaking & pt/family education       [x] Continue with current OT Plan of care.   [] Prepare for Discharge     DISCHARGE RECOMMENDATIONS  Recommended DME:    Post Discharge Care:   []Home Independently  []Home with 24hr Care / Supervision []Home with Partial Supervision []Home with Home Health OT []Home with Out Pt OT []Other: ___   Comments:         Time in Time out Tx Time Breakdown  Variance:   First Session  [phone]  [id number] [x] Individual Tx-45  [] Concurrent Tx -  [] Co-Tx -   [] Group Tx -   [] Time Missed -     Second Session [phone] [x] Individual Tx- 45  [] Concurrent Tx -    [] Co-Tx -   [] Group Tx -   [] Time Missed -     Third Session    [] Individual Tx-   [] Concurrent Tx -  [] Co-Tx -   [] Group Tx -   [] Time Missed -         Total Tx Time: 90 mins        Paul Cook OTR/L [id number]

## 2020-12-28 NOTE — PROGRESS NOTES
Late Entry: Patient given a signed copy of The Important Message From Medicare.  Wilian Mcdaniels  12/28/2020  2:16 PM

## 2020-12-28 NOTE — PROGRESS NOTES
PM&R Progress Note  Patient: Tylor Pagenicol  Age/sex: 72 y.o. female  Medical Record #: [id number]  Location: 6061/3524-K  Admission date: 12/23/2020    CC: ARU follow up; CVA    S: Patient was seen and examined. No events over the holiday weekend. No acute events overnight. No complaints this AM - patient wants to go home. Interdisciplinary team meeting tomorrow afternoon. Denies chest pain, sob, abd pain. LBM 12/27, per patient. No change in [facility] since H&P done on 12/23/2020.     Meds:  Scheduled      aspirin, 81 mg, Daily      atorvastatin, 40 mg, Nightly      cetirizine, 10 mg, Nightly      clopidogrel, 75 mg, Daily      gabapentin, 300 mg, Q8H      levothyroxine, 50 mcg, Daily      tiZANidine, 4 mg, Q8H      enoxaparin, 40 mg, Daily      therapeutic multivitamin-minerals, 1 tablet, Daily      vitamin D, 50,000 Units, Weekly      melatonin, 3 mg, Nightly      docusate sodium, 100 mg, BID      senna, 2 tablet, Nightly      influenza virus vaccine, 0.5 mL, Prior to discharge        PRN      acetaminophen, 650 mg, Q6H PRN      oxyCODONE, 2.5 mg, Q6H PRN    Or      oxyCODONE, 5 mg, Q6H PRN      ondansetron, 4 mg, Q8H PRN      calcium carbonate, 500 mg, TID PRN      magnesium hydroxide, 30 mL, Daily PRN      hydrALAZINE, 25 mg, Q8H PRN        O:  Vitals:    12/27/20 0734 12/27/20 1630 12/28/20 0030 12/28/20 0750   BP: 121/65  138/76 117/63   Pulse: 60 64 55 76   Resp: 18  18 17   Temp: 98.6 °F (37 °C)  98.5 °F (36.9 °C) 98.3 °F (36.8 °C)   TempSrc: Temporal   Temporal   SpO2: 95% 96%  96%   Weight:       Height:           GEN: NAD, conversational   HEENT: NC/AT, PERRLA, EOMI  RESP: CTAB, no R/R/W   CV: +S1 S2, RR   ABD: soft, NT, ND, normal BS   : no black   EXT: Abnormal aROM, No clubbing/cyanosis, 2+ pulses   PSYCH: Normal mood and affect   NEURO: Alert, no new focal deficits     Labs:    Lab Results   Component Value Date    WBC 5.9 12/20/2020    HGB 14.1 12/20/2020    HCT 42.5 12/20/2020    MCV 93.6 12/20/2020     12/20/2020     Lab Results   Component Value Date     12/20/2020    K 3.7 12/20/2020     12/20/2020    CO2 25 12/20/2020    BUN 17 12/20/2020    CREATININE 0.9 12/20/2020    GLUCOSE 77 12/20/2020    CALCIUM 9.0 12/20/2020    PROT 7.0 12/19/2020    LABALBU 4.3 12/19/2020    BILITOT 0.5 12/19/2020    ALKPHOS 66 12/19/2020    AST 24 12/19/2020    ALT 12 12/19/2020    LABGLOM >60 12/20/2020    GFRAA >60 12/20/2020         Lab Results   Component Value Date    ALT 12 12/19/2020    AST 24 12/19/2020    ALKPHOS 66 12/19/2020    BILITOT 0.5 12/19/2020       Functional Status  Mobility:   General Mobility: SBA-minimal assistance  Gait:  100 feet with WW with Min/Mod A  Transfers: Minimal-moderate assistance  Bed Mobility: SBA    ADL's:    Feeding: Set up  Grooming: Minimal assistance  UB dressing: Supervision  LB dressing: Minimal assistance  UB bathing: Minimal assistance  LB bathing: Minimal assistance  Commode transfer: Minimal assistance  Functional mobility: Minimal assistance  Misc: Homemaking is TBA    Cognition: Baseline    Individualized Overall Plan of Care: I concur with the preadmission screen done on 12/23/2020 unless otherwise stated in post-admission evaluation. This patient will benefit from an inpatient rehab stay and care cannot be accomplished at a lower level due to medical necessity and risks of significant decompensation. Her rehab diagnosis is CVA with comorbidities of left-sided hemiparesis and ataxia, arthritis, anxiety, numbness/tingling of both legs, cancer, headaches. More detailed medical necessity is documented in [address] [place] [address] physician evaluation on 12/23/2020. She will begin a comprehensive rehabilitation program with intense therapies (Physiatry, RN, PT, OT, and possibly orthotics) for at least 3 hours/day, 5 days/week for an estimated duration of 2-4 weeks.   Current functional levels and goals are as follows:     PT -   Long term goals: Bed mobility - modified independent  Transfers - modified independent  Ambulation - 400 feet with AAD with Mod I  WC mobility - N/A  Car transters - modified independent    Frequency: 2x/day M-F and 1x/day Sat or Sun x  28 days. OT -   Long term goal 1: Pt demo Mod I to eat all meals  Long term goal 2: Pt demo MOd I grooming seated or standing  Long term goal 3: Pt demo SBA to bathe @shower level both seated & standing  Long term goal 4: Pt demo Mod I UE & LE dress & demo G- safety & insight  Long term goal 5: Pt demo Mod I commode trf wtih appropiate DME to ensure pt safety  Long term goals 6: Pt demo SBA tub transfer with appropriate DME to ensure pt safety  Long term goal 7: Pt demo Mod i light homemaking task & demo G safety  Long term goal 8: Pt demo G- endurance for a 20-30 minute funcitonal activity  Long term goal 9: Pt demo improvements in her  & coordination to improved independence with ADLs & IADLs as evidenced by gains made in: dynamometer L hand 30# & norm for pt age & gender is 41#, R hand 35# & norm for pt age & gender is 50#; 9-hole peg test- L hand- Pt u/a to complete due to proprioceptive deficits. norm is 20.3 seconds & R hand 35.1 seconds & norm for pt age & gender is 19.0 seconds    Frequency: 2x/day M-F and 1x/day Sat or Sun x 2 weeks    SLP -   Baseline    Frequency: not indicated    IMPRESSION:  Moustapha Espinal is a 72 y.o. right-handed female with PMH significant for arthritis, anxiety, numbness/tingling of both legs, cancer, headaches; who presented to [facility] on 12/19/2020 with roughly 2 days of left-sided weakness.  The patient was not a candidate for TPA and/or thrombectomy.  On imaging, she was found to have an acute ischemic lacunar infarct of the right thalamus.  Hospital course was complicated by ataxia on left, mild hyperglycemia. She was admitted in acute inpatient rehabilitation on 12/23/2020.     PLAN:  I. Rehabilitation - PT, OT, Rehab Nursing    II. Medical management:  # Neuro - s/p acute ischemic lacunar infarct of the right thalamus.  - Not a candidate for tPA or thrombectomy. - Permissive HTN with gentle return to normotensive.   - Secondary stroke prevention - treat HTN, HLD, DM   - Lipitor 40mg daily, ASA 81mg daily, Plavix 75 mg daily x21 days (EOT 1/10/2021)   - Antiplatelet/Anticoagulation therapy: ASA and Plavix (see DVT section below). - Long-term, patient will also need lifestyle modifications as well including appropriate diet and exercise, and refraining from tobacco use. Will educate on lifestyle modifications throughout rehab stay. - Spasticity: none present   - PRECAUTION: FALLS  - Post-discharge, follow with Neuro in stroke clinic and PM&R for stroke rehabilitation.       # Ortho - left hemiparesis  - WBAT   - Will monitor for shoulder pain/subluxation - no acute issues  - L ankle pain, XR done on 12/23, ortho consult done. Likely due to spasticity and hemiparesis. Can consider AFO while in rehab.     # CVS - recent CVA  - Secondary stroke prevention - treat HTN, HLD, DM                 # Pulm - at risk for atelectasis  - Continue good pulm hygiene. - Continue Zyrtec 10 mg nightly for allergies.      # GI/bowel/FEN -   - Diet: general cardiac, thin liquids. - Bowel program: Colace 100 BID, senna 2 tabs at lunch, MoM PRN  - GI prophylaxis: none  - Given high prevalence of Vitamin D deficiency in [place]: supplement with ergocalciferol 50K units/week x8 weeks. - MVI daily. # Pain control - Acetaminophen 650 mg q6H PRN for pain, oxycodone 2.5-5mg tabs q4PRN for moderate-severe pain  - Continue home meds gabapentin 300 mg TID, zanaflex 4 mg TID PRN. - Relafen and Lodine on hold for now due to history of stroke.     # DVT prophylaxis - SCDs and chemoprophylaxis with Lovenox 40 mg daily.      # Skin - at risk for DTI  - maintain skin integrity.     - turns q2H.     # Renal/urinary -   - Renal: No evidence of renal failure on last BMP on 12/20/2020.  - Bladder: Voiding volitionally.     # Heme - no acute issues  - Hgb stable at 14.1 on 12/20/2020. # Endocrine - hypothyroidism  - Continue Synthroid 50 mcg daily     # ID - no acute issues     # Psych - Melatonin 3 mg QHS for insomnia  - Can consider rehab psychology if issues with coping/adjustment arise. # Code status: FULL CODE     # Disposition/social - likely discharge home, will discuss in weekly interdisciplinary team rounds. # Rehab Discharge Goals: Modified independent with mobility and ADLs. # IOPC: Please see individualized plan of care from progress note dated 12/28/2020. Ashok Ramires DO  Board Certified Physical Medicine and Rehabilitation     Please note that the above documentation was prepared using voice recognition software. Every attempt was made to ensure accuracy but there may be spelling, grammatical, and contextual errors.

## 2020-12-29 PROCEDURE — 6370000000 HC RX 637 (ALT 250 FOR IP): Performed by: INTERNAL MEDICINE

## 2020-12-29 PROCEDURE — 97530 THERAPEUTIC ACTIVITIES: CPT

## 2020-12-29 PROCEDURE — 1280000000 HC REHAB R&B

## 2020-12-29 PROCEDURE — 97535 SELF CARE MNGMENT TRAINING: CPT

## 2020-12-29 PROCEDURE — 6370000000 HC RX 637 (ALT 250 FOR IP): Performed by: PHYSICAL MEDICINE & REHABILITATION

## 2020-12-29 PROCEDURE — 99233 SBSQ HOSP IP/OBS HIGH 50: CPT | Performed by: PHYSICAL MEDICINE & REHABILITATION

## 2020-12-29 RX ORDER — LANOLIN ALCOHOL/MO/W.PET/CERES
3 CREAM (GRAM) TOPICAL NIGHTLY PRN
Status: DISCONTINUED | OUTPATIENT
Start: 2020-12-29 | End: 2021-01-05 | Stop reason: HOSPADM

## 2020-12-29 RX ADMIN — TIZANIDINE 4 MG: 4 TABLET ORAL at 20:51

## 2020-12-29 RX ADMIN — TIZANIDINE 4 MG: 4 TABLET ORAL at 07:00

## 2020-12-29 RX ADMIN — ATORVASTATIN CALCIUM 40 MG: 40 TABLET, FILM COATED ORAL at 20:51

## 2020-12-29 RX ADMIN — GABAPENTIN 300 MG: 300 CAPSULE ORAL at 07:00

## 2020-12-29 RX ADMIN — Medication 1 TABLET: at 08:29

## 2020-12-29 RX ADMIN — TIZANIDINE 4 MG: 4 TABLET ORAL at 14:56

## 2020-12-29 RX ADMIN — ASPIRIN 81 MG: 81 TABLET, COATED ORAL at 08:29

## 2020-12-29 RX ADMIN — CETIRIZINE HYDROCHLORIDE 10 MG: 10 TABLET, FILM COATED ORAL at 20:51

## 2020-12-29 RX ADMIN — CLOPIDOGREL 75 MG: 75 TABLET, FILM COATED ORAL at 08:29

## 2020-12-29 RX ADMIN — LEVOTHYROXINE SODIUM 50 MCG: 0.05 TABLET ORAL at 07:00

## 2020-12-29 RX ADMIN — GABAPENTIN 300 MG: 300 CAPSULE ORAL at 20:51

## 2020-12-29 RX ADMIN — GABAPENTIN 300 MG: 300 CAPSULE ORAL at 14:56

## 2020-12-29 ASSESSMENT — PAIN SCALES - GENERAL: PAINLEVEL_OUTOF10: 0

## 2020-12-29 NOTE — PATIENT CARE CONFERENCE
[address] [place], [place] [address]  INPATIENT ACUTE REHABILITATION  TEAM CONFERENCE NOTE/PATIENT PLAN OF CARE    Date: 2020  Admission date: 2020  Patient Name: Cary Estrada        MRN: [id number]    : 1955  (66 y.o.)  Gender: female   Rehab diagnosis/surgery with date:  CVA  Impairment Group Code:  1.1      MEDICAL/FUNCTIONAL HISTORY/STATUS:  left side weakness,  medically stable, hx of hypertension, ataxia    Consultations/Labs/X-rays: none recent      MEDICATION UPDATE:  see MAR      NURSING :    Bowel:   Current level: meds, continent    Bladder:   Current level: continent    Toilet Hygiene:   Current level : min assist  Short term Toilet hygiene goal: Contact guard assist  Long term toilet hygiene goal:  supervision    Skin integrity: left ankle abrasion  Pain: none    NUTRITION    Diet  cardiac , appetite varies  Liquid consistency   thin    SOCIAL INFORMATION:  Lives with: nephew  Prior community services:  none  Home Architecture:  1 floor apt, 4 entry  Prior Level of function:  independent, on SSD  DME:  none    FAMILY / PATIENT EDUCATION:  safety and self care are ongoing    PHYSICAL THERAPY    Bed mobility:   Current level: Modified independent  Short term bed mobility goal: Modified independent  Long term bed mobility goal: Modified independent    Chair/bed transfers:  Current level: standby assist-Contact guard assist with wheeled walker, sensory ataxia  Short term Chair/bed transfers goal: standby assist  Long term Chair/bed transfers goal: Modified independent      Ambulation:   Current level: 300 ft wheeled walker at Contact guard assist,ataxia, stability issues with left ankle, causing LOB, would benefit from flexible AFO  Short term ambulation goal: met  Long term ambulation goal: 400 ft at Modified independent    Car transfers:   Current level: Contact guard assist  Short term car transfers goal: standby assist  Long term car transfers goal:Modified independent    Stairs:   Current level : 8 with 1 rail at min assist  Short term stairs goal: 12 at standby assist  Long term stairs goal: 12 at Modified independent    Lower Extremity Strength Issues:    RLE grossly 5/5  L hip flexion 3+/5  L knee extension 4-/5  L ankle DF 4-/5 L ankle evertors 3+/5  L ankle invertors 4/5  Other comments: ataxia hinders      OCCUPATIONAL THERAPY:      Tub/shower:   Current level: min assist with extended tub bench  Short term tub/shower goal:  Contact guard assist  Long term tub/shower goal: standby assist      Feeding:  Current level: supervision-Modified independent  Short term feeding goal: Modified independent  Long term feeding goal: Modified independent    Grooming:   Current level: Modified independent  Short term grooming goal: Modified independent  Long term grooming goal: Modified independent    Bathing:  Current level: supervision  Short term bathing goal: supervision  Long term bathing goal: supervision    Homemaking:   Current level: min assist with wheeled walker  Short term homemaking goal: Modified independent  Long term homemaking goal: Modified independent    Upper body dressing:  Current level: supervision  Short term upper body dressing goal: Modified independent  Long term upper body dressing goal: Modified independent    Lower body dressing:  Current level: Contact guard assist-standby assist  Short term lower body dressing goal: standby assist  Long term lower body dressing goal: Modified independent    Toilet transfer:   Current level: Contact guard assist-standby assist  Short term toilet transfer goal: standby assist  Long term toilet transfer goal: Modified independent    SPEECH THERAPY  Swallowing:  Current level:  independent  Short term swallowing goal: independent  Long term swallowing Goal: independent    Comprehension:   Current level: Modified independent  Short term comprehension goal: Modified independent  Long term comprehension goal: Modified independent    Expression:   Current level: Modified independent  Short term expression goal: Modified independent  Long term expression goal: Modified independent    Problem solving:   Current level: Modified independent  Short term problem solving goal: Modified independent  Long term problem solving goal: Modified independent    Memory:  Current level: Modified independent  Short term memory goal: Modified independent  Long term memory goal: Modified independent      Safety awareness: supervision, flat affect      Patient/family's personal goals: \"get home for [employer]"  Factors supporting goal achievement:  motivated  Factors hindering goal achievement:  left ankel instability      Discharge Plan   Estimated Length of Stay: 1/7/20            Destination: home  Services at Discharge: outpatient therapies  Equipment at Discharge: to be assessed      INTERDISCIPLINARY TEAM/PHYSICIAN RECOMMENDATION AND/OR REVISIONS OF PLAN OF CARE:  get [facility] consult for left flexible AFO      I approve the established interdisciplinary plan of care as documented within the medical record of Christi. Electronically signed by Maude Oswald RN  on 12/29/2020 at 12:57 PM  The following interdisciplinary team members were present:  DEX Schuler, MSSA,LSW  Judd Isaacs.  Charisse Hood, DPT  Zara Flores, OTR  Pepper Gallagher, SLP

## 2020-12-29 NOTE — PROGRESS NOTES
12/20/2020    MCV 93.6 12/20/2020     12/20/2020     Lab Results   Component Value Date     12/20/2020    K 3.7 12/20/2020     12/20/2020    CO2 25 12/20/2020    BUN 17 12/20/2020    CREATININE 0.9 12/20/2020    GLUCOSE 77 12/20/2020    CALCIUM 9.0 12/20/2020    PROT 7.0 12/19/2020    LABALBU 4.3 12/19/2020    BILITOT 0.5 12/19/2020    ALKPHOS 66 12/19/2020    AST 24 12/19/2020    ALT 12 12/19/2020    LABGLOM >60 12/20/2020    GFRAA >60 12/20/2020         Lab Results   Component Value Date    ALT 12 12/19/2020    AST 24 12/19/2020    ALKPHOS 66 12/19/2020    BILITOT 0.5 12/19/2020       Functional Status  Mobility:   General Mobility: SBA-minimal assistance  Gait:  100 feet with [employer] with Min/Mod A  Transfers: Minimal-moderate assistance  Bed Mobility: SBA    ADL's:    Feeding: Set up  Grooming: Minimal assistance  UB dressing: Supervision  LB dressing: Minimal assistance  UB bathing: Minimal assistance  LB bathing: Minimal assistance  Commode transfer: Minimal assistance  Functional mobility: Minimal assistance  Misc: Homemaking is TBA    Cognition: Baseline    IMPRESSION:  Cary Estrada is a 72 y.o. right-handed female with PMH significant for arthritis, anxiety, numbness/tingling of both legs, cancer, headaches; who presented to [facility] on 12/19/2020 with roughly 2 days of left-sided weakness.  The patient was not a candidate for TPA and/or thrombectomy.  On imaging, she was found to have an acute ischemic lacunar infarct of the right thalamus.  Hospital course was complicated by ataxia on left, mild hyperglycemia. She was admitted in acute inpatient rehabilitation on 12/23/2020. PLAN:  I. Rehabilitation - PT, OT, Rehab Nursing    II. Medical management:  # Neuro - s/p acute ischemic lacunar infarct of the right thalamus.  - Not a candidate for tPA or thrombectomy.   - Permissive HTN with gentle return to normotensive.   - Secondary stroke prevention - treat HTN, HLD, DM   - Lipitor 40mg daily, ASA 81mg daily, Plavix 75 mg daily x21 days (EOT 1/10/2021)   - Antiplatelet/Anticoagulation therapy: ASA and Plavix (see DVT section below). - Long-term, patient will also need lifestyle modifications as well including appropriate diet and exercise, and refraining from tobacco use. Will educate on lifestyle modifications throughout rehab stay. - Spasticity: none present   - PRECAUTION: FALLS  - Post-discharge, follow with Neuro in stroke clinic and PM&R for stroke rehabilitation.    - Consult to orthotics today for L AFO.      # Ortho - left hemiparesis  - WBAT   - Will monitor for shoulder pain/subluxation - no acute issues  - L ankle pain, XR done on 12/23, ortho consult done. Likely due to spasticity and hemiparesis. AFO fitting and training to begin.      # CVS - recent CVA  - Secondary stroke prevention - treat HTN, HLD, DM                 # Pulm - at risk for atelectasis  - Continue good pulm hygiene. - Continue Zyrtec 10 mg nightly for allergies.      # GI/bowel/FEN -   - Diet: general cardiac, thin liquids. - Bowel program: Colace 100 BID, senna 2 tabs at lunch, MoM PRN  - GI prophylaxis: none  - Given high prevalence of Vitamin D deficiency in [place]: supplement with ergocalciferol 50K units/week x8 weeks. - MVI daily. # Pain control - Acetaminophen 650 mg q6H PRN for pain, oxycodone 2.5-5mg tabs q4PRN for moderate-severe pain  - Continue home meds gabapentin 300 mg TID, zanaflex 4 mg TID PRN. - Relafen and Lodine on hold for now due to history of stroke.     # DVT prophylaxis - SCDs and frequent daily ambulation.       # Skin - at risk for DTI  - maintain skin integrity. - turns q2H.     # Renal/urinary -   - Renal: No evidence of renal failure on last BMP on 12/20/2020.  - Bladder: Voiding volitionally.     # Heme - no acute issues  - Hgb stable at 14.1 on 12/20/2020.     # Endocrine - hypothyroidism  - Continue Synthroid 50 mcg daily     # ID - no acute issues     # Psych - Melatonin 3 mg QHS PRN for insomnia  - Can consider rehab psychology if issues with coping/adjustment arise. # Code status: FULL CODE     # Disposition/social - likely discharge home, will continue to discuss in weekly interdisciplinary team rounds, most recent weekly meeting on 12/29/20 with a TDD 1/7/21. # Rehab Discharge Goals: Modified independent with mobility and ADLs. # IOPC: Please see individualized plan of care from progress note dated 12/28/2020. Ashok Ramires DO  Board Certified Physical Medicine and Rehabilitation     Please note that the above documentation was prepared using voice recognition software. Every attempt was made to ensure accuracy but there may be spelling, grammatical, and contextual errors.

## 2020-12-29 NOTE — CARE COORDINATION
Per team meeting:  D/c 1/7. Updated patient and son Travis Ramos. Patient really wanted to d/c ASAP [facility] 12/31 however team feels she would benefit from remaining on the unit. Patient has sensory issues and ataxia. L AFO will be ordered. Goals range from Sup in bathing to mod I / I.    DME/Aftercare - TBE. FT - son checking to see if he can take a day off work . Sisters do not drive.     Ethel Ferraro

## 2020-12-29 NOTE — PLAN OF CARE
Problem: Skin Integrity:  Goal: Will show no infection signs and symptoms  Description: Will show no infection signs and symptoms  Outcome: Met This Shift     Problem: Skin Integrity:  Goal: Absence of new skin breakdown  Description: Absence of new skin breakdown  Outcome: Met This Shift     Problem: Falls - Risk of:  Goal: Will remain free from falls  Description: Will remain free from falls  Outcome: Met This Shift     Problem: Falls - Risk of:  Goal: Absence of physical injury  Description: Absence of physical injury  Outcome: Met This Shift     Problem: Safety:  Goal: Free from accidental physical injury  Description: Free from accidental physical injury  Outcome: Met This Shift

## 2020-12-29 NOTE — PROGRESS NOTES
Occupational Therapy  OCCUPATIONAL THERAPY DAILY NOTE    Date:2020  Patient Name: Delfino Zurita  MRN: [id number]  : 1955  Room: [facility]     Referring Practitioner: Leonor Carnes MD  Diagnosis: CVA  Additional Pertinent Hx: arthritis, anxiety, hx cancer  Restrictions: Fall Risk, LUE/LLE proprioceptive deficits, L ankle instabilty- improves with shoes on,     Functional Assessment:   Date Status AE  Comments   Feeding 20  Mod I   Pt able to open packages on tray    Grooming 20  Mod I   Pt brushed hair seated at sink          Oral Care 20  Set up  seated Pt cleaned and applied dentures    Bathing 20  SBA  Seated & standing Pt declined shower. Sponge bath completed. UB Dressing 20  Set up   Donned pull over shirt    LB Dressing 20  SBA   Pt donned underpants and pants seated in w/c. SBA to stand and pull up pants    Footwear 20  Supervision   Pt donned socks and velcro strapped shoes    Toileting 20  CGA   Steady assist when standing to pull up pants    Homemaking 20  Min A  ww      Functional Transfers / Balance:   Date Status DME  Comments   Sit Balance 20  Mod I       Stand Balance 20  SBA  ww Standing to pull up pants    [x] Tub      [x] Shower   Transfer 20  Min A     Min A  Ww, extended tub bench     Commode   Transfer 20  CGA/SBA  rw    Functional   Mobility 20  CGA /SBA  rw Back and forth from room to OT clinic. Pt needed occasional CGA for balance when ambulating in a crowded area.  Verbal cues to attend to left side    Other:  On/off recliner, couch and standard bed    20    Minimal Assist    ww      Functional Exercises / Activity:  Left hand fine motor coordination activity with pt completing black and white  board activity   Left hand strength with 5 # digi flex 3 sets 10 reps     Sensory / Neuromuscular Re-Education:      Cognitive Skills:   Status Comments   Problem   Solving fair

## 2020-12-29 NOTE — PROGRESS NOTES
Physical Therapy    Facility/Department: [address] REHAB  Treatment Note    NAME: Rui Connelly  : 1955  MRN: [id number]    Date of Service: 2020  Evaluating Therapist: Rahel Munoz PT, DPT    ROOM: [facility]  DIAGNOSIS: CVA  PRECAUTIONS: falls, L hemiparesis, significant L extremity proprioceptive deficits leading to sensory ataxia  HPI: Pt with past medical history significant for arthritis, anxiety, numbness/tingling of both legs, cancer, headaches; who presented to [facility] on 2020 with roughly 2 days of left-sided weakness.  The patient was not a candidate for TPA and/or thrombectomy. On imaging, she was found to have an acute ischemic lacunar infarct of the right thalamus. Social:  Pt lives with nephew in a 1 floor plan 1 step without rails + 4 steps with R HR to enter. Alternate entrance available with 4 GETACHEW and no HR. Her nephew works night shift and sleeps during the day. Pt's son and daughter live nearby and are willing to assist as needed. Prior to admission: Pt ambulated without AD, was functionally independent. 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 yes Yes  yes     Does pt have pain?  No c/o pain No c/o pain No c/o pain     Bed Mobility  Rolling: SBA  Supine to sit: SBA  Sit to supine: SBA  Scooting: SBA NT NT Supervision Mod I   Transfers Sit to stand: ModA  Stand to sit: Flor Orourke  Stand pivot: ModA with [facility] Sit to stand: SBA  Stand to sit: CGA  Stand pivot: CGA with [facility] Sit to stand: SBA  Stand to sit: CGA  Stand pivot: CGA with [facility] SBA Mod I   Ambulation    100 feet with [facility] with Debbie/ModA 350 feet x 2 reps with [facility] with CGA, 1 LOB with ModA to correct (L posterior leaf spring AFO on second attempt, see comments) 300 feet x 2 reps with [facility] with CGA/SBA (L trial toe off AFO) 200 feet with AAD with  feet with AAD with Mod I   Walking 10 feet on uneven surface 10 feet with [employer] with ModA NT NT 10 feet with AAD with SBA 10 feet with AAD with Mod I   Wheel Chair Mobility NT NA NA     Car Transfers Debbie with [employer] NT NT SBA Mod I   Stair negotiation: ascended and descended  8 steps with 2 rails with Debbie/ModA 12 steps with 2 rails with CGA  12 steps with 1 HR with Debbie 12 steps with R HR with CGA (lateral technique) 12 steps with 1 rail with SBA 12 steps with 1 rail with Mod I   Curb Step:   ascended and descended 2 inch step with [employer] and Debbie/ModA NT NT 7.5 inch step with AAD and SBA 7.5 inch step with AAD and Mod I   Picking up object off the floor Picked up cone with Min assist NT NT Will  2lb weight with SB assist Will  4lb weight with Mod I   BLE ROM WNL WNL      BLE Strength RLE grossly 5/5  L hip flexion 3+/5  L knee extension 4-/5  L ankle DF 4-/5 L ankle evertors 2/5  L ankle invertors 4/5 RLE grossly 5/5  L hip flexion 3+/5  L knee extension 4-/5  L ankle DF 4-/5 L ankle evertors 3+/5  L ankle invertors 4/5      Balance  Static and dynamic standing balance Debbie/ModA with [employer] Static and dynamic standing balance CGA with [employer]      Date Family Teach Completed        Is additional Family Teaching Needed?   Y or N        Hindering Progress Balance, L extremity hemiparesis/coordination deficits Balance, L extremity hemiparesis/coordination deficits      PT recommended ELOS 4 weeks 2 weeks      Team's Discharge Plan        Therapist at Team Meeting          Therapeutic Exercise:   AM: Ambulation with R SPC (L posterior leaf spring AFO) 150 feet x 1 rep with Debbie  FW NR agility ladder negotiation with R SPC (L posterior leaf spring AFO) x 2 reps with CGA/Debbie  PM: NR stepping over 3 SPC with [employer] x 2 reps with SBA  Side stepping length of parallel bars without UE support 2 x 2 reps L and R with CGA/SBA    Patient education  Pt educated on safe hand placement with transfers    Patient response to education:   Pt verbalized understanding Pt demonstrated skill Pt requires further education in this area   yes partial yes     Additional Comments: Pt continues to exhibit significantly ataxic gait resulting from sensory deficits with inconsistent L step length, decreased L knee flexion during swing phase and inconsistent L knee flexion/hyperextension during stance. L ankle exhibits mediolateral instability resulting in one instance inversion during stance with resultant LOB. Pt denies pain/feeling of injury. Pt would benefit from AFO to address mediolateral stability of L ankle. Posterior leaf spring AFO trialed at this moment however pt will likely requires more custom and more encompassing AFO. AM  Time in: 1045  Time out: 1130    PM  Time in: 1345  Time out: 1430    Pt is making good progress toward established Physical Therapy goals. Continue with physical therapy current plan of care.     Lorena Min, PT, DPT  [phone]

## 2020-12-30 PROBLEM — E44.0 MODERATE PROTEIN-CALORIE MALNUTRITION (HCC): Status: ACTIVE | Noted: 2020-12-30

## 2020-12-30 PROCEDURE — 6370000000 HC RX 637 (ALT 250 FOR IP): Performed by: PHYSICAL MEDICINE & REHABILITATION

## 2020-12-30 PROCEDURE — 6370000000 HC RX 637 (ALT 250 FOR IP): Performed by: INTERNAL MEDICINE

## 2020-12-30 PROCEDURE — 1280000000 HC REHAB R&B

## 2020-12-30 PROCEDURE — 97112 NEUROMUSCULAR REEDUCATION: CPT

## 2020-12-30 PROCEDURE — 99232 SBSQ HOSP IP/OBS MODERATE 35: CPT | Performed by: PHYSICAL MEDICINE & REHABILITATION

## 2020-12-30 PROCEDURE — 97530 THERAPEUTIC ACTIVITIES: CPT

## 2020-12-30 PROCEDURE — L2330 LACER MOLDED TO PATIENT MODE: HCPCS

## 2020-12-30 PROCEDURE — L1940 AFO MOLDED TO PATIENT PLASTI: HCPCS

## 2020-12-30 RX ADMIN — ATORVASTATIN CALCIUM 40 MG: 40 TABLET, FILM COATED ORAL at 21:28

## 2020-12-30 RX ADMIN — Medication 1 TABLET: at 08:06

## 2020-12-30 RX ADMIN — OXYCODONE HYDROCHLORIDE 5 MG: 5 TABLET ORAL at 15:41

## 2020-12-30 RX ADMIN — SENNOSIDES 17.2 MG: 8.6 TABLET, FILM COATED ORAL at 21:28

## 2020-12-30 RX ADMIN — TIZANIDINE 4 MG: 4 TABLET ORAL at 13:44

## 2020-12-30 RX ADMIN — CLOPIDOGREL 75 MG: 75 TABLET, FILM COATED ORAL at 08:06

## 2020-12-30 RX ADMIN — CETIRIZINE HYDROCHLORIDE 10 MG: 10 TABLET, FILM COATED ORAL at 21:28

## 2020-12-30 RX ADMIN — GABAPENTIN 300 MG: 300 CAPSULE ORAL at 21:29

## 2020-12-30 RX ADMIN — GABAPENTIN 300 MG: 300 CAPSULE ORAL at 06:52

## 2020-12-30 RX ADMIN — GABAPENTIN 300 MG: 300 CAPSULE ORAL at 13:44

## 2020-12-30 RX ADMIN — ASPIRIN 81 MG: 81 TABLET, COATED ORAL at 08:06

## 2020-12-30 RX ADMIN — TIZANIDINE 4 MG: 4 TABLET ORAL at 21:29

## 2020-12-30 RX ADMIN — DOCUSATE SODIUM 100 MG: 100 CAPSULE, LIQUID FILLED ORAL at 21:28

## 2020-12-30 RX ADMIN — TIZANIDINE 4 MG: 4 TABLET ORAL at 06:52

## 2020-12-30 RX ADMIN — LEVOTHYROXINE SODIUM 50 MCG: 0.05 TABLET ORAL at 06:52

## 2020-12-30 ASSESSMENT — PAIN DESCRIPTION - LOCATION: LOCATION: ARM

## 2020-12-30 ASSESSMENT — PAIN DESCRIPTION - FREQUENCY: FREQUENCY: INTERMITTENT

## 2020-12-30 ASSESSMENT — PAIN SCALES - GENERAL
PAINLEVEL_OUTOF10: 0
PAINLEVEL_OUTOF10: 0
PAINLEVEL_OUTOF10: 3
PAINLEVEL_OUTOF10: 10
PAINLEVEL_OUTOF10: 0

## 2020-12-30 ASSESSMENT — PAIN DESCRIPTION - PAIN TYPE: TYPE: ACUTE PAIN

## 2020-12-30 ASSESSMENT — PAIN DESCRIPTION - DESCRIPTORS: DESCRIPTORS: ACHING;DISCOMFORT

## 2020-12-30 NOTE — PLAN OF CARE
Problem: Skin Integrity:  Goal: Will show no infection signs and symptoms  Description: Will show no infection signs and symptoms  12/30/2020 1256 by Nai Crumb  Outcome: Met This Shift  12/30/2020 0826 by Ruby Lugo RN  Outcome: Met This Shift  Goal: Absence of new skin breakdown  Description: Absence of new skin breakdown  12/30/2020 1256 by Nai Crumb  Outcome: Met This Shift  12/30/2020 0826 by Ruby Lugo RN  Outcome: Met This Shift     Problem: Falls - Risk of:  Goal: Will remain free from falls  Description: Will remain free from falls  12/30/2020 1256 by Nai Crgilbert  Outcome: Met This Shift  12/30/2020 0826 by Ruby Lugo RN  Outcome: Met This Shift  Goal: Absence of physical injury  Description: Absence of physical injury  12/30/2020 1256 by Nai Crumb  Outcome: Met This Shift  12/30/2020 0826 by Ruby Lugo RN  Outcome: Met This Shift     Problem: Neurological  Goal: Maximum potential motor/sensory/cognitive function  Outcome: Met This Shift     Problem: Safety:  Goal: Free from accidental physical injury  Description: Free from accidental physical injury  Outcome: Met This Shift  Goal: Free from intentional harm  Description: Free from intentional harm  Outcome: Met This Shift     Problem: Daily Care:  Goal: Daily care needs are met  Description: Daily care needs are met  Outcome: Met This Shift     Problem: Pain:  Goal: Patient's pain/discomfort is manageable  Description: Patient's pain/discomfort is manageable  Outcome: Met This Shift     Problem: Skin Integrity:  Goal: Skin integrity will stabilize  Description: Skin integrity will stabilize  Outcome: Met This Shift     Problem: Malnutrition  (NI-5.2)  Goal: Food and/or Nutrient Delivery  Description: Individualized approach for food/nutrient provision.   12/30/2020 1243 by Clif Krishna RD, LD  Outcome: Met This Shift

## 2020-12-30 NOTE — PROGRESS NOTES
Occupational Therapy  OCCUPATIONAL THERAPY DAILY NOTE    Date:2020  Patient Name: Darion Connell  MRN: [id number]  : 1955  Room: -A     Referring Practitioner: Nolvia Julian MD  Diagnosis: CVA  Additional Pertinent Hx: arthritis, anxiety, hx cancer  Restrictions: Fall Risk, LUE/LLE proprioceptive deficits, L ankle instabilty- improves with shoes on,     Functional Assessment:   Date Status AE  Comments   Feeding 20  Mod I      Grooming 20  Mod I            Oral Care 20  Set up  seated    Bathing 20  SBA  Seated & standing    UB Dressing 20  Set up      LB Dressing 20  SBA      Footwear 20  Supervision   Pt donned socks,shoes and L AFO brace seated edge of bed    Toileting 20  CGA      Homemaking 20  Min A (meal prep)  SBA (laundry) ww   Pt stood at table top level folding laundry      Functional Transfers / Balance:   Date Status DME  Comments   Sit Balance 20  Mod I       Stand Balance 20  SBA  ww    [x] Tub      [x] Shower   Transfer 20  Min A       Min A  Ww, extended tub bench  Verbal cues for hand placement    Commode   Transfer 20  CGA/SBA  rw    Functional   Mobility 20  CGA /SBA  rw  throughout therapy apt setting over tile and carpet floor surfaces    Other:  On/off recliner, couch  standard bed  And kitchen chair without arm rests    20    CGA    ww   Verbal cues for hand placement. Pt attempting to pull up on ww     Functional Exercises / Activity:  LUE strength with 2 # weighted ball 3 sets 10 reps in all planes   L hand gross motor coordination with pt completing MRMT board with improved tip to tip prehension patterns noted   Standing balance/endurance activity at table top level folding laundry at SBA. Pt stood 5 mins before needing seated rest break   L  Hand strength with 5 # digi flex 3 sets 10 reps     Sensory / Neuromuscular Re-Education:      Cognitive Skills:   Status Comments   Problem   Solving fair  During ADL   Memory good  \"   Sequencing good  \"   Safety fair  Impaired due to proprioceptive deficits     Visual Perception:    Education:  Pt was educated on pacing and energy conservation techniques to utilize during ADL's and functional activities     [] Family teach completed on:    Pain Level: Pt did not c/o pain during OT session    Additional Notes:   12/29/20 Dynamometer : R 40#'s , L 20#'s  Pt continues to be unable to completed 9 hole peg test with left hand                                                                          R hand 9 hole peg test 32 secs     Patient has made good  progress during treatment sessions toward set goals. Therapy emphasis to obtain goals:ADL retraining, transfers training, endurance/balance retraining, therex, homemaking & pt/family education       [x] Continue with current OT Plan of care.   [] Prepare for Discharge     DISCHARGE RECOMMENDATIONS  Recommended DME:    Post Discharge Care:   []Home Independently  []Home with 24hr Care / Supervision [x]Home with Partial Supervision []Home with Home Health OT []Home with Out Pt OT []Other: ___   Comments:         Time in Time out Tx Time Breakdown  Variance:   First Session  [id number] [x] Individual Tx-45  [] Concurrent Tx -  [] Co-Tx -   [] Group Tx -   [] Time Missed -     Second Session (03) [phone] [x] Individual Tx-45  [] Concurrent Tx -    [] Co-Tx -   [] Group Tx -   [] Time Missed -     Third Session    [] Individual Tx-   [] Concurrent Tx -  [] Co-Tx -   [] Group Tx -   [] Time Missed -         Total Tx Time: 90 mins        Joel German OTR/L [id number]

## 2020-12-30 NOTE — PROGRESS NOTES
Physical Therapy    Facility/Department: [address] REHAB  Treatment Note    NAME: Dana Anthony  : 1955  MRN: [id number]    Date of Service: 2020  Evaluating Therapist: Lanette Whitfield PT, DPT    ROOM: [facility]  DIAGNOSIS: CVA  PRECAUTIONS: falls, L hemiparesis, significant L extremity proprioceptive deficits leading to sensory ataxia, L AFO  HPI: Pt with past medical history significant for arthritis, anxiety, numbness/tingling of both legs, cancer, headaches; who presented to [facility] on 2020 with roughly 2 days of left-sided weakness.  The patient was not a candidate for TPA and/or thrombectomy. On imaging, she was found to have an acute ischemic lacunar infarct of the right thalamus. Social:  Pt lives with nephew in a 1 floor plan 1 step without rails + 4 steps with R HR to enter. Alternate entrance available with 4 GETACHEW and no HR. Her nephew works night shift and sleeps during the day. Pt's son and daughter live nearby and are willing to assist as needed. Prior to admission: Pt ambulated without AD, was functionally independent. 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 yes Yes  yes     Does pt have pain?  No c/o pain No c/o pain No c/o pain     Bed Mobility  Rolling: SBA  Supine to sit: SBA  Sit to supine: SBA  Scooting: SBA NT NT Supervision Mod I   Transfers Sit to stand: ModA  Stand to sit: [address] [facility] [place]  Stand pivot: ModA with [employer] Sit to stand: SBA  Stand to sit: SBA  Stand pivot: CGA with [employer], SBA with standard walker Sit to stand: SBA  Stand to sit: SBA  Stand pivot: SBA with standard walker SBA Mod I   Ambulation    100 feet with [employer] with Debbie/ModA 350 feet x 1 rep with [employer] with CGA, 1 LOB with ModA to correct    150 feet x 2 reps with standard walker with CGA/SBA   (L trial toe off AFO) 150 feet x 2 reps with standard walker SBA (L custom flexible AFO) 200 feet with AAD with  feet with AAD with Mod I   Walking 10 feet on uneven surface 10 feet with [employer] with ModA NT NT 10 feet with AAD with SBA 10 feet with AAD with Mod I   Wheel Chair Mobility NT NA NA     Car Transfers Debbie with [employer] SBA with standard walker NT SBA Mod I   Stair negotiation: ascended and descended  8 steps with 2 rails with Debbie/ModA 12 steps with 1 HR with CGA/SBA (lateral technique) NT 12 steps with 1 rail with SBA 12 steps with 1 rail with Mod I   Curb Step:   ascended and descended 2 inch step with [employer] and Debbie/ModA NT 7.5 inch curb step x 2 reps with standard walker with SBA 7.5 inch step with AAD and SBA 7.5 inch step with AAD and Mod I   Picking up object off the floor Picked up cone with Min assist NT NT Will  2lb weight with SB assist Will  4lb weight with Mod I   BLE ROM WNL WNL      BLE Strength RLE grossly 5/5  L hip flexion 3+/5  L knee extension 4-/5  L ankle DF 4-/5 L ankle evertors 2/5  L ankle invertors 4/5 RLE grossly 5/5  L hip flexion 4-/5  L knee extension 4-/5  L ankle DF 4-/5 L ankle evertors 4-/5  L ankle invertors 4/5      Balance  Static and dynamic standing balance Debbie/ModA with [employer] Static and dynamic standing balance CGA/SBA with standard walker      Date Family Teach Completed        Is additional Family Teaching Needed?   Y or N        Hindering Progress Balance, L extremity hemiparesis/coordination deficits Balance, L extremity hemiparesis/coordination deficits      PT recommended ELOS 4 weeks 2 weeks      Team's Discharge Plan        Therapist at Team Meeting          Therapeutic Exercise:   AM: weaving between 4 standing quad canes with standard walker x 1 rep with CGA  PM: FW NR agility ladder negotiation with R SPC x 2 reps with CGA/Debbie  FW/BW/lateral agility ladder negotiation with R SPC x 1 rep L and R with CGA/Debbie  FW/BW/lateral stepping around tile square with R SPC x 2 reps CW with CGA    Patient education  Pt educated on benefits of standard walker vs [employer]    Patient response to education:   Pt verbalized understanding Pt demonstrated skill Pt requires further education in this area   yes partial yes     Additional Comments: Mobility training transitioned to standard walker as pt continues to exhibit LOB with WW due to gait ataxia and inability to control FW momentum of [employer]. Frequent therapist assistance required with WW to prevent falls. Pt more stable with [employer] however gait remains significantly variable with variable step length/device advancement. Will continue mobility training with standard walker in order to reduce fall risk. Pt received personal flexible AFO which successfully maintained neutral alignment of ankle while ambulating. L knee stability improved as well with flexible AFO. AM  Time in: 1045  Time out: 1130    PM  Time in: 1345  Time out: 1430    Pt is making good progress toward established Physical Therapy goals. Continue with physical therapy current plan of care.     Rahel Munoz, PT, DPT  [phone]

## 2020-12-30 NOTE — PROGRESS NOTES
Comprehensive Nutrition Assessment    Type and Reason for Visit:  Initial, RD Nutrition Re-Screen/LOS    Nutrition Recommendations/Plan: Patient admitted meeting moderate malnutrition criteria however nutritional status improving since admit to ARU. Pt reports feeling \"great:\" & eating % of meals. Denied need for ONS at this time. Nutrition Assessment:  Pt w/ moderate malnutrition 2/2 noted wt loss & muscle/fat losses. Pt reports feeling \"great\" in therapy- PO intake % of most meals since admit w/ improving nutritional status. Denied need for ONS at this time    Malnutrition Assessment:  Malnutrition Status: Moderate malnutrition    Context:  Acute Illness     Findings of the 6 clinical characteristics of malnutrition:  Energy Intake:  No significant decrease in energy intake  Weight Loss:  1 - 5% over 1 month     Body Fat Loss:  1 - Mild body fat loss Orbital, Triceps   Muscle Mass Loss:  1 - Mild muscle mass loss Clavicles (pectoralis & deltoids), Temples (temporalis)  Fluid Accumulation:  No significant fluid accumulation     Strength:  Not Performed    Estimated Daily Nutrient Needs:  Energy (kcal):  9616-0594; Weight Used for Energy Requirements:  Current     Protein (g):  1.5-1.8= 75-85; Weight Used for Protein Requirements:  Current        Fluid (ml/day):  1400ml; Method Used for Fluid Requirements:  1 ml/kcal      Nutrition Related Findings:  +i/os, abd WNL, no edmea noted      Wounds:  None       Current Nutrition Therapies:    DIET CARDIAC;     Anthropometric Measures:  · Height: 5' 6\" (167.6 cm)  · Current Body Weight: 109 lb (49.4 kg)(12/30)   · Usual Body Weight: 115 lb (52.2 kg)(12/9/2020 per EMR)     · Ideal Body Weight: 130 lbs; % Ideal Body Weight 83.8 %   · BMI: 17.6  · BMI Categories: Underweight (BMI less than 22) age over 72       Nutrition Diagnosis:   · Moderate malnutrition, In context of acute illness or injury related to cognitive or neurological impairment(s/p CVA) as evidenced by mild loss of subcutaneous fat, mild muscle loss, weight loss greater than or equal to 5% in 1 month      Nutrition Interventions:   Food and/or Nutrient Delivery:  Continue Current Diet  Nutrition Education/Counseling:  No recommendation at this time   Coordination of Nutrition Care:  Continue to monitor while inpatient    Goals:  >75% of meals/ONS       Nutrition Monitoring and Evaluation:   Behavioral-Environmental Outcomes:  None Identified   Food/Nutrient Intake Outcomes:  Food and Nutrient Intake  Physical Signs/Symptoms Outcomes:  Biochemical Data, GI Status, Fluid Status or Edema, Nutrition Focused Physical Findings, Weight, Skin     Discharge Planning:     Too soon to determine     Electronically signed by Mireya Wren RD, LD on 12/30/20 at 12:41 PM EST    Contact: x [phone]

## 2020-12-30 NOTE — PROGRESS NOTES
PM&R Progress Note  Patient: Cary Estrada  Age/sex: 72 y.o. female  Medical Record #: [id number]  Location: 7836/4610-D  Admission date: 12/23/2020    CC: ARU follow up; CVA    S: Patient was seen and examined. No acute events overnight. No complaints this AM - patient continues to ask to go home. Has loaner L AFO which she states is helpful during gait. Denies chest pain, sob, abd pain. LBM 12/29, per patient - intermittently refusing bowel meds. No change in [facility] since H&P done on 12/23/2020.     Meds:  Scheduled      aspirin, 81 mg, Daily      atorvastatin, 40 mg, Nightly      cetirizine, 10 mg, Nightly      clopidogrel, 75 mg, Daily      gabapentin, 300 mg, Q8H      levothyroxine, 50 mcg, Daily      tiZANidine, 4 mg, Q8H      therapeutic multivitamin-minerals, 1 tablet, Daily      vitamin D, 50,000 Units, Weekly      docusate sodium, 100 mg, BID      senna, 2 tablet, Nightly      influenza virus vaccine, 0.5 mL, Prior to discharge        PRN      melatonin, 3 mg, Nightly PRN      acetaminophen, 650 mg, Q6H PRN      oxyCODONE, 2.5 mg, Q6H PRN    Or      oxyCODONE, 5 mg, Q6H PRN      ondansetron, 4 mg, Q8H PRN      calcium carbonate, 500 mg, TID PRN      magnesium hydroxide, 30 mL, Daily PRN      hydrALAZINE, 25 mg, Q8H PRN        O:  Vitals:    12/29/20 0416 12/29/20 0735 12/30/20 0710 12/30/20 0812   BP: 120/65 119/66  112/69   Pulse: 62 66  90   Resp: 16 18  16   Temp: 98.6 °F (37 °C) 98.4 °F (36.9 °C)  98.2 °F (36.8 °C)   TempSrc: Temporal Oral  Oral   SpO2: 95% 96%  97%   Weight:   109 lb 4 oz (49.6 kg)    Height:           GEN: NAD, conversational   HEENT: NC/AT, PERRLA, EOMI  RESP: CTAB, unlabored breathing  CV: RRR, no new murmur  ABD: soft, NT, ND, normal BS   : no black   EXT: Abnormal L-sided aROM, No clubbing/cyanosis, 2+ pulses   PSYCH: Normal mood and affect   NEURO: Alert, no new focal deficits     Labs:    Lab Results   Component Value Date    WBC 5.9 12/20/2020    HGB 14.1 12/20/2020    HCT 42.5 12/20/2020    MCV 93.6 12/20/2020     12/20/2020     Lab Results   Component Value Date     12/20/2020    K 3.7 12/20/2020     12/20/2020    CO2 25 12/20/2020    BUN 17 12/20/2020    CREATININE 0.9 12/20/2020    GLUCOSE 77 12/20/2020    CALCIUM 9.0 12/20/2020    PROT 7.0 12/19/2020    LABALBU 4.3 12/19/2020    BILITOT 0.5 12/19/2020    ALKPHOS 66 12/19/2020    AST 24 12/19/2020    ALT 12 12/19/2020    LABGLOM >60 12/20/2020    GFRAA >60 12/20/2020         Lab Results   Component Value Date    ALT 12 12/19/2020    AST 24 12/19/2020    ALKPHOS 66 12/19/2020    BILITOT 0.5 12/19/2020       Functional Status  Mobility:   General Mobility: SBA-minimal assistance  Gait:  100 feet with [employer] with Min/Mod A  Transfers: Minimal-moderate assistance  Bed Mobility: SBA    ADL's:    Feeding: Set up  Grooming: Minimal assistance  UB dressing: Supervision  LB dressing: Minimal assistance  UB bathing: Minimal assistance  LB bathing: Minimal assistance  Commode transfer: Minimal assistance  Functional mobility: Minimal assistance  Misc: Homemaking is TBA    Cognition: Baseline    IMPRESSION:  Bhavani Cervantes is a 72 y.o. right-handed female with PMH significant for arthritis, anxiety, numbness/tingling of both legs, cancer, headaches; who presented to [facility] on 12/19/2020 with roughly 2 days of left-sided weakness.  The patient was not a candidate for TPA and/or thrombectomy.  On imaging, she was found to have an acute ischemic lacunar infarct of the right thalamus.  Hospital course was complicated by ataxia on left, mild hyperglycemia. She was admitted in acute inpatient rehabilitation on 12/23/2020. PLAN:  I. Rehabilitation - PT, OT, Rehab Nursing    II. Medical management:  # Neuro - s/p acute ischemic lacunar infarct of the right thalamus.  - Not a candidate for tPA or thrombectomy.   - Permissive HTN with gentle return to normotensive.   - Secondary stroke prevention - treat HTN, HLD, DM   - Lipitor 40mg daily, ASA 81mg daily, Plavix 75 mg daily x21 days (EOT 1/10/2021)   - Antiplatelet/Anticoagulation therapy: ASA and Plavix (see DVT section below). - Long-term, patient will also need lifestyle modifications as well including appropriate diet and exercise, and refraining from tobacco use. Will educate on lifestyle modifications throughout rehab stay. - Spasticity: none present   - PRECAUTION: FALLS  - Post-discharge, follow with Neuro in stroke clinic and PM&R for stroke rehabilitation.    - Consult to orthotics for L AFO. Luis PALENCIAO is improving her ambulation.      # Ortho - left hemiparesis  - WBAT   - Will monitor for shoulder pain/subluxation - no acute issues  - L ankle pain, XR done on 12/23, ortho consult done. Likely due to spasticity and hemiparesis. AFO fitting and training to begin.      # CVS - recent CVA  - Secondary stroke prevention - treat HTN, HLD, DM                 # Pulm - at risk for atelectasis  - Continue good pulm hygiene. - Continue Zyrtec 10 mg nightly for allergies.      # GI/bowel/FEN -   - Diet: general cardiac, thin liquids. - Bowel program: Colace 100 BID, senna 2 tabs at lunch, MoM PRN  - GI prophylaxis: none  - Given high prevalence of Vitamin D deficiency in [place]: supplement with ergocalciferol 50K units/week x8 weeks. - MVI daily. # Pain control - Acetaminophen 650 mg q6H PRN for pain, oxycodone 2.5-5mg tabs q4PRN for moderate-severe pain  - Continue home meds gabapentin 300 mg TID, zanaflex 4 mg TID PRN. - Relafen and Lodine on hold for now due to history of stroke.     # DVT prophylaxis - SCDs and frequent daily ambulation.       # Skin - at risk for DTI  - maintain skin integrity. - turns q2H.     # Renal/urinary -   - Renal: No evidence of renal failure on last BMP on 12/20/2020.  - Bladder: Voiding volitionally.     # Heme - no acute issues  - Hgb stable at 14.1 on 12/20/2020.     # Endocrine - hypothyroidism  - Continue Synthroid 50 mcg daily     # ID - no acute issues     # Psych - Melatonin 3 mg QHS PRN for insomnia  - Can consider rehab psychology if issues with coping/adjustment arise. # Code status: FULL CODE     # Disposition/social - likely discharge home, will continue to discuss in weekly interdisciplinary team rounds, most recent weekly meeting on 12/29/20 with a TDD 1/7/21. # Rehab Discharge Goals: Modified independent with mobility and ADLs. # IOPC: Please see individualized plan of care from progress note dated 12/28/2020. Ashok Ramires DO  Board Certified Physical Medicine and Rehabilitation     Please note that the above documentation was prepared using voice recognition software. Every attempt was made to ensure accuracy but there may be spelling, grammatical, and contextual errors.

## 2020-12-31 PROCEDURE — 97530 THERAPEUTIC ACTIVITIES: CPT

## 2020-12-31 PROCEDURE — 6370000000 HC RX 637 (ALT 250 FOR IP): Performed by: PHYSICAL MEDICINE & REHABILITATION

## 2020-12-31 PROCEDURE — 97112 NEUROMUSCULAR REEDUCATION: CPT

## 2020-12-31 PROCEDURE — 1280000000 HC REHAB R&B

## 2020-12-31 PROCEDURE — 6370000000 HC RX 637 (ALT 250 FOR IP): Performed by: INTERNAL MEDICINE

## 2020-12-31 PROCEDURE — 97535 SELF CARE MNGMENT TRAINING: CPT

## 2020-12-31 RX ADMIN — CLOPIDOGREL 75 MG: 75 TABLET, FILM COATED ORAL at 07:42

## 2020-12-31 RX ADMIN — TIZANIDINE 4 MG: 4 TABLET ORAL at 21:24

## 2020-12-31 RX ADMIN — TIZANIDINE 4 MG: 4 TABLET ORAL at 06:41

## 2020-12-31 RX ADMIN — GABAPENTIN 300 MG: 300 CAPSULE ORAL at 21:25

## 2020-12-31 RX ADMIN — ATORVASTATIN CALCIUM 40 MG: 40 TABLET, FILM COATED ORAL at 20:56

## 2020-12-31 RX ADMIN — DOCUSATE SODIUM 100 MG: 100 CAPSULE, LIQUID FILLED ORAL at 20:56

## 2020-12-31 RX ADMIN — GABAPENTIN 300 MG: 300 CAPSULE ORAL at 06:41

## 2020-12-31 RX ADMIN — Medication 1 TABLET: at 07:42

## 2020-12-31 RX ADMIN — CETIRIZINE HYDROCHLORIDE 10 MG: 10 TABLET, FILM COATED ORAL at 20:56

## 2020-12-31 RX ADMIN — TIZANIDINE 4 MG: 4 TABLET ORAL at 14:08

## 2020-12-31 RX ADMIN — LEVOTHYROXINE SODIUM 50 MCG: 0.05 TABLET ORAL at 06:41

## 2020-12-31 RX ADMIN — ASPIRIN 81 MG: 81 TABLET, COATED ORAL at 07:42

## 2020-12-31 RX ADMIN — ERGOCALCIFEROL 50000 UNITS: 1.25 CAPSULE ORAL at 07:42

## 2020-12-31 RX ADMIN — SENNOSIDES 17.2 MG: 8.6 TABLET, FILM COATED ORAL at 20:56

## 2020-12-31 RX ADMIN — GABAPENTIN 300 MG: 300 CAPSULE ORAL at 14:08

## 2020-12-31 ASSESSMENT — PAIN SCALES - GENERAL: PAINLEVEL_OUTOF10: 0

## 2020-12-31 NOTE — PROGRESS NOTES
Notified Dr. Jovi Yan of pt.'s fall around 16:00 p.m. Pt. Was found sitting on the floor and stated to our LPN hSayy Carver that she slid out of her chair. She stated that she tried to pull her legs out from under the chair and she slipped. Nursing supervisior and family member (pts. Daughter Krista) were notified of her fall. Team huddle completed and pts. Chart updated . Pt. Did not sustain any injuries from her fall. No further orders from Dr. Diane Roy at this time.  Immanuel Douglass

## 2020-12-31 NOTE — PROGRESS NOTES
Occupational Therapy  OCCUPATIONAL THERAPY DAILY NOTE    Date:2020  Patient Name: Corrine Cardona  MRN: [id number]  : 1955  Room: -A     Referring Practitioner: Oswaldo Bravo MD  Diagnosis: CVA  Additional Pertinent Hx: arthritis, anxiety, hx cancer  Restrictions: Fall Risk, LUE/LLE proprioceptive deficits, L ankle instabilty- improves with shoes on,     Functional Assessment:   Date Status AE  Comments   Feeding 20  Mod I      Grooming 20  Mod I   Pt applied deodorant and brushed hair seated up in w/c at sink. Oral Care 20  Mod I/Set up  seated Pt brushed dentures for oral care seated at sink. Bathing 20  SBA  Seated & standing Pt completed UB/LB bathing & hair washing  during shower while incorporating LUE during all tasks. UB Dressing 20  Mod I  To jonatan t- shirt seated up in w/c.    LB Dressing 20  S/SBA   Pt donned underwear and pants then stood for clothing mgmt using bilateral skills. Footwear 20  Supervision   Pt donned socks,shoes and L AFO brace seated up in w/c using crossover method. Toileting 20  S/SBA  Grab bar Pt completed toilet hygiene along with clothing mgmt for toilet needs. Homemaking 20  Min A (meal prep)  SBA (laundry) ww         Functional Transfers / Balance:   Date Status DME  Comments   Sit Balance 20  Mod I  Sitting balance on EOB, on commode, on shower stall bench and up in w/c during ADL's    Stand Balance 20  S/SBA  ww  Grab bar  Pt stood during reji care, clothing mgmt and transfers using grab bar/sink for balance. [x] Tub      [x] Shower   Transfer 20  Min A       SBA  Ww, extended tub bench     Grab bar/shower stall bench       Pt completed w/c<>shower stall transfers with one cue for LLE placement prior to standing due to LLE AFO not on.      Commode   Transfer 20  SBA  rw  Grab bar  Pt utilized grab bar during w/c<>commode transfers to increase safety. Functional   Mobility 12/30/20  CGA /SBA  rw  AM:  Pt requested to defer fxl mobility this am until after her shower/LLE is on. Other:  On/off recliner, couch  standard bed  And kitchen chair without arm rests    12/30/20    CGA    ww      Functional Exercises / Activity:  AM:  Pt engaged in shower/hair washing during ADL;s to increase independence with dressing, grooming, bathing and transfers while incorporating LUE in all intended tasks to improve ROM,coordination and dexterity skills. Pt needed occ cues for pacing self to conserve energy during ADL's/shower transfers to increase safety. LUE ROM and strength with wheel and medium resistive theraputty on table top surface. Pt used left hand to locate and remove coins placed in therputty   Pt completed bow tying with good use of left hand in activity   Left hand strength with 5 # digi flex 3 sets 10 reps     Sensory / Neuromuscular Re-Education:      Cognitive Skills:   Status Comments   Problem   Solving Good - Demo during ADL's and transfers   Memory good  \"   Sequencing good  \"   Safety fair + Impaired due to proprioceptive deficits     Visual Perception:    Education:  Pt educated on pacing self along with energy conservation during ADL's, transfers and functional activities. [] Family teach completed on:    Pain Level: Pt did not c/o pain during OT session    Additional Notes:   12/29/20 Dynamometer : R 40#'s , L 20#'s  Pt continues to be unable to completed 9 hole peg test with left hand                                                                          R hand 9 hole peg test 32 secs     Patient has made good  progress during treatment sessions toward set goals. Therapy emphasis to obtain goals:ADL retraining, transfers training, endurance/balance retraining, therex, homemaking & pt/family education       [x] Continue with current OT Plan of care.   [] Prepare for Discharge     DISCHARGE RECOMMENDATIONS  Recommended DME:    Post Discharge Care:   []Home Independently  []Home with 24hr Care / Supervision [x]Home with Partial Supervision []Home with Home Health OT []Home with Out Pt OT []Other: ___   Comments:         Time in Time out Tx Time Breakdown  Variance:   First Session   7:45am 8:30am [x] Individual Tx-45  [] Concurrent Tx -  [] Co-Tx -   [] Group Tx -   [] Time Missed -     Second Session [phone] [] Individual Tx-  [x] Concurrent Tx -45    [] Co-Tx -   [] Group Tx -   [] Time Missed -     Third Session    [] Individual Tx-   [] Concurrent Tx -  [] Co-Tx -   [] Group Tx -   [] Time Missed -         Total Tx Time: 90 mins      Marcelo MOORE/L [address] OTR/L [id number]

## 2020-12-31 NOTE — PROGRESS NOTES
Progress Note  Date:2020       [facility]:2059/2966-K  Patient Blanca Russell     YOB: 1955     Age:65 y.o. Subjective    Subjective:  Symptoms:  Stable. She reports weakness. Diet:  Adequate intake. Activity level: Impaired due to weakness. Pain:  She reports no pain. Review of Systems   Neurological: Positive for weakness. Objective         Vitals Last 24 Hours:  TEMPERATURE:  Temp  Av.1 °F (36.2 °C)  Min: 96.1 °F (35.6 °C)  Max: 98 °F (36.7 °C)  RESPIRATIONS RANGE: Resp  Avg: 15.5  Min: 15  Max: 16  PULSE OXIMETRY RANGE: SpO2  Av.7 %  Min: 95 %  Max: 97 %  PULSE RANGE: Pulse  Av.3  Min: 72  Max: 84  BLOOD PRESSURE RANGE: Systolic (84HGS), KBR:289 , Min:92 , BRC:297   ; Diastolic (60DIJ), OER:13, Min:59, Max:65    I/O (24Hr): Intake/Output Summary (Last 24 hours) at 2020 0954  Last data filed at 2020 1344  Gross per 24 hour   Intake 720 ml   Output --   Net 720 ml     Objective:  General Appearance: In no acute distress. Vital signs: (most recent): Blood pressure 92/65, pulse 72, temperature 98 °F (36.7 °C), temperature source Oral, resp. rate 15, height 5' 6\" (1.676 m), weight 109 lb 4 oz (49.6 kg), SpO2 95 %. Vital signs are normal.    Output: Producing urine and producing stool. Lungs:  Normal effort and normal respiratory rate. Breath sounds clear to auscultation. Heart: Normal rate. Regular rhythm. S1 normal and S2 normal.    Abdomen: Abdomen is soft. Bowel sounds are normal.   There is no abdominal tenderness. Extremities: Normal range of motion. Neurological: Patient is alert. (4/5 left). Labs/Imaging/Diagnostics    Labs:  CBC:No results for input(s): WBC, RBC, HGB, HCT, MCV, RDW, PLT in the last 72 hours. CHEMISTRIES:No results for input(s): NA, K, CL, CO2, BUN, CREATININE, GLUCOSE, PHOS, MG in the last 72 hours.     Invalid input(s): CA  PT/INR:No results for input(s): PROTIME, INR in the last 72 hours.  APTT:No results for input(s): APTT in the last 72 hours. LIVER PROFILE:No results for input(s): AST, ALT, BILIDIR, BILITOT, ALKPHOS in the last 72 hours. Imaging Last 24 Hours:  No results found. Assessment//Plan           Hospital Problems           Last Modified POA    Acute CVA (cerebrovascular accident) ([facility] [address] 12/23/2020 Yes    Moderate protein-calorie malnutrition ([facility] [address] 12/30/2020 Yes        Assessment:    Condition: In stable condition. Improving.   (CVA). Plan:   Encourage ambulation. (Tolerating therapy well  Strength improving  Working on balance  Blood pressure is a little low but on no blood pressure meds).        Electronically signed by Jean Claude Vogt MD on 12/31/20 at 9:54 AM EST

## 2020-12-31 NOTE — PROGRESS NOTES
Physical Therapy    [facility]  Treatment Note    NAME: Rui Connelly  : 1955  MRN: [id number]    Date of Service: 2020  Evaluating Therapist: Rahel Munoz PT, DPT    ROOM: [facility]  DIAGNOSIS: CVA  PRECAUTIONS: falls, L hemiparesis, significant L extremity proprioceptive deficits leading to sensory ataxia, L AFO  HPI: Pt with past medical history significant for arthritis, anxiety, numbness/tingling of both legs, cancer, headaches; who presented to [facility] on 2020 with roughly 2 days of left-sided weakness.  The patient was not a candidate for TPA and/or thrombectomy. On imaging, she was found to have an acute ischemic lacunar infarct of the right thalamus. Social:  Pt lives with nephew in a 1 floor plan 1 step without rails + 4 steps with R HR to enter. Alternate entrance available with 4 GETACHEW and no HR. Her nephew works night shift and sleeps during the day. Pt's son and daughter live nearby and are willing to assist as needed. Prior to admission: Pt ambulated without AD, was functionally independent. 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 yes Yes  yes     Does pt have pain?  No c/o pain No c/o pain No c/o pain     Bed Mobility  Rolling: SBA  Supine to sit: SBA  Sit to supine: SBA  Scooting: SBA Supine>Sit Mod I NT Supervision Mod I   Transfers Sit to stand: ModA  Stand to sit: [address]  Stand pivot: ModA with [employer] Sit to stand: SBA  Stand to sit: SBA  Stand pivot: SBA with standard walker Sit to stand: SBA  Stand to sit: SBA  Stand pivot: SBA with standard walker SBA  Supervision   Ambulation    100 feet with [employer] with Debbie/ModA   160 feet x 1 reps with standard walker with SBA   (L custom AFO) 150 feet x 1 rep with standard walker SBA (L custom flexible AFO) 200 feet with AAD with  feet with AAD with Supervision   Walking 10 feet on uneven surface 10 feet with [employer] with ModA NT NT 10 feet with AAD with SBA 10 feet with AAD with  Supervision   Wheel Chair Mobility NT NA NA     Car Transfers Debbie with [employer] NT NT SBA  Supervision   Stair negotiation: ascended and descended  8 steps with 2 rails with Debbie/ModA NT 12 steps with R HR x 1 rep with SBA (lateral technique with BUE support on rail) 12 steps with 1 rail with SBA 12 steps with 1 rail with  Supervision   Curb Step:   ascended and descended 2 inch step with [employer] and Debbie/ModA 7.5 inch curb step x 2 reps with standard walker with SBA NT 7.5 inch step with AAD and SBA 7.5 inch step with AAD and Supervision   Picking up object off the floor Picked up cone with Min assist NT NT Will  2lb weight with SB assist Will  4lb weight with Supervision   BLE ROM WNL WNL      BLE Strength RLE grossly 5/5  L hip flexion 3+/5  L knee extension 4-/5  L ankle DF 4-/5 L ankle evertors 2/5  L ankle invertors 4/5 RLE grossly 5/5  L hip flexion 4-/5  L knee extension 4-/5  L ankle DF 4-/5 L ankle evertors 4-/5  L ankle invertors 4/5      Balance  Static and dynamic standing balance Debbie/ModA with [employer] Static and dynamic standing balance CGA/SBA with standard walker      Date Family Teach Completed        Is additional Family Teaching Needed?   Y or N        Hindering Progress Balance, L extremity hemiparesis/coordination deficits Balance, L extremity hemiparesis/coordination deficits      PT recommended ELOS 4 weeks       Team's Discharge Plan        Therapist at Team Meeting          Therapeutic Exercise:   AM: L AFO donning x 1 rep  Sit<>Stand transfer x 10 reps emphasis on safe hand/foot placement  PM: Ambulation with R  feet x 1 rep with Debbie  NR ascending/descending 2\" step + 4\" step + 6\" step with R SPC x 4 reps with Debbie  FW/BW/lateral stepping around edge of 2\" foam pad with R SPC x 2 laps Clockwise with Debbie  Ambulation without AD holding ball with  feet x 1 rep with Debbie/ModA    Patient education  Pt educated on safety implications of checking position of feet prior to sit<>Stand transfer    Patient response to education:   Pt verbalized understanding Pt demonstrated skill Pt requires further education in this area   yes no yes     Additional Comments: For AM session, pt received supine in bed with AFO donned (no shoe one). Reiterated recommendations to doff AFO with shoes and to leave AFO in shoes when not wearing in order to maintain skin integrity and for ease of donning/doffing. Pt requires moderate time to don L AFO however is able to do without manual assistance from therapist. Pt still requires frequent cueing for transfer safety which largely include mental errors such as checking foot placement prior to transfer. Goals downgraded to supervision level due to persistent safety deficits. AM  Time in: 1045  Time out: 1130    PM  Time in: 1345  Time out: 1430    Pt is making good progress toward established Physical Therapy goals. Continue with physical therapy current plan of care.     Karyn Wise, PT, DPT  [phone]

## 2020-12-31 NOTE — PLAN OF CARE
Problem: Skin Integrity:  Goal: Will show no infection signs and symptoms  Description: Will show no infection signs and symptoms  12/31/2020 1314 by Morgan Allen  Outcome: Met This Shift  12/31/2020 0532 by Peggy Savage RN  Outcome: Met This Shift  Goal: Absence of new skin breakdown  Description: Absence of new skin breakdown  12/31/2020 1314 by Morgan Allen  Outcome: Met This Shift  12/31/2020 0532 by Peggy Savage RN  Outcome: Met This Shift     Problem: Falls - Risk of:  Goal: Will remain free from falls  Description: Will remain free from falls  12/31/2020 1314 by Morgan Allen  Outcome: Met This Shift  12/31/2020 0532 by Peggy Savage RN  Outcome: Met This Shift  Goal: Absence of physical injury  Description: Absence of physical injury  12/31/2020 1314 by Morgan Allen  Outcome: Met This Shift  12/31/2020 0532 by Peggy Savage RN  Outcome: Met This Shift     Problem: Neurological  Goal: Maximum potential motor/sensory/cognitive function  Outcome: Met This Shift     Problem: Safety:  Goal: Free from accidental physical injury  Description: Free from accidental physical injury  12/31/2020 1314 by Morgan Allen  Outcome: Met This Shift  12/31/2020 0532 by Peggy Savage RN  Outcome: Met This Shift  Goal: Free from intentional harm  Description: Free from intentional harm  12/31/2020 1314 by Morgan Allen  Outcome: Met This Shift  12/31/2020 0532 by Peggy Savage RN  Outcome: Met This Shift     Problem: Daily Care:  Goal: Daily care needs are met  Description: Daily care needs are met  Outcome: Met This Shift     Problem: Pain:  Goal: Patient's pain/discomfort is manageable  Description: Patient's pain/discomfort is manageable  Outcome: Met This Shift     Problem: Skin Integrity:  Goal: Skin integrity will stabilize  Description: Skin integrity will stabilize  Outcome: Met This Shift

## 2020-12-31 NOTE — PLAN OF CARE
Problem: Skin Integrity:  Goal: Will show no infection signs and symptoms  Description: Will show no infection signs and symptoms  12/31/2020 0532 by Vishnu Weiner RN  Outcome: Met This Shift     Problem: Skin Integrity:  Goal: Absence of new skin breakdown  Description: Absence of new skin breakdown  12/31/2020 0532 by Vishnu Weiner RN  Outcome: Met This Shift     Problem: Falls - Risk of:  Goal: Will remain free from falls  Description: Will remain free from falls  12/31/2020 0532 by Vishnu Weiner RN  Outcome: Met This Shift     Problem: Falls - Risk of:  Goal: Absence of physical injury  Description: Absence of physical injury  Outcome: Met This Shift     Problem: Safety:  Goal: Free from accidental physical injury  Description: Free from accidental physical injury  Outcome: Met This Shift     Problem: Safety:  Goal: Free from intentional harm  Description: Free from intentional harm  Outcome: Met This Shift

## 2021-01-01 PROCEDURE — 6370000000 HC RX 637 (ALT 250 FOR IP): Performed by: INTERNAL MEDICINE

## 2021-01-01 PROCEDURE — 6370000000 HC RX 637 (ALT 250 FOR IP): Performed by: PHYSICAL MEDICINE & REHABILITATION

## 2021-01-01 PROCEDURE — 1280000000 HC REHAB R&B

## 2021-01-01 PROCEDURE — 97530 THERAPEUTIC ACTIVITIES: CPT

## 2021-01-01 RX ADMIN — GABAPENTIN 300 MG: 300 CAPSULE ORAL at 06:13

## 2021-01-01 RX ADMIN — TIZANIDINE 4 MG: 4 TABLET ORAL at 06:13

## 2021-01-01 RX ADMIN — CLOPIDOGREL 75 MG: 75 TABLET, FILM COATED ORAL at 09:33

## 2021-01-01 RX ADMIN — TIZANIDINE 4 MG: 4 TABLET ORAL at 18:58

## 2021-01-01 RX ADMIN — LEVOTHYROXINE SODIUM 50 MCG: 0.05 TABLET ORAL at 06:13

## 2021-01-01 RX ADMIN — ATORVASTATIN CALCIUM 40 MG: 40 TABLET, FILM COATED ORAL at 21:30

## 2021-01-01 RX ADMIN — Medication 1 TABLET: at 09:33

## 2021-01-01 RX ADMIN — CETIRIZINE HYDROCHLORIDE 10 MG: 10 TABLET, FILM COATED ORAL at 21:30

## 2021-01-01 RX ADMIN — GABAPENTIN 300 MG: 300 CAPSULE ORAL at 21:30

## 2021-01-01 RX ADMIN — SENNOSIDES 17.2 MG: 8.6 TABLET, FILM COATED ORAL at 21:30

## 2021-01-01 RX ADMIN — GABAPENTIN 300 MG: 300 CAPSULE ORAL at 13:43

## 2021-01-01 RX ADMIN — DOCUSATE SODIUM 100 MG: 100 CAPSULE, LIQUID FILLED ORAL at 09:33

## 2021-01-01 RX ADMIN — ASPIRIN 81 MG: 81 TABLET, COATED ORAL at 09:33

## 2021-01-01 ASSESSMENT — PAIN SCALES - GENERAL
PAINLEVEL_OUTOF10: 0
PAINLEVEL_OUTOF10: 0

## 2021-01-01 NOTE — PROGRESS NOTES
Progress Note  Date:2021       [id number]  Patient Jadiel Chatman     YOB: 1955     Age:65 y.o. Subjective    Subjective:  Symptoms:  Stable. She reports weakness. Diet:  Adequate intake. Activity level: Impaired due to weakness. Pain:  She reports no pain. Review of Systems   Neurological: Positive for weakness. Objective         Vitals Last 24 Hours:  TEMPERATURE:  Temp  Av.3 °F (36.3 °C)  Min: 97.3 °F (36.3 °C)  Max: 97.3 °F (36.3 °C)  RESPIRATIONS RANGE: Resp  Av  Min: 16  Max: 16  PULSE OXIMETRY RANGE: SpO2  Av %  Min: 96 %  Max: 96 %  PULSE RANGE: Pulse  Av  Min: 61  Max: 77  BLOOD PRESSURE RANGE: Systolic (71ACP), VMD:322 , Min:119 , VOO:632   ; Diastolic (54KGT), KWV:76, Min:62, Max:62    I/O (24Hr): Intake/Output Summary (Last 24 hours) at 2021 0843  Last data filed at 2020 1700  Gross per 24 hour   Intake 1200 ml   Output --   Net 1200 ml     Objective:  General Appearance: In no acute distress. Vital signs: (most recent): Blood pressure 119/62, pulse 61, temperature 97.3 °F (36.3 °C), temperature source Tympanic, resp. rate 16, height 5' 6\" (1.676 m), weight 109 lb 4 oz (49.6 kg), SpO2 96 %. Vital signs are normal.    Output: Producing urine and producing stool. Lungs:  Normal effort and normal respiratory rate. Breath sounds clear to auscultation. Heart: Normal rate. Regular rhythm. S1 normal and S2 normal.    Abdomen: Abdomen is soft. Bowel sounds are normal.   There is no abdominal tenderness. Extremities: Normal range of motion. Neurological: Patient is alert. (4/5 strength). Labs/Imaging/Diagnostics    Labs:  CBC:No results for input(s): WBC, RBC, HGB, HCT, MCV, RDW, PLT in the last 72 hours. CHEMISTRIES:No results for input(s): NA, K, CL, CO2, BUN, CREATININE, GLUCOSE, PHOS, MG in the last 72 hours.     Invalid input(s): CA  PT/INR:No results for input(s): PROTIME, INR in the last 72 hours.  APTT:No results for input(s): APTT in the last 72 hours. LIVER PROFILE:No results for input(s): AST, ALT, BILIDIR, BILITOT, ALKPHOS in the last 72 hours. Imaging Last 24 Hours:  No results found. Assessment//Plan           Hospital Problems           Last Modified POA    Acute CVA (cerebrovascular accident) ([facility] [address] 12/23/2020 Yes    Moderate protein-calorie malnutrition ([facility] [address] 12/30/2020 Yes      Assessment:      Date   Status   AE    Comments     Feeding   12/29/20    Mod I              Grooming   12/31/20    Mod I        Pt applied deodorant and brushed hair seated up in w/c at sink. Oral Care   12/31/20    Mod I/Set up    seated   Pt brushed dentures for oral care seated at sink. Bathing   12/31/20    SBA    Seated & standing   Pt completed UB/LB bathing & hair washing  during shower while incorporating LUE during all tasks. UB Dressing   12/31/20    Mod I       To jonatan t- shirt seated up in w/c.      LB Dressing   12/31/20    S/SBA        Pt donned underwear and pants then stood for clothing mgmt using bilateral skills. Footwear   12/31/20    Supervision        Pt donned socks,shoes and L AFO brace seated up in w/c using crossover method. Toileting   12/31/20    S/SBA    Grab bar   Pt completed toilet hygiene along with clothing mgmt for toilet needs. Homemaking   12/28/20 12/30/20  Min A (meal prep)    SBA (laundry) ww                 Functional Transfers / Balance:      Date Status DME  Comments   Sit Balance 12/31/20  Mod I   Sitting balance on EOB, on commode, on shower stall bench and up in w/c during ADL's    Stand Balance 12/31/20  S/SBA  ww  Grab bar  Pt stood during reji care, clothing mgmt and transfers using grab bar/sink for balance. [x]? Tub        [x]?  Shower   Transfer 12/30/20 12/31/20  Min A         SBA  Ww, extended tub bench      Grab bar/shower stall bench          Pt completed w/c<>shower stall transfers with one cue for LLE placement prior to standing due to LLE AFO not on. Commode   Transfer 12/31/20  SBA  rw  Grab bar  Pt utilized grab bar during w/c<>commode transfers to increase safety. Functional   Mobility 12/30/20  CGA /SBA  rw  AM:  Pt requested to defer fxl mobility this am until after her shower/LLE is on. Other:  On/off recliner, couch  standard bed  And            Assessment:    Condition: In stable condition. Improving.   (CVA). Plan:   Encourage ambulation. (Tolerating therapy well  Strength is slowly improving  Blood pressure is low but she is on no blood pressure meds).        Electronically signed by Evy Rodriguez MD on 1/1/21 at 8:43 AM EST

## 2021-01-01 NOTE — PLAN OF CARE
Problem: Skin Integrity:  Goal: Will show no infection signs and symptoms  Description: Will show no infection signs and symptoms  1/1/2021 1828 by Orlando Orozco RN  Outcome: Ongoing  1/1/2021 1046 by Orlando Orozco RN  Outcome: Ongoing  Goal: Absence of new skin breakdown  Description: Absence of new skin breakdown  1/1/2021 1828 by Orlando Orozco RN  Outcome: Ongoing  1/1/2021 1046 by Orlando Orozco RN  Outcome: Ongoing     Problem: Falls - Risk of:  Goal: Will remain free from falls  Description: Will remain free from falls  1/1/2021 1828 by Orlando Orozco RN  Outcome: Ongoing  1/1/2021 1046 by Orlando Orozco RN  Outcome: Ongoing  Goal: Absence of physical injury  Description: Absence of physical injury  1/1/2021 1828 by Orlando Orozco RN  Outcome: Ongoing  1/1/2021 1046 by Orlando Orozco RN  Outcome: Ongoing     Problem: Neurological  Goal: Maximum potential motor/sensory/cognitive function  1/1/2021 1828 by Orlando Orozco RN  Outcome: Ongoing  1/1/2021 1046 by Orlando Orozco RN  Outcome: Ongoing     Problem: Safety:  Goal: Free from accidental physical injury  Description: Free from accidental physical injury  1/1/2021 1828 by Orlando Orozco RN  Outcome: Ongoing  1/1/2021 1046 by Orlando Orozco RN  Outcome: Ongoing  Goal: Free from intentional harm  Description: Free from intentional harm  1/1/2021 1828 by Orlando Orozco RN  Outcome: Ongoing  1/1/2021 1046 by Orlando Orozco RN  Outcome: Ongoing     Problem: Daily Care:  Goal: Daily care needs are met  Description: Daily care needs are met  1/1/2021 1828 by Orlando Orozco RN  Outcome: Ongoing  1/1/2021 1046 by Orlando Orozco RN  Outcome: Ongoing     Problem: Pain:  Goal: Patient's pain/discomfort is manageable  Description: Patient's pain/discomfort is manageable  1/1/2021 1828 by Orlando Orozco RN  Outcome: Ongoing  1/1/2021 1046 by Orlando Orozco RN  Outcome: Ongoing     Problem: Skin Integrity:  Goal: Skin integrity will stabilize  Description: Skin integrity will stabilize  1/1/2021 1828 by Taylor Kennedy RN  Outcome: Ongoing  1/1/2021 1046 by Taylor Kennedy RN  Outcome: Ongoing

## 2021-01-01 NOTE — PLAN OF CARE
Problem: Skin Integrity:  Goal: Will show no infection signs and symptoms  Description: Will show no infection signs and symptoms  1/1/2021 1046 by Jakob Robles RN  Outcome: Ongoing     Problem: Skin Integrity:  Goal: Absence of new skin breakdown  Description: Absence of new skin breakdown  1/1/2021 1046 by Jakob Robles RN  Outcome: Ongoing     Problem: Falls - Risk of:  Goal: Will remain free from falls  Description: Will remain free from falls  1/1/2021 1046 by Jakob Robles RN  Outcome: Ongoing     Problem: Falls - Risk of:  Goal: Absence of physical injury  Description: Absence of physical injury  1/1/2021 1046 by Jakob Robles RN  Outcome: Ongoing     Problem: Neurological  Goal: Maximum potential motor/sensory/cognitive function  1/1/2021 1046 by Jakob Robles RN  Outcome: Ongoing     Problem: Safety:  Goal: Free from intentional harm  Description: Free from intentional harm  1/1/2021 1046 by Jakob Robles RN  Outcome: Ongoing     Problem: Daily Care:  Goal: Daily care needs are met  Description: Daily care needs are met  1/1/2021 1046 by Jakob Robles RN  Outcome: Ongoing     Problem: Pain:  Goal: Patient's pain/discomfort is manageable  Description: Patient's pain/discomfort is manageable  1/1/2021 1046 by Jakob Robles RN  Outcome: Ongoing     Problem: Skin Integrity:  Goal: Skin integrity will stabilize  Description: Skin integrity will stabilize  1/1/2021 1046 by Jakob Robles RN  Outcome: Ongoing

## 2021-01-01 NOTE — PROGRESS NOTES
placement prior to transfer. AM  Time in: 930  Time out: 1000        Pt is making good progress toward established Physical Therapy goals. Continue with physical therapy current plan of care.       Radha Marie PTA [id number]

## 2021-01-02 PROCEDURE — 6370000000 HC RX 637 (ALT 250 FOR IP): Performed by: INTERNAL MEDICINE

## 2021-01-02 PROCEDURE — 97530 THERAPEUTIC ACTIVITIES: CPT

## 2021-01-02 PROCEDURE — 6370000000 HC RX 637 (ALT 250 FOR IP): Performed by: PHYSICAL MEDICINE & REHABILITATION

## 2021-01-02 PROCEDURE — 97535 SELF CARE MNGMENT TRAINING: CPT

## 2021-01-02 PROCEDURE — 1280000000 HC REHAB R&B

## 2021-01-02 RX ADMIN — CLOPIDOGREL 75 MG: 75 TABLET, FILM COATED ORAL at 08:41

## 2021-01-02 RX ADMIN — GABAPENTIN 300 MG: 300 CAPSULE ORAL at 13:52

## 2021-01-02 RX ADMIN — TIZANIDINE 4 MG: 4 TABLET ORAL at 11:24

## 2021-01-02 RX ADMIN — GABAPENTIN 300 MG: 300 CAPSULE ORAL at 21:02

## 2021-01-02 RX ADMIN — GABAPENTIN 300 MG: 300 CAPSULE ORAL at 04:53

## 2021-01-02 RX ADMIN — TIZANIDINE 4 MG: 4 TABLET ORAL at 04:52

## 2021-01-02 RX ADMIN — ASPIRIN 81 MG: 81 TABLET, COATED ORAL at 08:41

## 2021-01-02 RX ADMIN — ATORVASTATIN CALCIUM 40 MG: 40 TABLET, FILM COATED ORAL at 21:02

## 2021-01-02 RX ADMIN — CETIRIZINE HYDROCHLORIDE 10 MG: 10 TABLET, FILM COATED ORAL at 21:03

## 2021-01-02 RX ADMIN — Medication 1 TABLET: at 08:41

## 2021-01-02 RX ADMIN — LEVOTHYROXINE SODIUM 50 MCG: 0.05 TABLET ORAL at 06:38

## 2021-01-02 RX ADMIN — TIZANIDINE 4 MG: 4 TABLET ORAL at 21:02

## 2021-01-02 ASSESSMENT — PAIN SCALES - GENERAL
PAINLEVEL_OUTOF10: 0

## 2021-01-02 NOTE — PROGRESS NOTES
Bladder scan patient for 49 ml post void and bowel movement. Patient requested to be washed up because \"no one was coming\" to see her. She was oriented to earlier events in the day where she was seen by Physical therapy.  After orientation to situation she stated that she remembers participating in therapy \" walking up stairs, getting in the car\"

## 2021-01-02 NOTE — PROGRESS NOTES
Upon waking patient was found to be confused of time of day, day of the week. She stated that she slept through breakfast. Reoriented patient to the time of day and date.

## 2021-01-02 NOTE — PROGRESS NOTES
Occupational Therapy  OCCUPATIONAL THERAPY DAILY NOTE    Date:2021  Patient Name: Dana Anthony  MRN: [id number]  : 1955  Room: [address] [place] [address]-A     Referring Practitioner: Ilda Arias MD  Diagnosis: CVA  Additional Pertinent Hx: arthritis, anxiety, hx cancer  Restrictions: Fall Risk, LUE/LLE proprioceptive deficits, L ankle instabilty- improves with shoes on,     Functional Assessment:   Date Status AE  Comments   Feeding 20  Mod I      Grooming 20  Mod I            Oral Care 20  Mod I/Set up  seated    Bathing 20  SBA  Seated & standing    UB Dressing 20  Mod I     LB Dressing 20  S/SBA       Footwear 21 Supervision  seated Pt donned socks,shoes and L AFO brace seated up on EOB using crossover method. Toileting 20  S/SBA  Grab bar      Homemaking 21 SBA Std.walker vs ww Pt engaged in kitchen mobility training at std. Walker level. Pt attempted transporting items using both handled bucket then tried walker basket on standard device. Pt able to carry items in bucket over to fridge/drawer x1 then x2 used walker basket. Pt completed fridge task removing items with occ cues for walker placement along with body mechanics when reaching & placing items to increase safety. Pt demo good follow thru with intended tasks. Pt stated she uses microwave with education completed with counter sliding method for bowl when heating up food. Demo transporting larger/heavier items using walker basket for balance and reviewed not having too much to weigh down walker. Discussed using crock pot along with transferring liquids from large jugs in to smaller water bottles for easier transporting in bucket or basket.            Functional Transfers / Balance:   Date Status DME  Comments   Sit Balance 21  Mod I  Sitting balance on EOB for footwear, up in w/c, on standard commode/ext tub bench in apt along with sofa and firm recliner    Stand Balance 21  S/SBA  Std. Walker   Grab bar  Standing balance during transfers and ambulation in OT apt and during kitchen mobility training. [x] Tub      [x] Shower   Transfer 01/02/21 12/31/20  SBA      SBA  Std. Walker  extended tub bench     Grab bar/shower stall bench Pt completed ext tub bench transfers with no assist for LLE mgmt in./out of tub. Commode   Transfer 01/02/21 SBA  Std. walker  Grab bar  Pt ambulated to std commode in apt using std walker elevating on./off device using grab bar. Discussed grab bar for home or 3:1 device over preexisting toilet for safety. Functional   Mobility 01/02/21  SBA  Standard walker  Pt used std. Walker during fxl mobility in OT apt including over carpet and in OT kitchen. No LOB noted and wearing LLE AFO for balance. Other:  On/off firm recliner and sofa   01/02/21    SBA Std walker  Pt completed transfers on/off sofa and firm recliner using std. Amy Metjose r for balance      Functional Exercises / Activity:  Pt engaged in functional transfers training in OT apt including firm recliner, sofa, ext tub bench and standard commode with demo done in regards to 3:1 device to increase safety along with independence with transfers. Pt engaged in OT kitchen mobility using std walker with x1 plastic handled bucket x2 using walker basket to transport items from place to place at SBA with vc's for walker mgmt and body mechanics to increase awareness. Pt pacing self well on this date to conserve energy while improving overall endurance and strength.    Sensory / Neuromuscular Re-Education:      Cognitive Skills:   Status Comments   Problem   Solving Good - Demo during functional transfers, ambulation and kitchen mobility skills    Memory good  \"   Sequencing good  \"   Safety Good - \"     Visual Perception:    Education:  Pt educated with safety during functional transfers, ambulation and kitchen mobility training to increase independence including walker basket or bucket to transport items.     [] Family teach completed on:    Pain Level: Pt did not c/o pain during OT session    Additional Notes:   12/29/20 Dynamometer : R 40#'s , L 20#'s  Pt continues to be unable to completed 9 hole peg test with left hand                                                                          R hand 9 hole peg test 32 secs     Patient has made good  progress during treatment sessions toward set goals. Therapy emphasis to obtain goals:ADL retraining, transfers training, endurance/balance retraining, therex, homemaking & pt/family education       [x] Continue with current OT Plan of care.   [] Prepare for Discharge     DISCHARGE RECOMMENDATIONS  Recommended DME:    Post Discharge Care:   []Home Independently  []Home with 24hr Care / Supervision [x]Home with Partial Supervision []Home with Home Health OT []Home with Out Pt OT []Other: ___   Comments:         Time in Time out Tx Time Breakdown  Variance:   First Session   10:35am 11:45am [x] Individual 70mins  [] Concurrent Tx -  [] Co-Tx -   [] Group Tx -   [] Time Missed -     Second Session   [] Individual Tx-  [] Concurrent Tx -    [] Co-Tx -   [] Group Tx -   [] Time Missed -     Third Session    [] Individual Tx-   [] Concurrent Tx -  [] Co-Tx -   [] Group Tx -   [] Time Missed -         Total Tx Time: 70 mins      Lola MOORE/GEOFF [id number]

## 2021-01-02 NOTE — PLAN OF CARE
Problem: Skin Integrity:  Goal: Will show no infection signs and symptoms  Description: Will show no infection signs and symptoms  Outcome: Ongoing  Goal: Absence of new skin breakdown  Description: Absence of new skin breakdown  Outcome: Ongoing     Problem: Falls - Risk of:  Goal: Will remain free from falls  Description: Will remain free from falls  Outcome: Ongoing  Goal: Absence of physical injury  Description: Absence of physical injury  Outcome: Ongoing     Problem: Neurological  Goal: Maximum potential motor/sensory/cognitive function  Outcome: Ongoing     Problem: Safety:  Goal: Free from accidental physical injury  Description: Free from accidental physical injury  Outcome: Ongoing  Goal: Free from intentional harm  Description: Free from intentional harm  Outcome: Ongoing     Problem: Daily Care:  Goal: Daily care needs are met  Description: Daily care needs are met  Outcome: Ongoing     Problem: Pain:  Goal: Patient's pain/discomfort is manageable  Description: Patient's pain/discomfort is manageable  Outcome: Ongoing     Problem: Skin Integrity:  Goal: Skin integrity will stabilize  Description: Skin integrity will stabilize  Outcome: Ongoing

## 2021-01-02 NOTE — PLAN OF CARE
Problem: Skin Integrity:  Goal: Will show no infection signs and symptoms  Description: Will show no infection signs and symptoms  1/2/2021 1728 by Toby Lynch RN  Outcome: Ongoing  1/2/2021 0846 by Toby Lynch RN  Outcome: Ongoing  Goal: Absence of new skin breakdown  Description: Absence of new skin breakdown  1/2/2021 1728 by Toby Lynch RN  Outcome: Ongoing  1/2/2021 0846 by Toby Lynch RN  Outcome: Ongoing     Problem: Falls - Risk of:  Goal: Will remain free from falls  Description: Will remain free from falls  1/2/2021 1728 by Toby Lynch RN  Outcome: Ongoing  1/2/2021 0846 by Toby Lynch RN  Outcome: Ongoing  Goal: Absence of physical injury  Description: Absence of physical injury  1/2/2021 1728 by Toby Lynch RN  Outcome: Ongoing  1/2/2021 0846 by Toby Lynch RN  Outcome: Ongoing     Problem: Neurological  Goal: Maximum potential motor/sensory/cognitive function  1/2/2021 1728 by Toby Lynch RN  Outcome: Ongoing  1/2/2021 0846 by Toby Lnych RN  Outcome: Ongoing     Problem: Safety:  Goal: Free from accidental physical injury  Description: Free from accidental physical injury  1/2/2021 1728 by Toby Lynch RN  Outcome: Ongoing  1/2/2021 0846 by Toby Lynch RN  Outcome: Ongoing  Goal: Free from intentional harm  Description: Free from intentional harm  1/2/2021 1728 by Toby Lynch RN  Outcome: Ongoing  1/2/2021 0846 by Toby Lynch RN  Outcome: Ongoing     Problem: Daily Care:  Goal: Daily care needs are met  Description: Daily care needs are met  1/2/2021 1728 by Toby Lynch RN  Outcome: Ongoing  1/2/2021 0846 by Toby Lynch RN  Outcome: Ongoing     Problem: Pain:  Goal: Patient's pain/discomfort is manageable  Description: Patient's pain/discomfort is manageable  1/2/2021 1728 by Toby Lynch RN  Outcome: Ongoing  1/2/2021 0846 by Toby Lynch RN  Outcome: Ongoing     Problem: Skin Integrity:  Goal: Skin integrity will stabilize  Description: Skin integrity will stabilize  1/2/2021 1728 by Rickie Jose RN  Outcome: Ongoing  1/2/2021 0846 by Rickie Jose, RN  Outcome: Ongoing

## 2021-01-02 NOTE — PROGRESS NOTES
Progress Note  Date:2021       [id number]  Patient Amanda Kelsey     YOB: 1955     Age:65 y.o. Subjective    Subjective:  Symptoms:  Stable. She reports weakness. Diet:  Adequate intake. Activity level: Impaired due to weakness. Pain:  She reports no pain. Review of Systems   Neurological: Positive for weakness. Objective         Vitals Last 24 Hours:  TEMPERATURE:  Temp  Av °F (36.7 °C)  Min: 97.6 °F (36.4 °C)  Max: 98.3 °F (36.8 °C)  RESPIRATIONS RANGE: Resp  Av  Min: 16  Max: 16  PULSE OXIMETRY RANGE: SpO2  Av %  Min: 95 %  Max: 95 %  PULSE RANGE: Pulse  Av  Min: 62  Max: 70  BLOOD PRESSURE RANGE: Systolic (34WEP), GUT:285 , Min:112 , XQI:487   ; Diastolic (35PVG), NWT:45, Min:67, Max:69    I/O (24Hr): Intake/Output Summary (Last 24 hours) at 2021 0834  Last data filed at 2021 1841  Gross per 24 hour   Intake 840 ml   Output --   Net 840 ml     Objective:  General Appearance: In no acute distress. Vital signs: (most recent): Blood pressure 116/69, pulse 62, temperature 97.6 °F (36.4 °C), temperature source Oral, resp. rate 16, height 5' 6\" (1.676 m), weight 109 lb 4 oz (49.6 kg), SpO2 95 %. Vital signs are normal.    Output: Producing urine and producing stool. Lungs:  Normal effort and normal respiratory rate. Breath sounds clear to auscultation. Heart: Normal rate. Regular rhythm. S1 normal and S2 normal.    Abdomen: Abdomen is soft. Bowel sounds are normal.   There is no abdominal tenderness. Extremities: Normal range of motion. Neurological: Patient is alert. (4/5 strength). Labs/Imaging/Diagnostics    Labs:  CBC:No results for input(s): WBC, RBC, HGB, HCT, MCV, RDW, PLT in the last 72 hours. CHEMISTRIES:No results for input(s): NA, K, CL, CO2, BUN, CREATININE, GLUCOSE, PHOS, MG in the last 72 hours.     Invalid input(s): CA  PT/INR:No results for input(s): PROTIME, INR in the last 72

## 2021-01-03 PROCEDURE — 97530 THERAPEUTIC ACTIVITIES: CPT

## 2021-01-03 PROCEDURE — 6370000000 HC RX 637 (ALT 250 FOR IP): Performed by: PHYSICAL MEDICINE & REHABILITATION

## 2021-01-03 PROCEDURE — 1280000000 HC REHAB R&B

## 2021-01-03 PROCEDURE — 6370000000 HC RX 637 (ALT 250 FOR IP): Performed by: INTERNAL MEDICINE

## 2021-01-03 RX ADMIN — ASPIRIN 81 MG: 81 TABLET, COATED ORAL at 08:16

## 2021-01-03 RX ADMIN — TIZANIDINE 4 MG: 4 TABLET ORAL at 13:33

## 2021-01-03 RX ADMIN — Medication 1 TABLET: at 08:16

## 2021-01-03 RX ADMIN — LEVOTHYROXINE SODIUM 50 MCG: 0.05 TABLET ORAL at 06:12

## 2021-01-03 RX ADMIN — GABAPENTIN 300 MG: 300 CAPSULE ORAL at 13:34

## 2021-01-03 RX ADMIN — CLOPIDOGREL 75 MG: 75 TABLET, FILM COATED ORAL at 08:16

## 2021-01-03 RX ADMIN — ATORVASTATIN CALCIUM 40 MG: 40 TABLET, FILM COATED ORAL at 21:09

## 2021-01-03 RX ADMIN — GABAPENTIN 300 MG: 300 CAPSULE ORAL at 06:12

## 2021-01-03 RX ADMIN — TIZANIDINE 4 MG: 4 TABLET ORAL at 02:56

## 2021-01-03 RX ADMIN — GABAPENTIN 300 MG: 300 CAPSULE ORAL at 21:48

## 2021-01-03 RX ADMIN — TIZANIDINE 4 MG: 4 TABLET ORAL at 21:09

## 2021-01-03 RX ADMIN — CETIRIZINE HYDROCHLORIDE 10 MG: 10 TABLET, FILM COATED ORAL at 21:46

## 2021-01-03 ASSESSMENT — PAIN SCALES - GENERAL
PAINLEVEL_OUTOF10: 0
PAINLEVEL_OUTOF10: 0

## 2021-01-03 NOTE — PROGRESS NOTES
Supervision   Wheel Chair Mobility NT NA      Car Transfers Debbie with [employer] SBA using standard walker  SBA  Supervision   Stair negotiation: ascended and descended  8 steps with 2 rails with Debbie/ModA 12 steps with R HR x 1 rep with SBA (lateral technique with BUE support on rail)   12 steps with 1 rail with SBA 12 steps with 1 rail with  Supervision   Curb Step:   ascended and descended 2 inch step with [employer] and Debbie/ModA NT  7.5 inch step with AAD and SBA 7.5 inch step with AAD and Supervision   Picking up object off the floor Picked up cone with Min assist NT  Will  2lb weight with SB assist Will  4lb weight with Supervision   BLE ROM WNL WNL      BLE Strength RLE grossly 5/5  L hip flexion 3+/5  L knee extension 4-/5  L ankle DF 4-/5 L ankle evertors 2/5  L ankle invertors 4/5 RLE grossly 5/5  L hip flexion 4-/5  L knee extension 4-/5  L ankle DF 4-/5 L ankle evertors 4-/5  L ankle invertors 4/5      Balance  Static and dynamic standing balance Debbie/ModA with [employer] Static and dynamic standing balance SBA with standard walker      Date Family Teach Completed        Is additional Family Teaching Needed? Y or N        Hindering Progress Balance, L extremity hemiparesis/coordination deficits Balance, L extremity hemiparesis/coordination deficits      PT recommended ELOS 4 weeks       Team's Discharge Plan        Therapist at Team Meeting          Therapeutic Exercise:   AM: L AFO donning x 1 rep sitting EOB  VCs for safety  Sit<>Stand transfer x5 reps emphasis on safe hand/foot placement    Patient education  Pt educated on foot and hand placement with transfers, foot placement with stair training and forward gaze with amb. Patient response to education:   Pt verbalized understanding Pt demonstrated skill Pt requires further education in this area   yes no yes     Additional Comments: Pt received supine in bed and was assisted with donning AFO. Vcs for setup and technique with transfers.   Pt required cueing for safe placement of SW with pivot in crowded environment. Pt amb with slow gait speed and required VCs for forward gaze. Occasional cueing for L hand placement and grasp on SW.  VCs for hand placement with car transfer and for foot placement with steps. Pt attempted to perform steps using B HR and educated on importance of performing to simulate home setup. AM  Time in: 1055  Time out: 1125    Pt is making good progress toward established Physical Therapy goals. Continue with physical therapy current plan of care.     Suzanna Van, PT, DPT  License [id number]

## 2021-01-03 NOTE — PROGRESS NOTES
Therapeutic Recreation Assessment     LEISURE INTEREST SURVEY               Key: P = Past Interest C = Current Interest F = Future Interest     Arts/Crafts:  ___Mechanical  ___Woodworking    ___ Painting   ___Floral arranging ___ Ceramics         _ __ Knitting                                                     _C__ Crocheting        ___Other: ___________      Cooking:  _C _Baking _C__Cooking    ___   Other: _______   Comments:       Games:  ___Cards  ___Darts  _P__Board games    ___Word games  ___Crossword puzzles    ___Seek-n-find/jumble                   _C__Other: Nico Britton / Ermalinda Chappell / Zehra Hannon / Sorry________      Horticulture:  ___Vegetables  _P__Flowers  ___House plants                                                     ___Other: ___________      House/Yard Care:  ___Ironing  _C__Laundry  __C_Cleaning                                                      ___Repairs              ___Lawn care                                                     ___Other: ___________      Music Listening/Playing:  ___Classical       ___Big Band       ___Rock-n-Roll                                                     ___Country          ___R&B             ___Gospel/Hymns                                                     C___Other: __A variety_________      Outdoor Activities:  ___Hunting          ___Fishing          _P__Camping                                                     _P__Other: __Horses_________      Pets/Animals:  _P__Dogs             ___Cats                ___Fish                                                     ___Birds             _P__Livestock         ___Other: _________    Reading:   ___Newspapers  ___Magazines ___Other:stories                                                     ___Other: ___________      Zoroastrianism Activities:  [place]: ___________   [place] Activities: ___________      Shopping:   ___Grocery  ___Clothing  ___Flea market     _C__Other: __A variety_________      Socialization: _C__Family _C__Friends  ___Neighbors       ___Church  ___Volunteer ___Club/Organization                                                     ___Other: ___________    Sports/Exercises:  ___Walking  ___Football  ___Basketball     ___Golf  ___Baseball  ___Tennis       ___Bowling  ___Other: ___________      Traveling:   ___Global  _C__Stateside  _C__Local       ___Other: ___________      TV/Radio:   ___Mysteries  ___Comedies ___Romance                                                     ___Game show       ___Sports       ___News                                                      ___Talk show          ___Other: ___________      Other:    Personal Leisure Profile:   Question Response   Attributes Identify a positive quality: []Ambitious  []Appreciative  []Caring  []Courteous  []Considerate  []Compassionate  []Creative  []Dependable   []Generous  []Honest  []Hard working  [x]Helpful  []Kind  []Frio   []Silt  []Mannerly  []Patient  []Polite  []Respectful  []Sociable  []Sense of humor  []Thoughtful  []Other: ___________    You are very good at Being a parent   Awareness Why do you participate in your leisure interest: []Competition  []Creativity  []Concentration  []Exercise  []Enjoyment  []Fun  []Hand/eye Coordination  []Increase confidence  []Learning a new skill  []Relaxation  []Social Interaction  []Supporting one another  []Thinking  [x]Other: __Keep busy_________    Are your leisure activities limited at this time? [x]Yes  []No  Comments: _________    How often do you participate in your leisure interest? Everyday   Resources Name a restaurant, store or business you frequent? Erle Copper When are you most happy?  With family     Barriers to Leisure Participation:  []Visual   [][place]  []Cognition/Communication  []Motivation  []Financial  []Transportation  []Time Constraints  [x]Physical Ability  []Leisure Awareness  []Drug/Alcohol  []Other: ___________    Recommendations:  []Group Session  [x]Individual Session  []Client Refused Services  []No Therapy  []Other: ___________    Objectives:  []Establish adaptations for leisure involvement   []Increase Self worth/self esteem  []Community reintegration training   [x]Social interaction  [x]Leisure participation  []Other: ___________    Goals for Therapeutic Recreation Services:   Social Interaction:   Admission Functional [place] Measure: ___6________  Discharge Functional [place] Measure: ___7________   Other:________________________________      Leisure Choice/ Increase July Quality of Life: To participate in 1 premorbid leisure activities of choice by discharge to increase leisure choice and independence thus increasing the overall quality of his/her life. Socialization/Social Interaction: To increase social interaction skills by introducing self 1 x per week at the beginning of TR session Socialization/Social Interaction: To initiate conversation 1 x per week during the TR session    Leisure Activity Tolerance: To increase leisure tolerance by attending 1 leisure activities per week for 20 minutes with active participation. Self Expression: To participate in 1 leisure activity(s) of choice, identifying 1 benefits to leisure participation. Rogerio Mo

## 2021-01-04 PROCEDURE — 6370000000 HC RX 637 (ALT 250 FOR IP): Performed by: INTERNAL MEDICINE

## 2021-01-04 PROCEDURE — 97530 THERAPEUTIC ACTIVITIES: CPT

## 2021-01-04 PROCEDURE — 97535 SELF CARE MNGMENT TRAINING: CPT

## 2021-01-04 PROCEDURE — 6370000000 HC RX 637 (ALT 250 FOR IP): Performed by: PHYSICAL MEDICINE & REHABILITATION

## 2021-01-04 PROCEDURE — 99232 SBSQ HOSP IP/OBS MODERATE 35: CPT | Performed by: PHYSICAL MEDICINE & REHABILITATION

## 2021-01-04 PROCEDURE — 1280000000 HC REHAB R&B

## 2021-01-04 RX ADMIN — LEVOTHYROXINE SODIUM 50 MCG: 0.05 TABLET ORAL at 06:26

## 2021-01-04 RX ADMIN — Medication 1 TABLET: at 07:53

## 2021-01-04 RX ADMIN — GABAPENTIN 300 MG: 300 CAPSULE ORAL at 06:26

## 2021-01-04 RX ADMIN — CLOPIDOGREL 75 MG: 75 TABLET, FILM COATED ORAL at 07:53

## 2021-01-04 RX ADMIN — DOCUSATE SODIUM 100 MG: 100 CAPSULE, LIQUID FILLED ORAL at 07:53

## 2021-01-04 RX ADMIN — GABAPENTIN 300 MG: 300 CAPSULE ORAL at 13:01

## 2021-01-04 RX ADMIN — ASPIRIN 81 MG: 81 TABLET, COATED ORAL at 07:53

## 2021-01-04 RX ADMIN — ATORVASTATIN CALCIUM 40 MG: 40 TABLET, FILM COATED ORAL at 21:41

## 2021-01-04 RX ADMIN — TIZANIDINE 4 MG: 4 TABLET ORAL at 21:41

## 2021-01-04 RX ADMIN — GABAPENTIN 300 MG: 300 CAPSULE ORAL at 21:41

## 2021-01-04 RX ADMIN — TIZANIDINE 4 MG: 4 TABLET ORAL at 13:01

## 2021-01-04 RX ADMIN — TIZANIDINE 4 MG: 4 TABLET ORAL at 06:26

## 2021-01-04 RX ADMIN — CETIRIZINE HYDROCHLORIDE 10 MG: 10 TABLET, FILM COATED ORAL at 21:41

## 2021-01-04 NOTE — PROGRESS NOTES
Occupational Therapy  OCCUPATIONAL THERAPY DAILY NOTE    Date:2021  Patient Name: Alberto Waddell  MRN: [id number]  : 1955  Room: [address] [place] [address]     Referring Practitioner: Zuleyma Aleman MD  Diagnosis: CVA  Additional Pertinent Hx: arthritis, anxiety, hx cancer  Restrictions: Fall Risk, LUE/LLE proprioceptive deficits, L ankle instabilty- improves with shoes on,     Functional Assessment:   Date Status AE  Comments   Feeding 21  Mod I      Grooming 21 Mod I   Seated at sink pt brushed and styled hair          Oral Care 21 Mod I seated Seated at sink pt cleaned and applied dentures    Bathing 21  SBA  Seated & standing Bathing completed in shower    UB Dressing 21  Mod I  Donned bra and pull over shirt    LB Dressing 21 S/SBA   Pt donned underpants and pants seated. SBA to stand and pull up pants    Footwear 21 Mod I  seated  pt donned socks, L AFO brace and velcro strap shoes    Toileting 21 S/SBA  Grab bar      Homemaking 21 SBA Std.walker vs ww            Functional Transfers / Balance:   Date Status DME  Comments   Sit Balance 21  Mod I     Stand Balance 21 S/SBA  Std. Walker   Grab bar     [x] Tub      [x] [employer] 21  SBA      SBA  Std. Walker  extended tub bench     Grab bar/shower stall bench Transfer on/off extended tub bench with verbal cues for hand placement         Transfer in and out of walk shower    Commode   Transfer 21 SBA  Std. walker  Grab bar     Functional   Mobility 21  SBA  Standard walker  Throughout pt's room with standard walker    Other:  On/off firm recliner and sofa, standard queen bed and kitchen chair with no arm rests    21   SBA Std walker       Functional Exercises / Activity:   pt participated in therapeutic activity with focus on standing balance/edurance, AROM and gross/fine motor coordination to left hand. Pt completed activity at A . Pt able to stand up to 8 mins before needing a seated rest break    Sensory / Neuromuscular Re-Education:      Cognitive Skills:   Status Comments   Problem   Solving Good - Demo during functional transfers, ambulation and kitchen mobility skills    Memory good  \"   Sequencing good  \"   Safety Good - \"     Visual Perception:    Education:   pt was educated on hand placement during sit to stand transfers    [x] Family teach completed on:1/4/21 Pt's Son Kaitlin Arroyo present for family teach session. He was updated on pt's current self care levels, and types of DME for tub/shower combo. Recommend that pt have a standard walker and an extended tub bench for home setting. Pt's Son observed pt completing functional transfers and mobility at standard walker level. Son verbalized good understanding and states he will be able to assist pt at discharge as needed. Pain Level: Pt did not c/o pain during OT session    Additional Notes:   12/29/20 Dynamometer : R 40#'s , L 20#'s  Pt continues to be unable to completed 9 hole peg test with left hand                                                                          R hand 9 hole peg test 32 secs     Patient has made good  progress during treatment sessions toward set goals. Therapy emphasis to obtain goals:ADL retraining, transfers training, endurance/balance retraining, therex, homemaking & pt/family education       [x] Continue with current OT Plan of care.   [] Prepare for Discharge     DISCHARGE RECOMMENDATIONS  Recommended DME:    Post Discharge Care:   []Home Independently  []Home with 24hr Care / Supervision [x]Home with Partial Supervision []Home with Home Health OT []Home with Out Pt OT []Other: ___   Comments:         Time in Time out Tx Time Breakdown  Variance:   First Session  0800   0845 [x] Individual -45  [] Concurrent Tx -  [] Co-Tx -   [] Group Tx -   [] Time Missed -     Second Session [id number] [] Individual Tx-  [x] Concurrent Tx -45    [] Co-Tx -   [] Group Tx -   [] Time Missed -     Third Session  2017 2554 [x] Individual Tx-40    [] Concurrent Tx -  [] Co-Tx -   [] Group Tx -   [] Time Missed -         Total Tx Time: 130 mins      Marcelino Molina OTR/L [id number]

## 2021-01-04 NOTE — CARE COORDINATION
Per Dr. Cate Yung d/c 1/5. Spoke with son Claudy Barfield. He is aware of d/c. He will be in today for FT. Family will stay with patient. He states his sister's will be able to take patient to OP . They want to make their own arrangements likely at [facility]. Script will be provided. OPPT. Will order standard walker via [address]. The Plan for Transition of Care is related to the following treatment goals:home    The Patient and/or patient representative son Claudy Barfield was provided with a choice of provider and agrees   with the discharge plan. [x] Yes [] No    Freedom of choice list was provided with basic dialogue that supports the patient's individualized plan of care/goals, treatment preferences and shares the quality data associated with the providers.  [x] Yes [] No    Courtney Vera

## 2021-01-04 NOTE — PROGRESS NOTES
01/04/21 1643   Attendance   Activity Games  (Slots)   Participation Active participation   Therapeutic Recreation   Community Reintegration Demonstrates ability to complete social goals   Social Skills Cooperates with others in group activity   Leisure Education Demonstrates knowledge of benefits of leisure involvement  Raissa Curry identified concentration & used my hands as benefits)   Time Spent With Patient   Minutes 30

## 2021-01-04 NOTE — PROGRESS NOTES
Physical Therapy    Facility/Department: [address] REHAB  Daily Treatment Note    NAME: Pamela Berger  : 1955  MRN: [id number]    Date of Service: 2021  Evaluating Therapist: Tessy Shelley PT, DPT    ROOM: [facility]  DIAGNOSIS: CVA  PRECAUTIONS: falls, L hemiparesis, significant L extremity proprioceptive deficits leading to sensory ataxia, L AFO  HPI: Pt with past medical history significant for arthritis, anxiety, numbness/tingling of both legs, cancer, headaches; who presented to [facility] on 2020 with roughly 2 days of left-sided weakness.  The patient was not a candidate for TPA and/or thrombectomy. On imaging, she was found to have an acute ischemic lacunar infarct of the right thalamus. Social:  Pt lives with nephew in a 1 floor plan 1 step without rails + 4 steps with R HR to enter. Alternate entrance available with 4 GETACHEW and no HR. Her nephew works night shift and sleeps during the day. Pt's son and daughter live nearby and are willing to assist as needed. Prior to admission: Pt ambulated without AD, was functionally independent. 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 yes Yes  yes     Does pt have pain?  No c/o pain No c/o pain No c/o pain     Bed Mobility  Rolling: SBA  Supine to sit: SBA  Sit to supine: SBA  Scooting: SBA Supine>Sit Mod I NT Supervision Mod I   Transfers Sit to stand: [address] [place]  Stand to sit: ModA  Stand pivot: ModA with [employer] Sit to stand: Supervision  Stand to sit: Supervision  Stand pivot: Supervision with standard walker Sit to stand: Supervision  Stand to sit: Supervision  Stand pivot: Supervision with standard walker SBA  Supervision   Ambulation    100 feet with [employer] with Debbie/ModA   175 feet x 1 rep with standard walker with SBA   (L custom AFO) 150 feet x 1 rep with standard walker SBA (L custom flexible AFO) 200 feet with AAD with  feet with AAD with Supervision   Walking 10 feet on uneven surface 10 feet with WW with ModA NT NT 10 feet with AAD with SBA 10 feet with AAD with  Supervision   Wheel Chair Mobility NT NA NA     Car Transfers Debbie with [employer] Supervision with standard walker Supervision with standard walker SBA  Supervision   Stair negotiation: ascended and descended  8 steps with 2 rails with Debbie/ModA NT 12 steps with R HR x 1 rep with SBA (lateral technique with BUE support on rail) 12 steps with 1 rail with SBA 12 steps with 1 rail with  Supervision   Curb Step:   ascended and descended 2 inch step with [employer] and Debbie/ModA 7.5 inch curb step x 2 reps with standard walker with SBA NT 7.5 inch step with AAD and SBA 7.5 inch step with AAD and Supervision   Picking up object off the floor Picked up cone with Min assist NT NT Will  2lb weight with SB assist Will  4lb weight with Supervision   BLE ROM WNL WNL      BLE Strength RLE grossly 5/5  L hip flexion 3+/5  L knee extension 4-/5  L ankle DF 4-/5 L ankle evertors 2/5  L ankle invertors 4/5 RLE grossly 5/5  L hip flexion 4-/5  L knee extension 4-/5  L ankle DF 4-/5 L ankle evertors 4-/5  L ankle invertors 4/5      Balance  Static and dynamic standing balance Debbie/ModA with WW Static and dynamic standing balance CGA/SBA with standard walker      Date Family Teach Completed   Son present 1/4/21     Is additional Family Teaching Needed?   Y or N   N     Hindering Progress Balance, L extremity hemiparesis/coordination deficits Balance, L extremity hemiparesis/coordination deficits      PT recommended ELOS 4 weeks       Team's Discharge Plan        Therapist at Team Meeting          Therapeutic Exercise:   AM: Ambulation without AD carrying cane with  feet x 2 reps with CGA/Debbie  Weaving between 3 standing quad canes carrying ball in L hand x 6 reps without AD with CGA  PM: Floor transfer x 1 rep with SBA    Patient education  Pt educated on safe magnitude of turning walker to prevent LOB    Patient response to education:   Pt verbalized understanding Pt demonstrated skill Pt requires further education in this area   yes partial yes     Additional Comments: Pt remains limited from significant coordination deficits resulting from sensory ataxia. Pt's level of independence also limited by impaired safety with little caution exercised during turns and with obstacle negotiation despite frequent therapist direction to slow speed and decrease magnitude of movements. Pt's son present for PM session FT, completed hands on guarding/assisting patient with all mobility tasks per grid. Reviewed fall recovery in context of floor transfer without incident. All questions answered regarding progress in physical therapy. Plan for discharge tomorrow. AM  Time in: 1000  Time out: 1045    PM  Time in: 1345  Time out: 1430    Pt is making good progress toward established Physical Therapy goals. Continue with physical therapy current plan of care.     Connee Klinefelter, PT, DPT  [phone]

## 2021-01-04 NOTE — PROGRESS NOTES
WBC 5.9 12/20/2020    HGB 14.1 12/20/2020    HCT 42.5 12/20/2020    MCV 93.6 12/20/2020     12/20/2020     Lab Results   Component Value Date     12/20/2020    K 3.7 12/20/2020     12/20/2020    CO2 25 12/20/2020    BUN 17 12/20/2020    CREATININE 0.9 12/20/2020    GLUCOSE 77 12/20/2020    CALCIUM 9.0 12/20/2020    PROT 7.0 12/19/2020    LABALBU 4.3 12/19/2020    BILITOT 0.5 12/19/2020    ALKPHOS 66 12/19/2020    AST 24 12/19/2020    ALT 12 12/19/2020    LABGLOM >60 12/20/2020    GFRAA >60 12/20/2020         Lab Results   Component Value Date    ALT 12 12/19/2020    AST 24 12/19/2020    ALKPHOS 66 12/19/2020    BILITOT 0.5 12/19/2020       Functional Status  Mobility:   General Mobility: Mod I  Gait:  225 feet with SBA and custom L AFO  Transfers: SBA  Bed Mobility: Mod I    ADL's:    Feeding: Mod I  Grooming: Mod I  UB dressing: Mod I  LB dressing: SBA  UB bathing: SBA  LB bathing: SBA  Commode transfer: SBA  Functional mobility: SBA  Misc: Homemaking is SBA    Cognition: Baseline    IMPRESSION:  Cary Estrada is a 72 y.o. right-handed female with PMH significant for arthritis, anxiety, numbness/tingling of both legs, cancer, headaches; who presented to [facility] on 12[facility]19[facility] with roughly 2 days of left-sided weakness.  The patient was not a candidate for TPA and/or thrombectomy.  On imaging, she was found to have an acute ischemic lacunar infarct of the right thalamus.  Hospital course was complicated by ataxia on left, mild hyperglycemia. She was admitted in acute inpatient rehabilitation on 12/23/2020. PLAN:  I. Rehabilitation - PT, OT, Rehab Nursing    II. Medical management:  # Neuro - s/p acute ischemic lacunar infarct of the right thalamus.  - Not a candidate for tPA or thrombectomy.   - Permissive HTN with gentle return to normotensive.   - Secondary stroke prevention - treat HTN, HLD, DM   - Lipitor 40mg daily, ASA 81mg daily, Plavix 75 mg daily x21 days (EOT 1/10/2021)   - Antiplatelet/Anticoagulation therapy: ASA and Plavix (see DVT section below). - Long-term, patient will also need lifestyle modifications as well including appropriate diet and exercise, and refraining from tobacco use. Will educate on lifestyle modifications throughout rehab stay. - Spasticity: none present   - PRECAUTION: FALLS  - Post-discharge, follow with Neuro in stroke clinic and PM&R for stroke rehabilitation.    - Custom AFO is improving her ambulation, she will go home with this.      # Ortho - left hemiparesis  - WBAT   - Will monitor for shoulder pain/subluxation - no acute issues  - L ankle pain, XR done on 12/23, ortho consult done. Likely due to spasticity and hemiparesis. AFO fitting and training ongoing.      # CVS - recent CVA  - Secondary stroke prevention - treat HTN, HLD, DM                 # Pulm - at risk for atelectasis  - Continue good pulm hygiene. - Continue Zyrtec 10 mg nightly for allergies.      # GI/bowel/FEN -   - Diet: general, thin liquids. - Bowel program: Colace 100 BID, senna 2 tabs at lunch, MoM PRN  - GI prophylaxis: none  - Given high prevalence of Vitamin D deficiency in [place]: supplement with ergocalciferol 50K units/week x8 weeks. - MVI daily. # Pain control - Acetaminophen 650 mg q6H PRN for pain, oxycodone 2.5-5mg tabs q4PRN for moderate-severe pain  - Continue home meds gabapentin 300 mg TID, zanaflex 4 mg TID PRN. - Relafen and Lodine on hold for now due to history of stroke.     # DVT prophylaxis - SCDs and frequent daily ambulation.       # Skin - at risk for DTI  - maintain skin integrity. - turns q2H.     # Renal/urinary -   - Renal: No evidence of renal failure on last BMP on 12/20/2020.  - Bladder: Voiding volitionally.     # Heme - no acute issues  - Hgb stable at 14.1 on 12/20/2020.     # Endocrine - hypothyroidism  - Continue Synthroid 50 mcg daily     # ID - no acute issues     # Psych - Melatonin 3 mg QHS PRN for insomnia  - Can consider rehab psychology if issues with coping/adjustment arise. # Code status: FULL CODE     # Disposition/social - likely discharge home, will continue to discuss in weekly interdisciplinary team rounds, most recent weekly meeting on 12/29/20 with a TDD 1/5/21. # Rehab Discharge Goals: Modified independent with mobility and ADLs. # IOPC: Please see individualized plan of care from progress note dated 12/28/2020. Ashok Ramires DO  Board Certified Physical Medicine and Rehabilitation     Please note that the above documentation was prepared using voice recognition software. Every attempt was made to ensure accuracy but there may be spelling, grammatical, and contextual errors.

## 2021-01-05 VITALS
HEART RATE: 69 BPM | RESPIRATION RATE: 16 BRPM | TEMPERATURE: 98.6 F | SYSTOLIC BLOOD PRESSURE: 117 MMHG | OXYGEN SATURATION: 94 % | DIASTOLIC BLOOD PRESSURE: 71 MMHG | BODY MASS INDEX: 17.56 KG/M2 | WEIGHT: 109.25 LBS | HEIGHT: 66 IN

## 2021-01-05 PROCEDURE — 97530 THERAPEUTIC ACTIVITIES: CPT

## 2021-01-05 PROCEDURE — 6370000000 HC RX 637 (ALT 250 FOR IP): Performed by: PHYSICAL MEDICINE & REHABILITATION

## 2021-01-05 PROCEDURE — 99239 HOSP IP/OBS DSCHRG MGMT >30: CPT | Performed by: PHYSICAL MEDICINE & REHABILITATION

## 2021-01-05 PROCEDURE — 6370000000 HC RX 637 (ALT 250 FOR IP): Performed by: INTERNAL MEDICINE

## 2021-01-05 RX ORDER — ATORVASTATIN CALCIUM 40 MG/1
40 TABLET, FILM COATED ORAL NIGHTLY
Qty: 30 TABLET | Refills: 0 | Status: SHIPPED | OUTPATIENT
Start: 2021-01-05

## 2021-01-05 RX ORDER — ASPIRIN 81 MG/1
81 TABLET ORAL DAILY
Qty: 30 TABLET | Refills: 0 | Status: ON HOLD | OUTPATIENT
Start: 2021-01-06 | End: 2021-07-24 | Stop reason: HOSPADM

## 2021-01-05 RX ORDER — CLOPIDOGREL BISULFATE 75 MG/1
75 TABLET ORAL DAILY
Qty: 4 TABLET | Refills: 0 | Status: SHIPPED | OUTPATIENT
Start: 2021-01-06 | End: 2021-01-15 | Stop reason: ALTCHOICE

## 2021-01-05 RX ORDER — ERGOCALCIFEROL 1.25 MG/1
50000 CAPSULE ORAL WEEKLY
Qty: 6 CAPSULE | Refills: 0 | Status: SHIPPED | OUTPATIENT
Start: 2021-01-07

## 2021-01-05 RX ADMIN — TIZANIDINE 4 MG: 4 TABLET ORAL at 06:00

## 2021-01-05 RX ADMIN — CLOPIDOGREL 75 MG: 75 TABLET, FILM COATED ORAL at 07:51

## 2021-01-05 RX ADMIN — Medication 1 TABLET: at 07:51

## 2021-01-05 RX ADMIN — ASPIRIN 81 MG: 81 TABLET, COATED ORAL at 07:51

## 2021-01-05 RX ADMIN — GABAPENTIN 300 MG: 300 CAPSULE ORAL at 06:51

## 2021-01-05 RX ADMIN — TIZANIDINE 4 MG: 4 TABLET ORAL at 13:00

## 2021-01-05 RX ADMIN — GABAPENTIN 300 MG: 300 CAPSULE ORAL at 13:00

## 2021-01-05 RX ADMIN — LEVOTHYROXINE SODIUM 50 MCG: 0.05 TABLET ORAL at 06:51

## 2021-01-05 ASSESSMENT — PAIN SCALES - GENERAL: PAINLEVEL_OUTOF10: 0

## 2021-01-05 NOTE — CARE COORDINATION
OP OT also required - will add to Wellness Order.     Also, added ETB to DME scrip    Cherri Alvarado

## 2021-01-05 NOTE — PROGRESS NOTES
Discharge order placed by MD. Discharge instruction reviewed with pt and pt stated understanding. Aide packed belongings and escorted pt out with belongings and DME in wheelchair to private vehicle.

## 2021-01-05 NOTE — DISCHARGE SUMMARY
[facility] Physical Medicine and Rehabilitation  Discharge Summary    Date of Original Admission: 12/19/2020   Date of Rehab Admission:  12/23/2020    Date of Discharge: 1/5/2021    Referring Provider: Lex Adhikari MD  Primary Care Physician: Jackelyn Antoine DO     Attending Physician: Karan Kwan DO  Primary Service:  Acute Inpatient Rehabilitation     Primary Diagnosis: CVA  Secondary Diagnosis/es: arthritis, anxiety, numbness/tingling of both legs, cancer, headaches     Consults: Neurology and orthopedic surgery  Procedures: None  Diagnostic Tests/Significant Radiologic Results:    12/20/2020: MRI BRAIN WO CONTRAST -   Impression   1. Acute ischemic lacunar infarct in the right thalamus. 2. No intracranial hemorrhage or mass effect. 3. Mild-to-moderate chronic white matter microvascular ischemic changes.      Discharge Disposition:  Home  Condition on Discharge:  Improved  ===================================================================  Brief History of Present Illness:     Rico Pedroza is a 72 y.o. female with past medical history significant for arthritis, anxiety, numbness/tingling of both legs, cancer, headaches; who presented to [facility] on 12/19/2020 with roughly 2 days of left-sided weakness.  The patient was not a candidate for TPA and/or thrombectomy.  The patient never experienced similar symptoms in the past.  Her symptoms were constant.  On imaging, she was found to have an acute ischemic lacunar infarct of the right thalamus.  The patient was evaluated by neurology whom recommended DAPT x21 days followed by ASA 81 mg lifelong; high intensity statin; secondary stroke prophylaxis.  Hospital course was complicated by ataxia on left, mild hyperglycemia, left ankle pain (ortho consult pending).   She was admitted into acute inpatient rehabilitation on 12/23/2020.     Pertinent Admission Physical Examination:   Motor Findings  Strength: Right  Strength: Left rehabilitation program including PT, OT, SLP, RT and neuropsychology and achieved significant improvement in function as documented above. Recommended continued therapies and DME distributed are documented below. The patient will follow up with the outpatient PM&R clinics to review ongoing rehab needs. 2. Primary Medical Rehab Dx: CVA  3. Co-Morbidity #1: arthritis, anxiety, numbness/tingling of both legs, cancer, headaches   ==================================================================  Discharge Medications     Medication List      ASK your doctor about these medications    aspirin 81 MG EC tablet  Commonly known as: Ecotrin Low Strength  Take 1 tablet by mouth 2 times daily     etodolac 400 MG tablet  Commonly known as: LODINE     gabapentin 300 MG capsule  Commonly known as: NEURONTIN     levocetirizine 5 MG tablet  Commonly known as: XYZAL     levothyroxine 50 MCG tablet  Commonly known as: SYNTHROID     nabumetone 750 MG tablet  Commonly known as: RELAFEN     tiZANidine 4 MG tablet  Commonly known as: ZANAFLEX     VISION-ROLY PRESERVE PO            Allergies  Aspirin and Garlic oil    Recommended Patient Restrictions: No driving until cleared. Recommended Therapies at Discharge: Outpatient PT and OT    Medical Equipment and Adaptive Aids Dispensed: Standard walker ETB    Follow-Up Appointments  1. PCP  2. PM&R    Information provided to the patient and appropriate family members regarding discharge medications and follow up plans detailed in the DIF and given verbally to the patient. > 30 minutes spent in coordination of discharge, prescription preparation, reviewing discharge instructions and answering questions. Ashok Ramires, DO  Physical Medicine and Rehabilitation     Please note that the above documentation was prepared using voice recognition software. Every attempt was made to ensure accuracy but there may be spelling, grammatical, and contextual errors.

## 2021-01-05 NOTE — PROGRESS NOTES
Physical Therapy    Facility/Department: [address] REHAB  Discharge Summary   NAME: Darion Connell  : 1955  MRN: [id number]    Date of Service: 2021  Evaluating Therapist: Rico Ozuna PT, DPT    ROOM: [facility]  DIAGNOSIS: CVA  PRECAUTIONS: falls, L hemiparesis, significant L extremity proprioceptive deficits leading to sensory ataxia, L AFO  HPI: Pt with past medical history significant for arthritis, anxiety, numbness/tingling of both legs, cancer, headaches; who presented to [facility] on 2020 with roughly 2 days of left-sided weakness.  The patient was not a candidate for TPA and/or thrombectomy. On imaging, she was found to have an acute ischemic lacunar infarct of the right thalamus. Social:  Pt lives with nephew in a 1 floor plan 1 step without rails + 4 steps with R HR to enter. Alternate entrance available with 4 GETACHEW and no HR. Her nephew works night shift and sleeps during the day. Pt's son and daughter live nearby and are willing to assist as needed. Prior to admission: Pt ambulated without AD, was functionally independent. Pt worked as a caregiver.       Initial Evaluation  20 Discharge  21   Short Term Goals Long Term Goals    Bed Mobility  Rolling: SBA  Supine to sit: SBA  Sit to supine: SBA  Scooting: SBA Supine>Sit Mod I Supervision Mod I   Transfers Sit to stand: ModA  Stand to sit: ModA  Stand pivot: ModA with [employer] Sit to stand: Sup  Stand to sit: Sup  Stand pivot: Sup with standard walker SBA  Supervision   Ambulation    100 feet with [employer] with Debbie/ModA 200 feet with standard walker with SBA   (L custom AFO) 200 feet with AAD with  feet with AAD with Supervision   Walking 10 feet on uneven surface 10 feet with WW with ModA 10' with standard walker sba (L custom AFO) 10 feet with AAD with SBA 10 feet with AAD with  Supervision   Wheel Chair Mobility NT NA     Car Transfers Debbie with [employer] Supervision SBA  Supervision   Stair negotiation: ascended and descended  8 steps with 2 rails with Debbie/ModA 12 steps with R HR x 1 rep with SBA (lateral technique with BUE support on rail) 12 steps with 1 rail with SBA 12 steps with 1 rail with  Supervision   Curb Step:   ascended and descended 2 inch step with [employer] and Debbie/ModA 7.5 inch curb step with standard walker with SBA 7.5 inch step with AAD and SBA 7.5 inch step with AAD and Supervision   Picking up object off the floor Picked up cone with Min assist Picked up 1# with sba   at standard walker Will  2lb weight with SB assist Will  4lb weight with Supervision   BLE ROM WNL WNL     BLE Strength RLE grossly 5/5  L hip flexion 3+/5  L knee extension 4-/5  L ankle DF 4-/5 L ankle evertors 2/5  L ankle invertors 4/5 RLE grossly 5/5  L hip flexion 4-/5  L knee extension 4-/5  L ankle DF 4-/5 L ankle evertors 4-/5  L ankle invertors 4/5     Balance  Static and dynamic standing balance Debbie/ModA with WW Static and dynamic standing balance CGA/SBA with standard walker     Date Family Teach Completed  Son present 1/4/21     Is additional Family Teaching Needed? Y or N       Hindering Progress Balance, L extremity hemiparesis/coordination deficits Balance, L extremity hemiparesis/coordination deficits     PT recommended ELOS 4 weeks      Team's Discharge Plan       Therapist at Team Meeting         Pt made steady and consistent progress during POC towards all LTG's while on the ARU. Goals downgraded to supervision due to safety deficits, as well as balance deficits 2* proprioceptive/sensory deficits. Rec standard walker at this time as safest device. Rec supervision with mobility. Son attended FT and aware of recommendations and current functional mobility with hands on training completed. Rec OPPT for continued rehabilitation.      Giovanny Moreno, CHANTAL  [id number]

## 2021-01-05 NOTE — PROGRESS NOTES
Car Transfers Armida with [employer] Supervision  SBA  Supervision   Stair negotiation: ascended and descended  8 steps with 2 rails with Armida/ModA 12 steps with R HR x 1 rep with SBA (lateral technique with BUE support on rail)  12 steps with 1 rail with SBA 12 steps with 1 rail with  Supervision   Curb Step:   ascended and descended 2 inch step with [employer] and Armida/ModA 7.5 inch curb step with standard walker with SBA  7.5 inch step with AAD and SBA 7.5 inch step with AAD and Supervision   Picking up object off the floor Picked up cone with Min assist Picked up 1# with sba   at standard walker  Will  2lb weight with SB assist Will  4lb weight with Supervision   BLE ROM WNL WNL      BLE Strength RLE grossly 5/5  L hip flexion 3+/5  L knee extension 4-/5  L ankle DF 4-/5 L ankle evertors 2/5  L ankle invertors 4/5 RLE grossly 5/5  L hip flexion 4-/5  L knee extension 4-/5  L ankle DF 4-/5 L ankle evertors 4-/5  L ankle invertors 4/5      Balance  Static and dynamic standing balance Armida/ModA with WW Static and dynamic standing balance CGA/SBA with standard walker      Date Family Teach Completed  Son present 1/4/21      Is additional Family Teaching Needed?   Y or N        Hindering Progress Balance, L extremity hemiparesis/coordination deficits Balance, L extremity hemiparesis/coordination deficits      PT recommended ELOS 4 weeks       Team's Discharge Plan        Therapist at Team Meeting          Therapeutic Exercise:   AM: 5x STS transfer at w/c, no cues for sequencing  Ambulation without AD: cg/Armida 76' x2 reps with emphasize on increasing KELLY and sequencing/weight shifting     PM:    Patient education  Pt educated on safe magnitude of turning walker to prevent LOB, safety with transfers, technique with ambulation/mobility to maximize safety     Patient response to education:   Pt verbalized understanding Pt demonstrated skill Pt requires further education in this area   yes Yes with vcA yes     Additional Comments: Reviewed all mobility as per above. Occasional cues to avoid excessive stepping too far into the standard walker with improvement noted after vcA. Narrowing of KELLY and latent balance strategies noted when challenging balance without AD, with recommendation for standard walker as safest device at this time. Pt to d/c home this date. AM  Time in: 1000  Time out: 1045    PM  Time in: d/c  Time out: d/c    Pt is making good progress toward established Physical Therapy goals. Continue with physical therapy current plan of care.     Zuleyka Perez, JOANT  [id number]

## 2021-01-05 NOTE — PROGRESS NOTES
Occupational Therapy  OCCUPATIONAL THERAPY DAILY NOTE/DISCHARGE SUMMARY     Date:2021  Patient Name: Pamela Berger  MRN: [id number]  : 1955  Room: -A     Referring Practitioner: Cristian Real MD  Diagnosis: CVA  Additional Pertinent Hx: arthritis, anxiety, hx cancer  Restrictions: Fall Risk, LUE/LLE proprioceptive deficits, L ankle instabilty- improves with shoes on,     Functional Assessment:   Date Status AE  Comments   Feeding 21  Mod I      Grooming 21 Mod I            Oral Care 21 Mod I seated    Bathing 21  SBA  Seated & standing    UB Dressing 21  Mod I     LB Dressing 21 S/SBA      Footwear 21 Mod I  seated    Toileting 21 S/SBA  Grab bar      Homemaking 21 SBA Std.walker vs ww  standing at table top level folding laundry           Functional Transfers / Balance:   Date Status DME  Comments   Sit Balance 21  Mod I     Stand Balance 21 S/SBA  Std. Walker   Grab bar     [x] Tub      [x] [employer] 21  SBA      SBA  Std.  Walker  extended tub bench     Grab bar/shower stall bench    Commode   Transfer 21 SBA  Std. walker  Grab bar     Functional   Mobility 21  SBA  Standard walker     Other:  On/off firm recliner and sofa, standard queen bed and kitchen chair with no arm rests    21   SBA Std walker       Functional Exercises / Activity:   LUE ROM and strength exercises with wheel and medium resistive on table top surface   Left hand gross motor coordination with graded clothespin activity   Fine motor coordination activity to left hand with pt completing MRMT board with improved tip to tip prehension patterns noted     Sensory / Neuromuscular Re-Education:      Cognitive Skills:   Status Comments   Problem   Solving Good - Demo during functional transfers, ambulation and kitchen mobility skills    Memory good  \"   Sequencing good  \"   Safety Good - \"     Visual Perception:    Education:  Reviewed home safety with pt in preparation of being discharged home     [x] Family teach completed on:1/4/21 Pt's Son Nanette An present for family teach session. He was updated on pt's current self care levels, and types of DME for tub/shower combo. Recommend that pt have a standard walker and an extended tub bench for home setting. Pt's Son observed pt completing functional transfers and mobility at standard walker level. Son verbalized good understanding and states he will be able to assist pt at discharge as needed. Pain Level: Pt did not c/o pain during OT session    Additional Notes:   12/29/20 Dynamometer : R 40#'s , L 20#'s  Pt continues to be unable to completed 9 hole peg test with left hand                                                                          R hand 9 hole peg test 32 secs     Patient has made good  progress during treatment sessions toward set goals. Therapy emphasis to obtain goals:ADL retraining, transfers training, endurance/balance retraining, therex, homemaking & pt/family education       [] Continue with current OT Plan of care. [x] Prepare for Discharge     DISCHARGE RECOMMENDATIONS  OCCUPATIONAL THERAPY DISCHARGE SUMMARY    Discontinue Occupational Therapy intervention. Pt has:   [] met all set goals. [x] made good progress toward set goals. [] has made slow progress toward goals and would benefit from rehab setting change.  [] had a medical decline and therefore was transferred off the Rehab unit.     Long term goals  Time Frame for Long term goals : 2 weeks to address above problem areas  Long term goal 1: Pt demo Mod I to eat all meals  Long term goal 2: Pt demo MOd I grooming seated or standing  Long term goal 3: Pt demo SBA to bathe @shower level both seated & standing  Long term goal 4: Pt demo Mod I UE & LE dress & demo G- safety & insight  Long term goal 5: Pt demo Mod I commode trf ih appropiate DME to ensure pt safety  Long term goals 6: Pt demo SBA tub transfer with appropriate DME to ensure pt safety  Long term goal 7: Pt demo Mod i light homemaking task & demo G safety  Long term goal 8: Pt demo G- endurance for a 20-30 minute funcitonal activity  Long term goal 9: Pt demo improvements in her  & coordination to improved independence with ADLs & IADLs as evidenced by gains made in: dynamometer L hand 30# & norm for pt age & gender is 41#, R hand 35# & norm for pt age & gender is 50#; 9-hole peg test- L hand- Pt u/a to complete due to proprioceptive deficits.  norm is 20.3 seconds & R hand 35.1 seconds & norm for pt age & gender is 19.0 seconds    The Patient has received education on:  [x]Transfer Safety [x]Compensatory tech for ADLs [x]AE training [x]DME training [x]Energy Conservation [x]Home / Kitchen Safety  []Fall Prevention  []Other:    Family training was completed: yes    Recommendations: Outpatient OT       Recommended DME:    Post Discharge Care:   []Home Independently  []Home with 24hr Care / Supervision [x]Home with Partial Supervision []Home with Home Health OT []Home with Out Pt OT []Other: ___   Comments:         Time in Time out Tx Time Breakdown  Variance:   First Session  [phone] [x] Individual -45  [] Concurrent Tx -  [] Co-Tx -   [] Group Tx -   [] Time Missed -     Second Session    [] Individual Tx-  [] Concurrent Tx -    [] Co-Tx -   [] Group Tx -   [] Time Missed -     Third Session    [] Individual Tx-  [] Concurrent Tx -  [] Co-Tx -   [] Group Tx -   [] Time Missed -         Total Tx Time:  Rajendra OTR/L [id number]

## 2021-01-06 NOTE — PROGRESS NOTES
DISCHARGE SUMMARY    Group interaction skills/socialization:  Mahesh Rosales displayed social interaction skills at the complete independence. Leisure participation/awareness:  Mahesh Rosales participated in 1 therapeutic recreation interventions identifying 2 benefits to leisure participation.     Other:     Outcomes: goals achieved      Electronically signed by Nadia Bates on 1/6/2021 at 2:08 PM

## 2021-01-15 ENCOUNTER — OFFICE VISIT (OUTPATIENT)
Dept: NEUROLOGY | Age: 66
End: 2021-01-15
Payer: COMMERCIAL

## 2021-01-15 VITALS
TEMPERATURE: 97.8 F | DIASTOLIC BLOOD PRESSURE: 94 MMHG | OXYGEN SATURATION: 100 % | SYSTOLIC BLOOD PRESSURE: 178 MMHG | BODY MASS INDEX: 17.52 KG/M2 | HEIGHT: 66 IN | HEART RATE: 80 BPM | WEIGHT: 109 LBS

## 2021-01-15 DIAGNOSIS — I63.81 CEREBROVASCULAR ACCIDENT (CVA) DUE TO OCCLUSION OF SMALL ARTERY (HCC): Primary | ICD-10-CM

## 2021-01-15 DIAGNOSIS — F17.200 SMOKER: ICD-10-CM

## 2021-01-15 DIAGNOSIS — R29.898 LEFT LEG WEAKNESS: ICD-10-CM

## 2021-01-15 DIAGNOSIS — I10 ESSENTIAL HYPERTENSION: ICD-10-CM

## 2021-01-15 DIAGNOSIS — E78.5 DYSLIPIDEMIA, GOAL LDL BELOW 70: ICD-10-CM

## 2021-01-15 DIAGNOSIS — R20.2 PARESTHESIA OF LEFT ARM AND LEG: ICD-10-CM

## 2021-01-15 PROCEDURE — 1123F ACP DISCUSS/DSCN MKR DOCD: CPT | Performed by: NURSE PRACTITIONER

## 2021-01-15 PROCEDURE — 1111F DSCHRG MED/CURRENT MED MERGE: CPT | Performed by: NURSE PRACTITIONER

## 2021-01-15 PROCEDURE — 4004F PT TOBACCO SCREEN RCVD TLK: CPT | Performed by: NURSE PRACTITIONER

## 2021-01-15 PROCEDURE — G8400 PT W/DXA NO RESULTS DOC: HCPCS | Performed by: NURSE PRACTITIONER

## 2021-01-15 PROCEDURE — G8484 FLU IMMUNIZE NO ADMIN: HCPCS | Performed by: NURSE PRACTITIONER

## 2021-01-15 PROCEDURE — G8419 CALC BMI OUT NRM PARAM NOF/U: HCPCS | Performed by: NURSE PRACTITIONER

## 2021-01-15 PROCEDURE — 99215 OFFICE O/P EST HI 40 MIN: CPT | Performed by: NURSE PRACTITIONER

## 2021-01-15 PROCEDURE — 1090F PRES/ABSN URINE INCON ASSESS: CPT | Performed by: NURSE PRACTITIONER

## 2021-01-15 PROCEDURE — 4040F PNEUMOC VAC/ADMIN/RCVD: CPT | Performed by: NURSE PRACTITIONER

## 2021-01-15 PROCEDURE — 3017F COLORECTAL CA SCREEN DOC REV: CPT | Performed by: NURSE PRACTITIONER

## 2021-01-15 PROCEDURE — G8427 DOCREV CUR MEDS BY ELIG CLIN: HCPCS | Performed by: NURSE PRACTITIONER

## 2021-01-15 NOTE — PROGRESS NOTES
STROKE CLINIC VISIT     History of Present Illness:      Alberto Waddell is a 72 y.o. right handed female with significant past medical history of chronic peripheral neuropathy, migraines, anxiety who presents following recent hospitalization and inpatient acute rehab stay for acute ischemic stroke. She presents to clinic today with her son. She presented to the hospital on 12/19/2020 with left arm and leg weakness and sensory changes starting the previous day. MRI brain revealed acute right internal capsule infarct. US carotids without evidence of significant stenosis. She was started on DAPT and statin therapy and discharged to inpatient acute rehab on 12/23/2020. She was later discharged home on 1/5/2021. Today, she continues to note sensory changes to her left elbow and left thigh as well as weakness in her left leg. She is currently using an AFO and wheeled walker. She was to start outpatient physical and occupational therapy, however, due to a recent loss of family member she has not contacted office to start. She denies fatigue or sleep disturbances. Her son voices concerns that she has not started therapy. She is scheduled to see her primary care provider this afternoon. She remains on aspirin therapy and has since completed 21-day course of clopidogrel. She denies any side effects from medications. She has no previous history of stroke or heart disease. She is a current everyday smoker    Current Functional Status(Modified Rima Scale):   2=Slight disability; unable to carry out all previous activities, but able to look after own affairs without assistance    Review of Systems:     No chest pain, palpitations or shortness of breath  No vertigo, lightheadedness or loss of consciousness  No falls, tripping or stumbling  No incontinence of bowels or bladder  + numbness, tingling or focal arm/leg weakness  No swallowing or speech difficulties    ROS otherwise negative    Objective:     BP (!) 178/94 (Site: Right Upper Arm)   Pulse 80   Temp 97.8 °F (36.6 °C)   Ht 5' 6\" (1.676 m)   Wt 109 lb (49.4 kg)   SpO2 100%   BMI 17.59 kg/m²     General Appearance: alert, cooperative, no distress, appears stated age    Head: normocephalic, without obvious abnormality, atraumatic    Eyes: conjunctiva/corneas clear    Neck: supple, symmetrical, trachea midline, no carotid bruit    Lungs: clear to auscultation bilaterally, respirations unlabored    Heart: regular rate and rhythm, S1 and S2 normal   Extremities: normal, atraumatic, no cyanosis or edema   Pulses: 2+ and symmetric all extremities   Skin: color, texture and turgor normal, no rashes or lesions     Mental Status: alert and oriented, thought content appropriate  Speech: no dysarthria  Language: no aphasia    Cranial Nerves:  I: smell NA   II: visual acuity  NA   II: visual fields Full    II: pupils Equal and reactive    III,VII: ptosis None   III,IV,VI: extraocular muscles  Full ROM without nystagmus   V: mastication Normal   V: facial light touch sensation  Normal   V,VII: corneal reflex     VII: facial muscle function - upper  Normal   VII: facial muscle function - lower Normal   VIII: hearing Normal   IX: soft palate elevation  Normal   IX,X: gag reflex    XI: trapezius strength  5/5   XI: sternocleidomastoid strength 5/5   XI: neck extension strength  5/5   XII: tongue strength  Normal     Motor:  R 5/5, LUE 5-/5, LLE 4/5  No abnormal movements  No drift   Normal bulk and tone     Sensory:  LT intact throughout, PP decreased LUE   Vibration normal    Gait:  Normal with use of AFO and wheeled walker    DTR:   Right Brachioradialis reflex 2+  Left Brachioradialis reflex 2+  Right Biceps reflex 2+  Left Biceps reflex 2+  Right Quadriceps reflex 2+  Left Quadriceps reflex 2+  Right Achilles reflex 2+  Left Achilles reflex 2+  No Cody's  No other pathological reflexes      Laboratory/Radiology:     CBC:   Lab Results   Component Value Date    WBC 5.9 12/20/2020    RBC 4.54 12/20/2020    HGB 14.1 12/20/2020    HCT 42.5 12/20/2020    MCV 93.6 12/20/2020    MCH 31.1 12/20/2020    MCHC 33.2 12/20/2020    RDW 13.4 12/20/2020     12/20/2020    MPV 10.6 12/20/2020     CMP:    Lab Results   Component Value Date     12/20/2020    K 3.7 12/20/2020     12/20/2020    CO2 25 12/20/2020    BUN 17 12/20/2020    CREATININE 0.9 12/20/2020    GFRAA >60 12/20/2020    LABGLOM >60 12/20/2020    GLUCOSE 77 12/20/2020    GLUCOSE 93 02/20/2012    PROT 7.0 12/19/2020    LABALBU 4.3 12/19/2020    LABALBU 4.7 02/20/2012    CALCIUM 9.0 12/20/2020    BILITOT 0.5 12/19/2020    ALKPHOS 66 12/19/2020    AST 24 12/19/2020    ALT 12 12/19/2020     HgBA1c:    Lab Results   Component Value Date    LABA1C 5.4 12/20/2020     FLP:    Lab Results   Component Value Date    TRIG 139 12/20/2020    HDL 62 12/20/2020    LDLCALC 103 12/20/2020    LABVLDL 28 12/20/2020     Carotid ultrasound  Atherosclerotic disease. No hemodynamically significant stenosis is   identified   Estimated stenosis by NASCET criteria in the proximal right carotid   artery is between 0% and 49%. Estimated stenosis by NASCET criteria in the proximal left carotid   artery is between 0% and 49%. MRI brain  1. Acute ischemic lacunar infarct in the right thalamus. 2. No intracranial hemorrhage or mass effect. 3. Mild-to-moderate chronic white matter microvascular ischemic changes. * Images and labs personally reviewed at the time of this office visit    Assessment:     Acute right internal capsule infarct producing left hemibody sensory changes and left lower leg weakness. Etiology small vessel disease in setting of multiple vascular risk factors including uncontrolled hyperlipidemia and tobacco use.      Plan:     Continue aspirin daily for secondary stroke prevention    Risk factor modification  Dyslipidemia, goal LDL less than 70: recent  continue statin therapy  Tobacco use: smoking cessation

## 2021-01-26 ENCOUNTER — HOSPITAL ENCOUNTER (OUTPATIENT)
Dept: OCCUPATIONAL THERAPY | Age: 66
Setting detail: THERAPIES SERIES
Discharge: HOME | End: 2021-01-26
Payer: COMMERCIAL

## 2021-01-26 ENCOUNTER — HOSPITAL ENCOUNTER (OUTPATIENT)
Dept: PHYSICAL THERAPY | Age: 66
Setting detail: THERAPIES SERIES
Discharge: HOME | End: 2021-01-26
Payer: COMMERCIAL

## 2021-01-26 PROCEDURE — 97110 THERAPEUTIC EXERCISES: CPT | Performed by: OCCUPATIONAL THERAPIST

## 2021-01-26 PROCEDURE — 97161 PT EVAL LOW COMPLEX 20 MIN: CPT | Performed by: PHYSICAL THERAPIST

## 2021-01-26 PROCEDURE — 97165 OT EVAL LOW COMPLEX 30 MIN: CPT | Performed by: OCCUPATIONAL THERAPIST

## 2021-01-26 NOTE — PROGRESS NOTES
Fair;+     Assessment   Conditions Requiring Skilled Therapeutic Intervention  Body structures, Functions, Activity limitations: Decreased functional mobility ; Decreased strength;Decreased balance  Assessment: pt presents to PT s/p CVA. Pt demonstrates limited strength across L hip and ankle with hindered gait/balance. Pt also demonstrates limited sensation across LLE which again hinders balance.   Prognosis: Fair;Good  Decision Making: Low Complexity  REQUIRES PT FOLLOW UP: Yes     PT Education  Pt provided with Tband to perform ankle exercises to increase L ankle EV; Pt also instructed to begin seated hip flexion exercises         Plan   Plan  Times per week: 2x/week x 5 weeks  Current Treatment Recommendations: Strengthening, Balance Training, Functional Mobility Training, Gait Training, Stair training, Neuromuscular Re-education, Home Exercise Program, Safety Education & Training, Patient/Caregiver Education & Training    OutComes Score  Balance Score: 9 (01/26/21 1311)  Gait Score: 8 (01/26/21 1311)        Tinetti Total Score: 17 (01/26/21 1311)               Goals  Short term goals  Time Frame for Short term goals: 3 weeks  Short term goal 1: increase strength of L hip and L ankle EV to grossly 4-/5 allowing for improve gait mechanics  Short term goal 2: increase ambulation with pt demonstrating heel-toe progression bilaterally with use of SC x 450-500ft  Short term goal 3: increase balance to > 20/28 Tinetti to reduce the risk of falls and allowing patient to perform ADLs with Abhijit  Long term goals  Time Frame for Long term goals : 5 weeks  Long term goal 1: increase strength of L hip and L ankle EV to grossly 4 to 4+/5 allowing for improve gait mechanics  Long term goal 2: increase ambulation with pt demonstrating heel-toe gait bilaterally with/without AD x 500+ feet  Long term goal 3: increase balance to > 23/28 Tinetti to reduce the risk of falls and allowing patient to perform ADLs independently  Long term goal 4: patient demonstrates independence with HEP  Patient Goals   Patient goals : to improve overall function       Therapy Time   Individual Concurrent Group Co-treatment   Time In 1000         Time Out 1030         Minutes 30                 [place], [place]

## 2021-01-26 NOTE — PROGRESS NOTES
Mode of Transportation:      x    Leisure & Hobbies:      x    Work:       x     Comments: Pt states that she is the primary , and is requiring assistance from family for tasks such as driving and getting to the grocery store. Pt likes to himanshu in her free time but has been unable to complete due to her hand function at this time. ADL STATUS:   Ind Mod I Min A Mod A Max A Dep Other   Feeding: x         Grooming:  x        Bathing:  x        UE Dressing: x         LE Dressing:  x        Toileting: x         Transfers: x           Comments: Pt states that she is independent in all ADLs but requires adaptive equipment to sit in the shower, increased time for grooming tasks and prefers to sit down when donning LE dressing. She does not require any assistance from family to complete ADL tasks. Pain Level: pt states she is in no pain at rest or with resistive activities    UE Assessment:  ROM for BUE is [place]  MMT is 4+/5 throughout RUE but demonstrated slight weakness in shoulder flexion, elbow flexion and extension in the LUE. Comment: Hand Dominance is right    Sensation: static and dynamic light touch felt throughout each nerve distribution of the L hand with vision occluded, pt unable to identify any object place in her hand (marble, block, pom pom) with vision occluded. Pt states numbness from the elbow to the digits throughout the LUE.      Tone: normal throughout LUE  Vision / Perception: pt states no new changes-glasses worn prior to hospitalization    Dynamometer (setting 2):     Left: 45# shoulder abduction compensation while maintaining       Right: 60#    Pinch Meter:   Lateral: Left= 4#,Right= 9#   Palmar 3 point: Left= unable to complete due to postioning of L digits , Right= 4# with shoulder abduction compensation to maintain pinch  9 Hole Peg Test:   Left: 1 peg in from the towel in 1 minute    Right: 28 seconds    QuickDASH Score: 13.6% disability    Intervention: Pt performed AROM in all planes of motion with no stiffness, pain or discomfort indicated throughout all planes of motion. Pt performed stereoagnosis activity with vision occluded with a marble, cotton ball, , block and a peg-but was unable to identify any of the objects that the patient was holding and manipulating. Pt attempted fine motor tasks with L hand-picking up pegs from a towel but was unable to place any in the large peg board. Will continue to increase as tolerated. Assessment of current deficits   Functional mobility []  ADLs [] Strength [x]  Cognition []  Functional transfers  [] IADLs [x] Safety Awareness []  Endurance [x]  Fine Motor Coordination [x] Balance [] Vision/perception [] Sensation [x]   Gross Motor Coordination [x] ROM [] Pain []  Edema []      Eval Complexity: low  Profile and History- brief chart review and patient interview completed  Assessment of Occupational Performance and Identification of Deficits- 6   Clinical Decision Making- none indicated at this time    Rehab Potential:                                 [x] Good  [] Fair  [] Poor        Suggested Professional Referral:       [x] No  [] Yes:  Barriers to Goal Achievement[de-identified]          [x] No  [] Yes:  Domestic Concerns:                           [x] No  [] Yes:     Goal Formulation: Patient  Time In: 9:15a            Time Out: 9:45a                     Timed Code Treatment Minutes: 15 minutes     PLAN      Treatment to include:   [x] Instruction in HEP                   Modalities:  [x] Therapeutic Exercise        [] Ultrasound               [] Electrical Stimulation/Attended  [x] PROM/Stretching                    [x] Fluidotherapy          [x]  Paraffin                   [x] AAROM  [x] AROM                 [] Iontophoresis: 4 mg/mL;  Dexamethasone Sodium                                        [] Neuromuscular Re-education [x] Splinting         [x] Desensitization          [x] Therapeutic Activity       [] Pain Management with/without modalities PRN                 [] Manual Therapy/Fascial release                            [x] Tendon Glides        []Joint Protection/Training  []Ergonomics                             [] Joint Mobilization        [] Adaptive Equipment Assessment/Training                             [] Manual Edema Mobilization    [] Energy Conservation/Work Simplification  [x] GM/FM Coordination       [] Safety retraining/education per  individual diagnosis/goals  [] Scar Management        [] ADL/IADL re-training       Patient Specific Goal: To regain the ability to himanshu                              GOALS (Long term same as Short term):  1) Patient will demonstrate good understanding of home program (exercises/activities/diagnosis/prognosis/goals) with good accuracy. 2) Patient will demonstrate increased /pinch strength of at least 10 / 5 pinch pounds of their L hand. 3) Increase in fine motor function as evidenced by completion of 9-hole peg test and/or MRMT test with LUE  4) Patient to report 100% compliance with their splint wear, care, and precautions if needed. 5) Patient will demonstrate improved functional activity tolerance from fair to fair + for ADL/IADL completion. 6) Patient will decrease QuickDASH score to 10% or less for increased participation in daily functional activities. Patient. Education:  [x] Plans/Goals, Risks/Benefits discussed  [x] Home exercise program  Method of Education: [x] Verbal  [x] Demo  [] Written  Comprehension of Education:  [x] Verbalizes understanding. [x] Demonstrates understanding. [] Needs Review. [] Demonstrates/verbalizes understanding of HEP/Ed previously given. Patient understands diagnosis/prognosis and consents to treatment, plan and goals: [x] Yes    [] No       Electronically signed by: Amado Nelson #[id number]     [id number] Certification / Comments      Frequency/Duration 1-2 / week for 10 visits.    Certification period From: 1/26/2021  To: 3/26/2021     I have reviewed the Plan of Care established for skilled therapy services and certify that the services are required and that they will be provided while the patient is under my care. Physician's Comments/Revisions:                   Physicians's Printed Name:  Comfort Noonan DO                                 Physician's Signature:                                                               Date:       Please review Patient's OT evaluation and if you agree sign/date and fax back to us at our [address] [facility] Fax: [phone].  Thank you for your referral!

## 2021-01-29 NOTE — PROGRESS NOTES
Date:  1/29/2021          Dear  Dr. Ilda Arias  : This is to inform you that, as per [address], your patient Forlarontine Gabrielle is as of todays date being discharged from Physical Therapy secondary to the following reasons:    1. Patient contacted outpatient therapy on 1/29/21 with report that she is going to receive therapy at a different facility. If you have any questions, please feel free to call at [phone]      Thank you          Thanh Foreman DPT         The information contained in this Fax the designated recipients intend message only for the personal and confidential use named above. This information is to be handled as privileged and confidential.  If the reader of this message is not the intended recipient, you are hereby notified that you have received this document in error and that any review, dissemination, distribution or copying of this message is strictly prohibited. If you receive this communication in error, please notify us immediately at the above number and return the original to us by mail.   Thank you      [address]  T: [phone]  F: [phone]

## 2021-02-02 ENCOUNTER — HOSPITAL ENCOUNTER (OUTPATIENT)
Dept: OCCUPATIONAL THERAPY | Age: 66
Setting detail: THERAPIES SERIES
Discharge: HOME | End: 2021-02-02
Payer: COMMERCIAL

## 2021-02-02 NOTE — DISCHARGE SUMMARY
[address] [facility]                Phone: [phone]  Fax: [phone]     Occupational Therapy  Out Patient Discharge Summary     Date:  2021    Patient Name:  Natacha Spencer   :  1955 MRN: [id number]    DIAGNOSIS:  I63.9 Acute CVA      REFERRING PHYSICIAN:  Luba Hansen DO    ATTENDANCE:  Pt has attended the initial evaluation for occupational therapy services on 2021    TREATMENTS RECEIVED:  [x]? Instruction in HEP                   Modalities:  [x]?  Therapeutic Exercise                 []? Ultrasound               []? Electrical Stimulation/Attended  [x]? PROM/Stretching                    [x]? Fluidotherapy          [x]?   Paraffin                   [x]? AAROM  [x]?  AROM                 []? Iontophoresis: 4 mg/mL; Dexamethasone Sodium                                        []? Neuromuscular Re-education [x]? Splinting         [x]? Desensitization          [x]?  Therapeutic Activity                  []? Pain Management with/without modalities PRN                 []? Manual Therapy/Fascial release                                         [x]? Tendon Glides                          []?Joint Protection/Training  []? Ergonomics                             []? Joint Mobilization                      []? Adaptive Equipment Assessment/Training                             []? Manual Edema Mobilization    []? Energy Conservation/Work Simplification  [x]? GM/FM Coordination                []? Safety retraining/education per  individual diagnosis/goals  []? Scar Management                   []? ADL/IADL re-training       INITIAL STATUS:  Functional mobility []? ADLs []? Strength [x]? Cognition []? Functional transfers  []? IADLs [x]? Safety Awareness []? Endurance [x]? Fine Motor Coordination [x]? Balance []? Vision/perception []? Sensation [x]? Gross Motor Coordination [x]? ROM []? Pain []? Edema []? FINAL STATUS:  1) Patient will demonstrate good understanding of home program (exercises/activities/diagnosis/prognosis/goals) with good accuracy. Goal not met. 2) Patient will demonstrate increased /pinch strength of at least 10 / 5 pinch pounds of their L hand. Goal not met. 3) Increase in fine motor function as evidenced by completion of 9-hole peg test and/or MRMT test with LUE Goal not met. 4) Patient to report 100% compliance with their splint wear, care, and precautions if needed. Goal not met. 5) Patient will demonstrate improved functional activity tolerance from fair to fair + for ADL/IADL completion. Goal not met. 6) Patient will decrease QuickDASH score to 10% or less for increased participation in daily functional activities. Goal not met. GOALS:  0 of 6 Goals were obtained/or reached maximum potential at this time. REASON FOR DISCHARGE: Pt and family desires to attend a different facility for therapy. Pt discharged at this time. PATIENT EDUCATION/INSTRUCTIONS: Continue with HEP provided at initial evaluation. RECOMMENDATIONS: Pt to continue with HEP provided at initial evaluation, call this facility with any questions and to follow up with referring physician. Thank you for the opportunity to work with your patient. If you have questions or comments, please feel free to contact us by phone or fax. Electronically Signed by:  [address] [place] [address], OTR/L #[id number] 2/2/2021

## 2021-07-23 ENCOUNTER — APPOINTMENT (OUTPATIENT)
Dept: GENERAL RADIOLOGY | Age: 66
DRG: 482 | End: 2021-07-23
Payer: COMMERCIAL

## 2021-07-23 ENCOUNTER — HOSPITAL ENCOUNTER (INPATIENT)
Age: 66
LOS: 4 days | Discharge: HOME HEALTH | DRG: 482 | End: 2021-07-27
Attending: EMERGENCY MEDICINE
Payer: COMMERCIAL

## 2021-07-23 ENCOUNTER — APPOINTMENT (OUTPATIENT)
Dept: CT IMAGING | Age: 66
DRG: 482 | End: 2021-07-23
Payer: COMMERCIAL

## 2021-07-23 DIAGNOSIS — S72.001A CLOSED FRACTURE OF NECK OF RIGHT FEMUR, INITIAL ENCOUNTER (HCC): Primary | ICD-10-CM

## 2021-07-23 PROBLEM — S72.002A HIP FRACTURE REQUIRING OPERATIVE REPAIR, LEFT, CLOSED, INITIAL ENCOUNTER (HCC): Status: ACTIVE | Noted: 2021-07-23

## 2021-07-23 LAB
ANION GAP SERPL CALCULATED.3IONS-SCNC: 10 MMOL/L (ref 7–16)
BASOPHILS ABSOLUTE: 0.04 E9/L (ref 0–0.2)
BASOPHILS RELATIVE PERCENT: 0.6 % (ref 0–2)
BUN BLDV-MCNC: 12 MG/DL (ref 6–23)
CALCIUM SERPL-MCNC: 9.5 MG/DL (ref 8.6–10.2)
CHLORIDE BLD-SCNC: 106 MMOL/L (ref 98–107)
CO2: 26 MMOL/L (ref 22–29)
CREAT SERPL-MCNC: 0.7 MG/DL (ref 0.5–1)
EOSINOPHILS ABSOLUTE: 0.04 E9/L (ref 0.05–0.5)
EOSINOPHILS RELATIVE PERCENT: 0.6 % (ref 0–6)
GFR AFRICAN AMERICAN: >60
GFR NON-AFRICAN AMERICAN: >60 ML/MIN/1.73
GLUCOSE BLD-MCNC: 84 MG/DL (ref 74–99)
HCT VFR BLD CALC: 42.3 % (ref 34–48)
HEMOGLOBIN: 14.1 G/DL (ref 11.5–15.5)
IMMATURE GRANULOCYTES #: 0.01 E9/L
IMMATURE GRANULOCYTES %: 0.1 % (ref 0–5)
LYMPHOCYTES ABSOLUTE: 1.89 E9/L (ref 1.5–4)
LYMPHOCYTES RELATIVE PERCENT: 28.1 % (ref 20–42)
MCH RBC QN AUTO: 31 PG (ref 26–35)
MCHC RBC AUTO-ENTMCNC: 33.3 % (ref 32–34.5)
MCV RBC AUTO: 93 FL (ref 80–99.9)
MONOCYTES ABSOLUTE: 0.42 E9/L (ref 0.1–0.95)
MONOCYTES RELATIVE PERCENT: 6.3 % (ref 2–12)
NEUTROPHILS ABSOLUTE: 4.32 E9/L (ref 1.8–7.3)
NEUTROPHILS RELATIVE PERCENT: 64.3 % (ref 43–80)
PDW BLD-RTO: 14.1 FL (ref 11.5–15)
PLATELET # BLD: 274 E9/L (ref 130–450)
PMV BLD AUTO: 10.6 FL (ref 7–12)
POTASSIUM REFLEX MAGNESIUM: 3.8 MMOL/L (ref 3.5–5)
RBC # BLD: 4.55 E12/L (ref 3.5–5.5)
SODIUM BLD-SCNC: 142 MMOL/L (ref 132–146)
WBC # BLD: 6.7 E9/L (ref 4.5–11.5)

## 2021-07-23 PROCEDURE — 93005 ELECTROCARDIOGRAM TRACING: CPT | Performed by: INTERNAL MEDICINE

## 2021-07-23 PROCEDURE — 1200000000 HC SEMI PRIVATE

## 2021-07-23 PROCEDURE — 80048 BASIC METABOLIC PNL TOTAL CA: CPT

## 2021-07-23 PROCEDURE — 0QS634Z REPOSITION RIGHT UPPER FEMUR WITH INTERNAL FIXATION DEVICE, PERCUTANEOUS APPROACH: ICD-10-PCS | Performed by: ORTHOPAEDIC SURGERY

## 2021-07-23 PROCEDURE — 2580000003 HC RX 258: Performed by: INTERNAL MEDICINE

## 2021-07-23 PROCEDURE — 73502 X-RAY EXAM HIP UNI 2-3 VIEWS: CPT

## 2021-07-23 PROCEDURE — 36415 COLL VENOUS BLD VENIPUNCTURE: CPT

## 2021-07-23 PROCEDURE — 6370000000 HC RX 637 (ALT 250 FOR IP): Performed by: INTERNAL MEDICINE

## 2021-07-23 PROCEDURE — 99284 EMERGENCY DEPT VISIT MOD MDM: CPT

## 2021-07-23 PROCEDURE — 72192 CT PELVIS W/O DYE: CPT

## 2021-07-23 PROCEDURE — 99223 1ST HOSP IP/OBS HIGH 75: CPT | Performed by: INTERNAL MEDICINE

## 2021-07-23 PROCEDURE — 73552 X-RAY EXAM OF FEMUR 2/>: CPT

## 2021-07-23 PROCEDURE — 85025 COMPLETE CBC W/AUTO DIFF WBC: CPT

## 2021-07-23 RX ORDER — POLYETHYLENE GLYCOL 3350 17 G/17G
17 POWDER, FOR SOLUTION ORAL DAILY PRN
Status: DISCONTINUED | OUTPATIENT
Start: 2021-07-23 | End: 2021-07-27 | Stop reason: HOSPADM

## 2021-07-23 RX ORDER — SODIUM CHLORIDE 0.9 % (FLUSH) 0.9 %
5-40 SYRINGE (ML) INJECTION PRN
Status: DISCONTINUED | OUTPATIENT
Start: 2021-07-23 | End: 2021-07-27 | Stop reason: HOSPADM

## 2021-07-23 RX ORDER — TRAMADOL HYDROCHLORIDE 50 MG/1
50 TABLET ORAL EVERY 6 HOURS PRN
Status: DISCONTINUED | OUTPATIENT
Start: 2021-07-23 | End: 2021-07-27 | Stop reason: HOSPADM

## 2021-07-23 RX ORDER — ACETAMINOPHEN 650 MG/1
650 SUPPOSITORY RECTAL EVERY 6 HOURS PRN
Status: DISCONTINUED | OUTPATIENT
Start: 2021-07-23 | End: 2021-07-27 | Stop reason: HOSPADM

## 2021-07-23 RX ORDER — ONDANSETRON 2 MG/ML
4 INJECTION INTRAMUSCULAR; INTRAVENOUS EVERY 6 HOURS PRN
Status: DISCONTINUED | OUTPATIENT
Start: 2021-07-23 | End: 2021-07-27 | Stop reason: HOSPADM

## 2021-07-23 RX ORDER — MORPHINE SULFATE 2 MG/ML
2 INJECTION, SOLUTION INTRAMUSCULAR; INTRAVENOUS EVERY 4 HOURS PRN
Status: DISCONTINUED | OUTPATIENT
Start: 2021-07-23 | End: 2021-07-27 | Stop reason: HOSPADM

## 2021-07-23 RX ORDER — ERGOCALCIFEROL 1.25 MG/1
50000 CAPSULE ORAL WEEKLY
Status: DISCONTINUED | OUTPATIENT
Start: 2021-07-24 | End: 2021-07-27 | Stop reason: HOSPADM

## 2021-07-23 RX ORDER — ONDANSETRON 4 MG/1
4 TABLET, ORALLY DISINTEGRATING ORAL EVERY 8 HOURS PRN
Status: DISCONTINUED | OUTPATIENT
Start: 2021-07-23 | End: 2021-07-27 | Stop reason: HOSPADM

## 2021-07-23 RX ORDER — ATORVASTATIN CALCIUM 40 MG/1
40 TABLET, FILM COATED ORAL NIGHTLY
Status: DISCONTINUED | OUTPATIENT
Start: 2021-07-23 | End: 2021-07-27 | Stop reason: HOSPADM

## 2021-07-23 RX ORDER — ACETAMINOPHEN 325 MG/1
650 TABLET ORAL EVERY 6 HOURS PRN
Status: DISCONTINUED | OUTPATIENT
Start: 2021-07-23 | End: 2021-07-27 | Stop reason: HOSPADM

## 2021-07-23 RX ORDER — TRAMADOL HYDROCHLORIDE 50 MG/1
100 TABLET ORAL EVERY 6 HOURS PRN
Status: DISCONTINUED | OUTPATIENT
Start: 2021-07-23 | End: 2021-07-27 | Stop reason: HOSPADM

## 2021-07-23 RX ORDER — NICOTINE 21 MG/24HR
1 PATCH, TRANSDERMAL 24 HOURS TRANSDERMAL DAILY
Status: DISCONTINUED | OUTPATIENT
Start: 2021-07-24 | End: 2021-07-27 | Stop reason: HOSPADM

## 2021-07-23 RX ORDER — SODIUM CHLORIDE 0.9 % (FLUSH) 0.9 %
5-40 SYRINGE (ML) INJECTION EVERY 12 HOURS SCHEDULED
Status: DISCONTINUED | OUTPATIENT
Start: 2021-07-23 | End: 2021-07-27 | Stop reason: HOSPADM

## 2021-07-23 RX ORDER — TIZANIDINE 4 MG/1
4 TABLET ORAL 3 TIMES DAILY
Status: DISCONTINUED | OUTPATIENT
Start: 2021-07-23 | End: 2021-07-27 | Stop reason: HOSPADM

## 2021-07-23 RX ORDER — SODIUM CHLORIDE 9 MG/ML
25 INJECTION, SOLUTION INTRAVENOUS PRN
Status: DISCONTINUED | OUTPATIENT
Start: 2021-07-23 | End: 2021-07-27 | Stop reason: HOSPADM

## 2021-07-23 RX ORDER — LEVOTHYROXINE SODIUM 0.05 MG/1
50 TABLET ORAL DAILY
Status: DISCONTINUED | OUTPATIENT
Start: 2021-07-24 | End: 2021-07-27 | Stop reason: HOSPADM

## 2021-07-23 RX ORDER — GABAPENTIN 300 MG/1
300 CAPSULE ORAL 3 TIMES DAILY
Status: DISCONTINUED | OUTPATIENT
Start: 2021-07-23 | End: 2021-07-27 | Stop reason: HOSPADM

## 2021-07-23 RX ADMIN — TIZANIDINE 4 MG: 4 TABLET ORAL at 23:34

## 2021-07-23 RX ADMIN — ATORVASTATIN CALCIUM 40 MG: 40 TABLET, FILM COATED ORAL at 23:43

## 2021-07-23 RX ADMIN — SODIUM CHLORIDE, PRESERVATIVE FREE 10 ML: 5 INJECTION INTRAVENOUS at 23:43

## 2021-07-23 RX ADMIN — GABAPENTIN 300 MG: 300 CAPSULE ORAL at 23:34

## 2021-07-23 ASSESSMENT — ENCOUNTER SYMPTOMS
SHORTNESS OF BREATH: 0
VOMITING: 0
TROUBLE SWALLOWING: 0
PHOTOPHOBIA: 0
NAUSEA: 0
COUGH: 0
VOICE CHANGE: 0
DIARRHEA: 0
ABDOMINAL PAIN: 0

## 2021-07-23 ASSESSMENT — PAIN DESCRIPTION - LOCATION: LOCATION: HIP

## 2021-07-23 ASSESSMENT — PAIN SCALES - GENERAL: PAINLEVEL_OUTOF10: 5

## 2021-07-23 ASSESSMENT — PAIN DESCRIPTION - ORIENTATION: ORIENTATION: RIGHT

## 2021-07-23 ASSESSMENT — PAIN DESCRIPTION - PAIN TYPE: TYPE: ACUTE PAIN

## 2021-07-23 NOTE — ED NOTES
FIRST PROVIDER CONTACT ASSESSMENT NOTE      Department of Emergency Medicine   Admit Date: No admission date for patient encounter. Chief Complaint: Hip Pain (right hip pain after fall) and Fall (on 7/2, had xrays at Dr. Rober Marsh today and he states she has a femur fx )      History of Present Illness:    Adarsh Huntley is a 77 y.o. female who presents to the ED for pain to the right hip and femur area after she had a mechanical fall on July 2. She was seen at. Primary care physician office today had outpatient x-rays which were abnormal with a femur fracture on the right. She has been able to ambulate but using a walker.         -----------------END OF FIRST PROVIDER CONTACT ASSESSMENT NOTE--------------  Electronically signed by KAMI Huntley CNP   DD: 7/23/21               KAMI Zuniga CNP  07/23/21 [place]

## 2021-07-23 NOTE — ED PROVIDER NOTES
Patient is a 66-year-old female presenting to the emergency department due to femur fracture status post mechanical fall on 7/2. Patient states that she was seen by Dr. Marcy Contreras and advised to come to the emergency department due to right femur fracture found on outpatient x-ray studies. Patient denies any associated pain or other symptoms. She has some residual sensory deficits of a previous stroke 7 months ago. Patient otherwise denies any acute weakness, acute vision changes, dizziness, nausea, vomiting, fever or chills. Patient denies any loss of consciousness or head trauma at the time of her fall earlier this month. Review of Systems   Constitutional: Negative for chills and fever. HENT: Negative for trouble swallowing and voice change. Eyes: Negative for photophobia and visual disturbance. Respiratory: Negative for cough and shortness of breath. Cardiovascular: Negative for chest pain and palpitations. Gastrointestinal: Negative for abdominal pain, diarrhea, nausea and vomiting. Genitourinary: Negative for dysuria. Musculoskeletal: Negative for arthralgias. Skin: Negative for rash and wound. Neurological: Negative for dizziness and headaches. Psychiatric/Behavioral: Negative for behavioral problems and confusion. Physical Exam  Constitutional:       General: She is not in acute distress. Appearance: Normal appearance. She is not ill-appearing. HENT:      Head: Normocephalic and atraumatic. Mouth/Throat:      Mouth: Mucous membranes are moist.      Pharynx: Oropharynx is clear. Eyes:      Pupils: Pupils are equal, round, and reactive to light. Cardiovascular:      Rate and Rhythm: Normal rate and regular rhythm. Pulses: Normal pulses. Heart sounds: Normal heart sounds. Pulmonary:      Effort: Pulmonary effort is normal. No respiratory distress. Breath sounds: Normal breath sounds. No wheezing or rales.    Abdominal:      Tenderness: There is no abdominal tenderness. There is no guarding or rebound. Musculoskeletal:         General: No swelling. Cervical back: Normal range of motion and neck supple. Right lower leg: No edema. Left lower leg: No edema. Skin:     General: Skin is warm and dry. Capillary Refill: Capillary refill takes less than 2 seconds. Neurological:      General: No focal deficit present. Mental Status: She is alert and oriented to person, place, and time. Cranial Nerves: No cranial nerve deficit. Coordination: Coordination normal.      Comments: Mildly decreased sensation in the left lower extremity due to residual deficits following stroke. MDM   Patient is a 51-year-old female sent to the emergency department by her PCP due to right femur fracture found on outpatient x-ray studies. Patient had a fall on 7/2 and injured her right leg and hip. On presentation, patient denies any right hip pain or leg pain. X-rays confirmed right femoral neck fracture. Patient requires surgical intervention. Plan to discuss patient with primary care provider, Dr. Cedrick Myles, and arrange for admission for surgery. It was discussed with admitting physician and orthopedic team was consulted and will evaluate patient for surgery. Patient was agreeable with plan to admit. ED Course as of Jul 23 2013 Fri Jul 23, 2021   1817 X-ray of the right femur significant for femoral neck fracture. No hip fracture noted. [PP]      ED Course User Index  [PP] Devan Turner, DO          --------------------------------------------- PAST HISTORY ---------------------------------------------  Past Medical History:  has a past medical history of Anxiety, Arthritis, Cancer ([address] Cerebrovascular accident (CVA) due to occlusion of small artery ([address] Dyslipidemia, goal LDL below 70, Headache, and Numbness and tingling of both legs.     Past Surgical History:  has a past surgical history that includes Tonsillectomy; Appendectomy; and  section. Social History:  reports that she has been smoking cigarettes. She has been smoking about 0.50 packs per day. She has never used smokeless tobacco. She reports current alcohol use. She reports that she does not use drugs. Family History: family history is not on file. The patients home medications have been reviewed.     Allergies: Aspirin and Garlic oil    -------------------------------------------------- RESULTS -------------------------------------------------    LABS:  Results for orders placed or performed during the hospital encounter of    Basic Metabolic Panel w/ Reflex to MG   Result Value Ref Range    Sodium 142 132 - 146 mmol/L    Potassium reflex Magnesium 3.8 3.5 - 5.0 mmol/L    Chloride 106 98 - 107 mmol/L    CO2 26 22 - 29 mmol/L    Anion Gap 10 7 - 16 mmol/L    Glucose 84 74 - 99 mg/dL    BUN 12 6 - 23 mg/dL    CREATININE 0.7 0.5 - 1.0 mg/dL    GFR Non-African American >60 >=60 mL/min/1.73    GFR African American >60     Calcium 9.5 8.6 - 10.2 mg/dL   CBC auto differential   Result Value Ref Range    WBC 6.7 4.5 - 11.5 E9/L    RBC 4.55 3.50 - 5.50 E12/L    Hemoglobin 14.1 11.5 - 15.5 g/dL    Hematocrit 42.3 34.0 - 48.0 %    MCV 93.0 80.0 - 99.9 fL    MCH 31.0 26.0 - 35.0 pg    MCHC 33.3 32.0 - 34.5 %    RDW 14.1 11.5 - 15.0 fL    Platelets 450 034 - 361 E9/L    MPV 10.6 7.0 - 12.0 fL    Neutrophils % 64.3 43.0 - 80.0 %    Immature Granulocytes % 0.1 0.0 - 5.0 %    Lymphocytes % 28.1 20.0 - 42.0 %    Monocytes % 6.3 2.0 - 12.0 %    Eosinophils % 0.6 0.0 - 6.0 %    Basophils % 0.6 0.0 - 2.0 %    Neutrophils Absolute 4.32 1.80 - 7.30 E9/L    Immature Granulocytes # 0.01 E9/L    Lymphocytes Absolute 1.89 1.50 - 4.00 E9/L    Monocytes Absolute 0.42 0.10 - 0.95 E9/L    Eosinophils Absolute 0.04 (L) 0.05 - 0.50 E9/L    Basophils Absolute 0.04 0.00 - 0.20 E9/L       RADIOLOGY:  XR FEMUR RIGHT (MIN 2 VIEWS)    Result Date: 7/23/2021  EXAMINATION: XRAY VIEWS OF THE RIGHT FEMUR 7/23/2021 5:07 pm COMPARISON: None. HISTORY: ORDERING SYSTEM PROVIDED HISTORY: Pain TECHNOLOGIST PROVIDED HISTORY: Reason for exam:->Pain FINDINGS: There is a right femoral neck fracture with mild impaction. The distal femur is intact. Visualized aspects of the knee demonstrate no acute abnormality. Femoral neck fracture. XR HIP 2-3 VW W PELVIS RIGHT    Result Date: 7/23/2021  EXAMINATION: ONE XRAY VIEW OF THE PELVIS AND TWO XRAY VIEWS RIGHT HIP 7/23/2021 5:07 pm COMPARISON: None. HISTORY: ORDERING SYSTEM PROVIDED HISTORY: pain fall 7/2 TECHNOLOGIST PROVIDED HISTORY: Reason for exam:->pain fall 7/2 FINDINGS: There is deformity of the right femoral neck which is is consistent with a right femoral neck fracture. There is mild impaction. There is no dislocation. Right hip joint space is mildly narrowed. There is mild marginal spurring. No other fracture seen. There are minimal degenerative changes involving the left hip. Right femoral neck fracture.       ------------------------- NURSING NOTES AND VITALS REVIEWED ---------------------------  Date / Time Roomed:  7/23/2021  4:53 PM  ED Bed Assignment:  04/04    The nursing notes within the ED encounter and vital signs as below have been reviewed. Patient Vitals for the past 24 hrs:   BP Temp Temp src Pulse Resp SpO2 Height Weight   07/23/21 1638 (!) 168/78 98.2 °F (36.8 °C) Oral 82 16 98 % 5' 6\" (1.676 m) 99 lb (44.9 kg)       Oxygen Saturation Interpretation: Normal    ------------------------------------------ PROGRESS NOTES ------------------------------------------    Counseling:  I have spoken with the patient and discussed todays results, in addition to providing specific details for the plan of care and counseling regarding the diagnosis and prognosis.   Their questions are answered at this time and they are agreeable with the plan of admission.    --------------------------------- ADDITIONAL PROVIDER NOTES ---------------------------------    This patient's ED course included: a personal history and physicial examination and re-evaluation prior to disposition    This patient has remained hemodynamically stable during their ED course. Diagnosis:  1. Closed fracture of neck of right femur, initial encounter [facility]        Disposition:  Patient's disposition: Admit to med/surg floor  Patient's condition is stable. NOTE: This report was transcribed using voice recognition software.  Every effort was made to ensure accuracy; however, inadvertent computerized transcription errors may be present           DO Heidi Valencia  07/23/21 2013       DO Heidi Valencia  07/23/21 2013

## 2021-07-24 ENCOUNTER — APPOINTMENT (OUTPATIENT)
Dept: GENERAL RADIOLOGY | Age: 66
DRG: 482 | End: 2021-07-24
Payer: COMMERCIAL

## 2021-07-24 ENCOUNTER — ANESTHESIA EVENT (OUTPATIENT)
Dept: OPERATING ROOM | Age: 66
DRG: 482 | End: 2021-07-24
Payer: COMMERCIAL

## 2021-07-24 ENCOUNTER — ANESTHESIA (OUTPATIENT)
Dept: OPERATING ROOM | Age: 66
DRG: 482 | End: 2021-07-24
Payer: COMMERCIAL

## 2021-07-24 VITALS — OXYGEN SATURATION: 100 % | SYSTOLIC BLOOD PRESSURE: 116 MMHG | DIASTOLIC BLOOD PRESSURE: 71 MMHG

## 2021-07-24 PROBLEM — S72.001A FRACTURE OF UNSPECIFIED PART OF NECK OF RIGHT FEMUR, INITIAL ENCOUNTER FOR CLOSED FRACTURE (HCC): Status: ACTIVE | Noted: 2021-07-24

## 2021-07-24 PROCEDURE — 6370000000 HC RX 637 (ALT 250 FOR IP): Performed by: ORTHOPAEDIC SURGERY

## 2021-07-24 PROCEDURE — 2500000003 HC RX 250 WO HCPCS: Performed by: ORTHOPAEDIC SURGERY

## 2021-07-24 PROCEDURE — 6360000002 HC RX W HCPCS: Performed by: ORTHOPAEDIC SURGERY

## 2021-07-24 PROCEDURE — 99232 SBSQ HOSP IP/OBS MODERATE 35: CPT | Performed by: INTERNAL MEDICINE

## 2021-07-24 PROCEDURE — 2580000003 HC RX 258: Performed by: NURSE ANESTHETIST, CERTIFIED REGISTERED

## 2021-07-24 PROCEDURE — 6360000002 HC RX W HCPCS: Performed by: NURSE ANESTHETIST, CERTIFIED REGISTERED

## 2021-07-24 PROCEDURE — 87081 CULTURE SCREEN ONLY: CPT

## 2021-07-24 PROCEDURE — 1200000000 HC SEMI PRIVATE

## 2021-07-24 PROCEDURE — 3700000000 HC ANESTHESIA ATTENDED CARE: Performed by: ORTHOPAEDIC SURGERY

## 2021-07-24 PROCEDURE — 3600000013 HC SURGERY LEVEL 3 ADDTL 15MIN: Performed by: ORTHOPAEDIC SURGERY

## 2021-07-24 PROCEDURE — 3600000003 HC SURGERY LEVEL 3 BASE: Performed by: ORTHOPAEDIC SURGERY

## 2021-07-24 PROCEDURE — C1713 ANCHOR/SCREW BN/BN,TIS/BN: HCPCS | Performed by: ORTHOPAEDIC SURGERY

## 2021-07-24 PROCEDURE — 7100000001 HC PACU RECOVERY - ADDTL 15 MIN: Performed by: ORTHOPAEDIC SURGERY

## 2021-07-24 PROCEDURE — 2500000003 HC RX 250 WO HCPCS: Performed by: NURSE ANESTHETIST, CERTIFIED REGISTERED

## 2021-07-24 PROCEDURE — 2720000010 HC SURG SUPPLY STERILE: Performed by: ORTHOPAEDIC SURGERY

## 2021-07-24 PROCEDURE — 7100000000 HC PACU RECOVERY - FIRST 15 MIN: Performed by: ORTHOPAEDIC SURGERY

## 2021-07-24 PROCEDURE — 2709999900 HC NON-CHARGEABLE SUPPLY: Performed by: ORTHOPAEDIC SURGERY

## 2021-07-24 PROCEDURE — 3209999900 FLUORO FOR SURGICAL PROCEDURES

## 2021-07-24 PROCEDURE — 2580000003 HC RX 258: Performed by: ORTHOPAEDIC SURGERY

## 2021-07-24 PROCEDURE — C1769 GUIDE WIRE: HCPCS | Performed by: ORTHOPAEDIC SURGERY

## 2021-07-24 PROCEDURE — 6360000002 HC RX W HCPCS: Performed by: INTERNAL MEDICINE

## 2021-07-24 PROCEDURE — 3700000001 HC ADD 15 MINUTES (ANESTHESIA): Performed by: ORTHOPAEDIC SURGERY

## 2021-07-24 DEVICE — CANNULATED SCREW
Type: IMPLANTABLE DEVICE | Site: HIP | Status: FUNCTIONAL
Brand: ASNIS

## 2021-07-24 DEVICE — WASHER: Type: IMPLANTABLE DEVICE | Site: HIP | Status: FUNCTIONAL

## 2021-07-24 RX ORDER — SODIUM CHLORIDE 0.9 % (FLUSH) 0.9 %
10 SYRINGE (ML) INJECTION PRN
Status: DISCONTINUED | OUTPATIENT
Start: 2021-07-24 | End: 2021-07-27 | Stop reason: HOSPADM

## 2021-07-24 RX ORDER — ACETAMINOPHEN 325 MG/1
650 TABLET ORAL EVERY 4 HOURS PRN
Status: DISCONTINUED | OUTPATIENT
Start: 2021-07-24 | End: 2021-07-27 | Stop reason: HOSPADM

## 2021-07-24 RX ORDER — GLYCOPYRROLATE 1 MG/5 ML
SYRINGE (ML) INTRAVENOUS PRN
Status: DISCONTINUED | OUTPATIENT
Start: 2021-07-24 | End: 2021-07-24 | Stop reason: SDUPTHER

## 2021-07-24 RX ORDER — LIDOCAINE HYDROCHLORIDE 20 MG/ML
INJECTION, SOLUTION EPIDURAL; INFILTRATION; INTRACAUDAL; PERINEURAL PRN
Status: DISCONTINUED | OUTPATIENT
Start: 2021-07-24 | End: 2021-07-24 | Stop reason: SDUPTHER

## 2021-07-24 RX ORDER — FENTANYL CITRATE 50 UG/ML
INJECTION, SOLUTION INTRAMUSCULAR; INTRAVENOUS PRN
Status: DISCONTINUED | OUTPATIENT
Start: 2021-07-24 | End: 2021-07-24 | Stop reason: SDUPTHER

## 2021-07-24 RX ORDER — ONDANSETRON 2 MG/ML
INJECTION INTRAMUSCULAR; INTRAVENOUS PRN
Status: DISCONTINUED | OUTPATIENT
Start: 2021-07-24 | End: 2021-07-24 | Stop reason: SDUPTHER

## 2021-07-24 RX ORDER — OXYCODONE HYDROCHLORIDE 5 MG/1
5 TABLET ORAL EVERY 4 HOURS PRN
Status: DISCONTINUED | OUTPATIENT
Start: 2021-07-24 | End: 2021-07-27 | Stop reason: HOSPADM

## 2021-07-24 RX ORDER — ASPIRIN 325 MG
325 TABLET, DELAYED RELEASE (ENTERIC COATED) ORAL DAILY
Qty: 30 TABLET | Refills: 0 | Status: SHIPPED | OUTPATIENT
Start: 2021-07-24 | End: 2021-08-23

## 2021-07-24 RX ORDER — PROPOFOL 10 MG/ML
INJECTION, EMULSION INTRAVENOUS PRN
Status: DISCONTINUED | OUTPATIENT
Start: 2021-07-24 | End: 2021-07-24 | Stop reason: SDUPTHER

## 2021-07-24 RX ORDER — FENTANYL CITRATE 50 UG/ML
25 INJECTION, SOLUTION INTRAMUSCULAR; INTRAVENOUS EVERY 5 MIN PRN
Status: DISCONTINUED | OUTPATIENT
Start: 2021-07-24 | End: 2021-07-24 | Stop reason: HOSPADM

## 2021-07-24 RX ORDER — SODIUM CHLORIDE, SODIUM LACTATE, POTASSIUM CHLORIDE, CALCIUM CHLORIDE 600; 310; 30; 20 MG/100ML; MG/100ML; MG/100ML; MG/100ML
INJECTION, SOLUTION INTRAVENOUS CONTINUOUS PRN
Status: DISCONTINUED | OUTPATIENT
Start: 2021-07-24 | End: 2021-07-24 | Stop reason: SDUPTHER

## 2021-07-24 RX ORDER — SODIUM CHLORIDE 0.9 % (FLUSH) 0.9 %
10 SYRINGE (ML) INJECTION EVERY 12 HOURS SCHEDULED
Status: DISCONTINUED | OUTPATIENT
Start: 2021-07-24 | End: 2021-07-27 | Stop reason: HOSPADM

## 2021-07-24 RX ORDER — NEOSTIGMINE METHYLSULFATE 1 MG/ML
INJECTION, SOLUTION INTRAVENOUS PRN
Status: DISCONTINUED | OUTPATIENT
Start: 2021-07-24 | End: 2021-07-24 | Stop reason: SDUPTHER

## 2021-07-24 RX ORDER — BUPIVACAINE HYDROCHLORIDE 5 MG/ML
INJECTION, SOLUTION EPIDURAL; INTRACAUDAL PRN
Status: DISCONTINUED | OUTPATIENT
Start: 2021-07-24 | End: 2021-07-24 | Stop reason: ALTCHOICE

## 2021-07-24 RX ORDER — SODIUM CHLORIDE 9 MG/ML
INJECTION, SOLUTION INTRAVENOUS CONTINUOUS
Status: DISCONTINUED | OUTPATIENT
Start: 2021-07-24 | End: 2021-07-27 | Stop reason: HOSPADM

## 2021-07-24 RX ORDER — PHENYLEPHRINE HCL IN 0.9% NACL 1 MG/10 ML
SYRINGE (ML) INTRAVENOUS PRN
Status: DISCONTINUED | OUTPATIENT
Start: 2021-07-24 | End: 2021-07-24 | Stop reason: SDUPTHER

## 2021-07-24 RX ORDER — HYDROCODONE BITARTRATE AND ACETAMINOPHEN 5; 325 MG/1; MG/1
1 TABLET ORAL EVERY 4 HOURS PRN
Qty: 42 TABLET | Refills: 0 | Status: SHIPPED | OUTPATIENT
Start: 2021-07-24 | End: 2021-07-31

## 2021-07-24 RX ORDER — SODIUM CHLORIDE 9 MG/ML
25 INJECTION, SOLUTION INTRAVENOUS PRN
Status: DISCONTINUED | OUTPATIENT
Start: 2021-07-24 | End: 2021-07-27 | Stop reason: HOSPADM

## 2021-07-24 RX ORDER — ROCURONIUM BROMIDE 10 MG/ML
INJECTION, SOLUTION INTRAVENOUS PRN
Status: DISCONTINUED | OUTPATIENT
Start: 2021-07-24 | End: 2021-07-24 | Stop reason: SDUPTHER

## 2021-07-24 RX ADMIN — TIZANIDINE 4 MG: 4 TABLET ORAL at 14:17

## 2021-07-24 RX ADMIN — FENTANYL CITRATE 50 MCG: 50 INJECTION, SOLUTION INTRAMUSCULAR; INTRAVENOUS at 10:31

## 2021-07-24 RX ADMIN — ROCURONIUM BROMIDE 25 MG: 10 INJECTION, SOLUTION INTRAVENOUS at 10:31

## 2021-07-24 RX ADMIN — Medication 2 MG: at 11:25

## 2021-07-24 RX ADMIN — Medication 100 MCG: at 10:40

## 2021-07-24 RX ADMIN — GABAPENTIN 300 MG: 300 CAPSULE ORAL at 14:17

## 2021-07-24 RX ADMIN — ENOXAPARIN SODIUM 40 MG: 40 INJECTION SUBCUTANEOUS at 14:18

## 2021-07-24 RX ADMIN — GABAPENTIN 300 MG: 300 CAPSULE ORAL at 20:37

## 2021-07-24 RX ADMIN — PROPOFOL 150 MG: 10 INJECTION, EMULSION INTRAVENOUS at 10:31

## 2021-07-24 RX ADMIN — WATER 1000 MG: 1 INJECTION INTRAMUSCULAR; INTRAVENOUS; SUBCUTANEOUS at 18:05

## 2021-07-24 RX ADMIN — TIZANIDINE 4 MG: 4 TABLET ORAL at 20:37

## 2021-07-24 RX ADMIN — SODIUM CHLORIDE, POTASSIUM CHLORIDE, SODIUM LACTATE AND CALCIUM CHLORIDE: 600; 310; 30; 20 INJECTION, SOLUTION INTRAVENOUS at 10:15

## 2021-07-24 RX ADMIN — Medication 2000 MG: at 10:21

## 2021-07-24 RX ADMIN — ATORVASTATIN CALCIUM 40 MG: 40 TABLET, FILM COATED ORAL at 20:37

## 2021-07-24 RX ADMIN — SODIUM CHLORIDE: 9 INJECTION, SOLUTION INTRAVENOUS at 14:19

## 2021-07-24 RX ADMIN — LIDOCAINE HYDROCHLORIDE 50 MG: 20 INJECTION, SOLUTION EPIDURAL; INFILTRATION; INTRACAUDAL; PERINEURAL at 10:31

## 2021-07-24 RX ADMIN — ACETAMINOPHEN 650 MG: 325 TABLET ORAL at 14:17

## 2021-07-24 RX ADMIN — ONDANSETRON 4 MG: 2 INJECTION INTRAMUSCULAR; INTRAVENOUS at 10:31

## 2021-07-24 RX ADMIN — FENTANYL CITRATE 50 MCG: 50 INJECTION, SOLUTION INTRAMUSCULAR; INTRAVENOUS at 11:26

## 2021-07-24 RX ADMIN — MORPHINE SULFATE 2 MG: 2 INJECTION, SOLUTION INTRAMUSCULAR; INTRAVENOUS at 05:58

## 2021-07-24 RX ADMIN — TRAMADOL HYDROCHLORIDE 100 MG: 50 TABLET, FILM COATED ORAL at 14:17

## 2021-07-24 RX ADMIN — Medication 0.4 MG: at 11:25

## 2021-07-24 ASSESSMENT — PULMONARY FUNCTION TESTS
PIF_VALUE: 13
PIF_VALUE: 3
PIF_VALUE: 13
PIF_VALUE: 3
PIF_VALUE: 13
PIF_VALUE: 3
PIF_VALUE: 5
PIF_VALUE: 0
PIF_VALUE: 13
PIF_VALUE: 12
PIF_VALUE: 13
PIF_VALUE: 13
PIF_VALUE: 23
PIF_VALUE: 13
PIF_VALUE: 12
PIF_VALUE: 13
PIF_VALUE: 1
PIF_VALUE: 12
PIF_VALUE: 13
PIF_VALUE: 12
PIF_VALUE: 13
PIF_VALUE: 14
PIF_VALUE: 13
PIF_VALUE: 13
PIF_VALUE: 0
PIF_VALUE: 13
PIF_VALUE: 23
PIF_VALUE: 13
PIF_VALUE: 3
PIF_VALUE: 13
PIF_VALUE: 27
PIF_VALUE: 0
PIF_VALUE: 13

## 2021-07-24 ASSESSMENT — PAIN SCALES - GENERAL
PAINLEVEL_OUTOF10: 3
PAINLEVEL_OUTOF10: 0
PAINLEVEL_OUTOF10: 7

## 2021-07-24 ASSESSMENT — PAIN DESCRIPTION - PAIN TYPE: TYPE: SURGICAL PAIN

## 2021-07-24 ASSESSMENT — LIFESTYLE VARIABLES: SMOKING_STATUS: 1

## 2021-07-24 NOTE — PROGRESS NOTES
Patient alert and oriented X4. Patient danged her feet out of bed. Activly using incentive spirometer and deep breathing. Patients dressing clean dry and intact.

## 2021-07-24 NOTE — ANESTHESIA POSTPROCEDURE EVALUATION
Department of Anesthesiology  Postprocedure Note    Patient: Lor Fine  MRN: [id number]  [place]: 1955  Date of evaluation: 7/24/2021  Time:  7:11 PM     Procedure Summary     Date: 07/24/21 Room / Location: [facility] OR 01 / [facility]    Anesthesia Start: 1021 Anesthesia Stop: 4942    Procedure: RIGHT HIP CLOSED REDUCTION PERCUTANEOUS PINNING (Right ) Diagnosis: (RIGHT HIP FRACTURE)    Surgeons: Bety Rosado MD Responsible Provider: Keeley Hoskins MD    Anesthesia Type: general, spinal ASA Status: 3          Anesthesia Type: general, spinal    Randall Phase I: Randall Score: 10    Randall Phase II:      Last vitals: Reviewed and per EMR flowsheets.        Anesthesia Post Evaluation    Patient location during evaluation: PACU  Level of consciousness: awake  Airway patency: patent  Nausea & Vomiting: no nausea and no vomiting  Complications: no  Cardiovascular status: hemodynamically stable  Respiratory status: acceptable

## 2021-07-24 NOTE — OP NOTE
[address] [address]                                OPERATIVE REPORT    PATIENT NAME: Beryl Kemp                    :        1955  MED REC NO:   [id number]                            ROOM:       4653  ACCOUNT NO:   [de-identified]                           ADMIT DATE: 2021  PROVIDER:     Desmond Lennox    DATE OF PROCEDURE:  2021    PREOPERATIVE DIAGNOSIS:  Right hip femoral neck fracture. POSTOPERATIVE DIAGNOSIS:  Right hip femoral neck fracture. PROCEDURE PERFORMED:  Right hip femoral neck fracture closed reduction  and percutaneous pinning. SURGEON:  Dr. Desmond Lennox. COMPLICATIONS:  None. ANTIBIOTICS:  Ancef. ANESTHESIA:  General plus local.    ESTIMATED BLOOD LOSS:  50 mL. EQUIPMENT USED:  [place] cannulated screw set; 6.5-mm screws x3. INDICATIONS:  This is a 66-year-old female diagnosed with right hip  femoral neck fracture based on history and physical, x-ray, and CAT  scan. Her fracture was minimally displaced. After discussing risks and  benefits, the patient wished to undergo procedure as listed above. DESCRIPTION OF PROCEDURE:  The patient was brought to the operating room  on her cart. She underwent general anesthesia. She was transferred to  the operating room table. All extremities were well padded. Right leg  was placed in traction. She was prepped and draped in the sterile  fashion using Betadine and ChloraPrep. Time-out was performed. Skin  was incised with a knife about the lateral aspect of the hip. Subcutaneous tissues were incised with a knife. The lateral aspect of  the femur was identified. Guide pin was then placed. This was the  inferior guidewire. This was placed at about the superior aspect of the  lesser trochanter into the inferior aspect of the femoral head. AP and  lateral views were identified, did not penetrate the joint. Next, an  anterior guidewire was placed in a similar fashion and then a posterior  guidewire was placed in a similar fashion. This was an inverted  triangle configuration. These were placed across the femoral neck  fracture. This was then measured. Katrin Manuel was used for the lateral  cortex for all three screws. A 75-mm anterior screw was placed, this  was 6.5 mm in diameter. Next, the posterior screw was placed and  finally the inferior screw was placed. Three washers were used. Good  bite was noted within the femoral head. Final images were taken  demonstrating good reduction of the fracture site as well as good  placement of the hardware. The wound underwent copious irrigation with  saline. The IT band was closed with 2-0 Vicryl and skin was closed with  2-0 Vicryl and staples. Dressings used were Xeroform, 4x4s, and Island  dressing. The patient underwent local anesthesia. She was then awoken  and brought to the PACU in good condition. POSTOPERATIVE PLAN:  The patient will follow up with Dr. Slade Shore in two  weeks. She will be nonweightbearing of right hip. She will be given  pain control. She will be on antibiotics for one day and she will be on  DVT prophylaxis. She will be readmitted to Medicine. Any questions,  the patient can feel free to call the office.         Shun Guzmán    D: 07/24/2021 11:35:51       T: 07/24/2021 13:46:38     [email or web address]  Job#: [id number]     Doc#: [id number]    CC:

## 2021-07-24 NOTE — PROGRESS NOTES
Comprehensive Nutrition Assessment    Type and Reason for Visit:  Initial, Positive Nutrition Screen    Nutrition Recommendations/Plan: Continue current diet and ONS to help with wound healing and optimizing nutrient needs. Nutrition Assessment:  PMHx of hypothyroidism, CVA who fell on 7/2/2021 after she tripped over a fan. Pt adm with R hip fracture and had surgry today. Pt at risk d/f hip fracture and will start ONS to help with healing and optimizing nutrient needs. Malnutrition Assessment:  Malnutrition Status:  No malnutrition    Context:  Acute Illness     Findings of the 6 clinical characteristics of malnutrition:  Energy Intake:  No significant decrease in energy intake  Weight Loss:  No significant weight loss     Body Fat Loss:  No significant body fat loss     Muscle Mass Loss:  No significant muscle mass loss    Fluid Accumulation:  No significant fluid accumulation     Strength:  Not Performed    Estimated Daily Nutrient Needs:  Energy (kcal):   (MSJx1. 2SF); Weight Used for Energy Requirements:  Current     Protein (g):  70-80g (1.3-1.5g/kg CBW); Weight Used for Protein Requirements:  Current        Fluid (ml/day):  ; Method Used for Fluid Requirements:  1 ml/kcal      Nutrition Related Findings:  A&Ox4, BS active, Abd WDL, -I/Os, no Edema      Wounds:  Surgical Incision       Current Nutrition Therapies:    ADULT DIET;  Regular    Anthropometric Measures:  · Height: 5' 6\" (167.6 cm)  · Current Body Weight: 115 lb (52.2 kg) (7/24)   · Admission Body Weight:      · Usual Body Weight: 115 lb 8 oz (52.4 kg) (12/19/20)     · Ideal Body Weight: 130 lbs; % Ideal Body Weight 88.5 %   · BMI: 18.6  · Adjusted Body Weight:  ; No Adjustment   · Adjusted BMI:      · BMI Categories: Underweight (BMI less than 22) age over 72       Nutrition Diagnosis:   · Increased nutrient needs related to acute injury/trauma (R hip fracture had surgery today) as evidenced by intake 26-50%    Nutrition Interventions:   Food and/or Nutrient Delivery:  Continue Current Diet, Start Oral Nutrition Supplement (William BID and Ensure Enlive BID)  Nutrition Education/Counseling:  Education initiated (discussed importance of ONS)   Coordination of Nutrition Care:  Continue to monitor while inpatient    Goals:  >50% of meals and ONS consumed       Nutrition Monitoring and Evaluation:   Behavioral-Environmental Outcomes:  None Identified   Food/Nutrient Intake Outcomes:  Food and Nutrient Intake, Supplement Intake  Physical Signs/Symptoms Outcomes:  Biochemical Data, Fluid Status or Edema, Nutrition Focused Physical Findings, Skin, Weight     Discharge Planning:     Too soon to determine     Electronically signed by Douglas Mc RD, LD on 7/24/21 at 2:44 PM EDT    Contact: [phone]

## 2021-07-24 NOTE — H&P
[facility] History and Physical      CHIEF COMPLAINT:   Right hip pain    History of Present Illness:    63-year-old woman with history of hypothyroidism, CVA who fell on 7/2/2021 after she tripped over a fan. Since then has had right hip pain off-and-on. It was severe yesterday. She has been ambulating with a walker. Today she saw her primary care doctor Dr. Winnie López who took x-rays and then sent her to the ED for further evaluation. X-ray showed right femoral neck fracture. She denies chest pain, shortness of breath, dizziness, nausea, vomiting, abdominal pain, diarrhea. She usually ambulates with a walker because of old CVA. Informant(s) for H&P: Patient    REVIEW OF SYSTEMS:  A comprehensive review of systems was negative except for: what is in the HPI      PMH:  Past Medical History:   Diagnosis Date    Anxiety     Arthritis     [facility] ([facility] [address]     Cerebrovascular accident (CVA) due to occlusion of small artery ([facility] [address] 12/19/2020    Dyslipidemia, goal LDL below 70     Headache     3-6 per month; 2/10 on the pain scale    Numbness and tingling of both legs        Surgical History:  Past Surgical History:   Procedure Laterality Date   [address]      x 3    TONSILLECTOMY         Medications Prior to Admission:    Prior to Admission medications    Medication Sig Start Date End Date Taking?  Authorizing Provider   aspirin (ECOTRIN LOW STRENGTH) 81 MG EC tablet Take 1 tablet by mouth daily 1/6/21   Olalla L Belcik, DO   atorvastatin (LIPITOR) 40 MG tablet Take 1 tablet by mouth nightly 1/5/21   Olalla L Belcik, DO   vitamin D (ERGOCALCIFEROL) 1.25 MG (83452 UT) CAPS capsule Take 1 capsule by mouth once a week 1/7/21   Olalla L Belcik, DO   tiZANidine (ZANAFLEX) 4 MG tablet Take 4 mg by mouth 3 times daily    Historical Provider, MD mayerstone (RELAFEN) 750 MG tablet Take 750 mg by mouth 2 times daily Historical Provider, MD   levothyroxine (SYNTHROID) 50 MCG tablet Take 50 mcg by mouth Daily    Historical Provider, MD   levocetirizine (XYZAL) 5 MG tablet Take 5 mg by mouth nightly    Historical Provider, MD   gabapentin (NEURONTIN) 300 MG capsule Take 300 mg by mouth 3 times daily    Historical Provider, MD   etodolac (LODINE) 400 MG tablet Take 400 mg by mouth daily    Historical Provider, MD   Multiple Vitamins-Minerals (VISION-ROLY PRESERVE PO) Take 2 tablets by mouth daily    Historical Provider, MD       Allergies:    Aspirin and Garlic oil    Social History:    reports that she has been smoking cigarettes. She has been smoking about 0.50 packs per day. She has never used smokeless tobacco. She reports current alcohol use. She reports that she does not use drugs. Family History:   family history is not on file. Mother had breast cancer with metastasis and COPD. Father had COPD.       PHYSICAL EXAM:  Vitals:  BP (!) 170/83   Pulse 78   Temp 98.1 °F (36.7 °C) (Oral)   Resp 16   Ht 5' 6\" (1.676 m)   Wt 99 lb (44.9 kg)   SpO2 94%   BMI 15.98 kg/m²     General Appearance: alert and oriented to person, place and time and in no acute distress  Skin: warm and dry  Head: normocephalic and atraumatic  Eyes: pupils equal, round, and reactive to light, extraocular eye movements intact, conjunctivae normal  Neck: neck supple and non tender without mass   Pulmonary/Chest: clear to auscultation bilaterally- no wheezes, rales or rhonchi, normal air movement, no respiratory distress  Cardiovascular: normal rate, normal S1 and S2 and no carotid bruits  Abdomen: soft, non-tender, non-distended, normal bowel sounds, no masses or organomegaly  Extremities: no cyanosis, no clubbing and no edema  Neurologic: no cranial nerve deficit and speech normal        LABS:  Recent Labs     07/23/21 1853      K 3.8      CO2 26   BUN 12   CREATININE 0.7   GLUCOSE 84   CALCIUM 9.5       Recent Labs     07/23/21 1853 WBC 6.7   RBC 4.55   HGB 14.1   HCT 42.3   MCV 93.0   MCH 31.0   MCHC 33.3   RDW 14.1      MPV 10.6       No results for input(s): POCGLU in the last 72 hours. Radiology:   XR HIP 2-3 VW W PELVIS RIGHT   Final Result   Right femoral neck fracture. XR FEMUR RIGHT (MIN 2 VIEWS)   Final Result   Femoral neck fracture. CT PELVIS WO CONTRAST Additional Contrast? None    (Results Pending)       EKG:     ASSESSMENT:    Right femoral neck fracture  Hypothyroidism  Old CVA      Active Problems:    Hip fracture requiring operative repair, left, closed, initial encounter [facility])  Resolved Problems:    * No resolved hospital problems. *      PLAN:    1. Pain control  2. Orthopedic consult   3. EKG    Code Status: Full code   DVT prophylaxis: Lovenox      NOTE: This report was transcribed using voice recognition software. Every effort was made to ensure accuracy; however, inadvertent computerized transcription errors may be present.   Electronically signed by Christine Valadez MD on 7/23/2021 at 10:35 PM

## 2021-07-24 NOTE — CONSULTS
[address] [address]                                  CONSULTATION    PATIENT NAME: Keerthi Larkin                    :        1955  MED REC NO:   [id number]                            ROOM:  ACCOUNT NO:   [de-identified]                           ADMIT DATE: 2021  PROVIDER:     Cintia Munoz    CONSULT DATE:  2021    REASON FOR CONSULTATION:  Right femoral neck fracture. CHIEF COMPLAINT:  Right hip pain. REQUESTING PROVIDER:  Winnie Motley DO    Pain score:  7/10. HISTORY OF PRESENT ILLNESS:  This is a 80-year-old female, who tripped  over a fan approximately three weeks ago and landed on her right side. She had right hip pain. She had difficulty ambulating. She had pain  with activities. She has less pain at rest.  Her pain is an ache. Her  pain is the same. She has been using a walker. She has been using a  walker since she had a stroke previously. She was seen at the [facility]. X-rays and CAT scans were taken demonstrating minimally displaced  femoral neck fracture. The patient was admitted to Medicine. Orthopedics was consulted. REVIEW OF SYSTEMS:  Denies any chest pain, shortness of breath,  dizziness, nausea, vomiting, abdominal pain, or diarrhea. PAST MEDICAL HISTORY:  Anxiety, arthritis, cancer, stroke, and high  cholesterol. PAST SURGICAL HISTORY:  Appendectomy, , and tonsillectomy. MEDICATIONS:  See list.    ALLERGIES:  ASPIRIN and GARLIC OIL, but she is able to take  enteric-coated aspirin. SOCIAL HISTORY:  Smokes half a pack a day. Admits to alcohol. Denies  drugs. FAMILY HISTORY:  Breast cancer and COPD. PHYSICAL EXAMINATION:  GENERAL:  In no acute distress. Awake, alert, and responds to commands. HEENT:  Head:  Normocephalic and atraumatic. Eyes:  Extraocular  movements intact. Sclerae are clear. NECK:  Nontender.

## 2021-07-24 NOTE — PROGRESS NOTES
Plan for surgery today with [facility]  Right femoral neck fracture  Right hip CRPP  NPO  Bed rest

## 2021-07-24 NOTE — PROGRESS NOTES
[facility]   [facility]    Admitting Date and Time: 7/23/2021  4:53 PM  Admit Dx: Hip fracture requiring operative repair, left, closed, initial encounter ([facility] [address] [S72.002A]    Subjective:    Patient was admitted with Hip fracture requiring operative repair, left, closed, initial encounter ([facility] [address] Winferd Sides. Overall doing well, has hip pain but is otherwise at baseline. No chest pain or shortness of breath. ROS: denies fever, chills, cp, sob, n/v, HA unless stated above.      atorvastatin  40 mg Oral Nightly    gabapentin  300 mg Oral TID    levothyroxine  50 mcg Oral Daily    tiZANidine  4 mg Oral TID    vitamin D  50,000 Units Oral Weekly    sodium chloride flush  5-40 mL Intravenous 2 times per day    nicotine  1 patch Transdermal Daily     sodium chloride flush, 5-40 mL, PRN  sodium chloride, 25 mL, PRN  ondansetron, 4 mg, Q8H PRN   Or  ondansetron, 4 mg, Q6H PRN  polyethylene glycol, 17 g, Daily PRN  acetaminophen, 650 mg, Q6H PRN   Or  acetaminophen, 650 mg, Q6H PRN  traMADol, 50 mg, Q6H PRN   Or  traMADol, 100 mg, Q6H PRN  morphine, 2 mg, Q4H PRN         Objective:    BP (!) 113/57   Pulse 79   Temp 98.3 °F (36.8 °C) (Oral)   Resp 16   Ht 5' 6\" (1.676 m)   Wt 99 lb (44.9 kg)   SpO2 94%   BMI 15.98 kg/m²   General Appearance: alert and oriented to person, place and time, well developed and well- nourished, in no acute distress  Skin: warm and dry, no rash or erythema  Head: normocephalic and atraumatic  Eyes: pupils equal, round, and reactive to light, extraocular eye movements intact, conjunctivae normal  ENT: tympanic membrane, external ear and ear canal normal bilaterally, nose without deformity, nasal mucosa and turbinates normal without polyps  Neck: supple and non-tender   Pulmonary/Chest: good air flow, sounds slightly diminished but no crackles or wheezes  Cardiovascular: normal rate, regular rhythm, normal S1 and S2, no murmurs  Abdomen: flat, soft, non-tender, non-distended  Extremities: no peripheral edema, right leg not moved      Recent Labs     07/23/21  1853      K 3.8      CO2 26   BUN 12   CREATININE 0.7   GLUCOSE 84   CALCIUM 9.5       Recent Labs     07/23/21  1853   WBC 6.7   RBC 4.55   HGB 14.1   HCT 42.3   MCV 93.0   MCH 31.0   MCHC 33.3   RDW 14.1      MPV 10.6       Radiology:   CT PELVIS WO CONTRAST Additional Contrast? None   Final Result   Impacted subcapital right femoral neck fracture. Right hip joint effusion. Significant distention of the urinary bladder. XR HIP 2-3 VW W PELVIS RIGHT   Final Result   Right femoral neck fracture. XR FEMUR RIGHT (MIN 2 VIEWS)   Final Result   Femoral neck fracture. FLUORO FOR SURGICAL PROCEDURES    (Results Pending)       Assessment:    Active Problems:    Hip fracture requiring operative repair, left, closed, initial encounter ([facility] [address]    Fracture of unspecified part of neck of right femur, initial encounter for closed fracture ([facility] [address]  Resolved Problems:    * No resolved hospital problems. *      Plan:    1. Right hip fracture  Discussed at bedside with orthopedic surgery - plan is for screws. In terms of medical risk, patient is optimized for planned procedure. She does have history of COPD, but lungs are clear and she is not on oxygen. Apparently patient is on daily inhaler at home but she doesn't know name and it isn't on med list - will try and discover what this is. She can have prn duonebs if needed. She needs to do post op incentive spirometry to lower risk of post op pulmonary complications. She does have poor R-wave progression on ECG - unchanged essentially from in December when she was admitted for acute stroke. She has no cardiac history, no cardiac symptoms at baseline and has fair exercise tolerance with no symptoms at home so cardiac risk is quite low for this mostly low risk procedure.   She should proceed with surgery, continue ASA and lipitor. Post op DVT prophylaxis per ortho. 2.  History of COPD  As above, lungs clear, prn duoneb. Will try and find out what home inhaler is. 3.  Hypothyroidism  Continue synthroid. 4.  Chronic neuropathy  Surgery may worsen this. Continue neruontin and zanaflex  Patient also on relafen - will hold this and use narcotic post op for now.     Electronically signed by Greg Gatica MD on 7/24/2021 at 10:03 AM

## 2021-07-24 NOTE — ANESTHESIA PRE PROCEDURE
Department of Anesthesiology  Preprocedure Note       Name:  Lakia Stock   Age:  77 y.o.  :  1955                                          MRN:  [id number]         Date:  2021      Surgeon: Gracie Cid):  Milind Parikh MD    Procedure: Procedure(s):  HIP OPEN REDUCTION INTERNAL FIXATION    Medications prior to admission:   Prior to Admission medications    Medication Sig Start Date End Date Taking?  Authorizing Provider   aspirin (ECOTRIN LOW STRENGTH) 81 MG EC tablet Take 1 tablet by mouth daily 21   Ashok Ramires DO   atorvastatin (LIPITOR) 40 MG tablet Take 1 tablet by mouth nightly 21   Ashok Ramires DO   vitamin D (ERGOCALCIFEROL) 1.25 MG (91234 UT) CAPS capsule Take 1 capsule by mouth once a week 21   Ashok Ramires DO   tiZANidine (ZANAFLEX) 4 MG tablet Take 4 mg by mouth 3 times daily    Historical Provider, MD   nabumetone (RELAFEN) 750 MG tablet Take 750 mg by mouth 2 times daily    Historical Provider, MD   levothyroxine (SYNTHROID) 50 MCG tablet Take 50 mcg by mouth Daily    Historical Provider, MD   levocetirizine (XYZAL) 5 MG tablet Take 5 mg by mouth nightly    Historical Provider, MD   gabapentin (NEURONTIN) 300 MG capsule Take 300 mg by mouth 3 times daily    Historical Provider, MD   etodolac (LODINE) 400 MG tablet Take 400 mg by mouth daily    Historical Provider, MD   Multiple Vitamins-Minerals (VISION-ROLY PRESERVE PO) Take 2 tablets by mouth daily    Historical Provider, MD       Current medications:    Current Facility-Administered Medications   Medication Dose Route Frequency Provider Last Rate Last Admin    atorvastatin (LIPITOR) tablet 40 mg  40 mg Oral Nightly Claudina Osler, MD   40 mg at 21 2343    gabapentin (NEURONTIN) capsule 300 mg  300 mg Oral TID Claudina Osler, MD   300 mg at 21 2334    levothyroxine (SYNTHROID) tablet 50 mcg  50 mcg Oral Daily Claudina Osler, MD        tiZANidine (ZANAFLEX) tablet 4 mg  4 mg Oral TID Clint Casey MD   4 mg at 07/23/21 2334    vitamin D (ERGOCALCIFEROL) capsule 50,000 Units  50,000 Units Oral Weekly Clint Casey MD        sodium chloride flush 0.9 % injection 5-40 mL  5-40 mL Intravenous 2 times per day Clint Casey MD   10 mL at 07/23/21 2343    sodium chloride flush 0.9 % injection 5-40 mL  5-40 mL Intravenous PRN Clint Casey MD        0.9 % sodium chloride infusion  25 mL Intravenous PRN Clint Casey MD        ondansetron (ZOFRAN-ODT) disintegrating tablet 4 mg  4 mg Oral Q8H PRN Clint Casey MD        Or    ondansetron [facility] injection 4 mg  4 mg Intravenous Q6H PRN Clint Casey MD        polyethylene glycol (GLYCOLAX) packet 17 g  17 g Oral Daily PRN Clint Casey MD        acetaminophen (TYLENOL) tablet 650 mg  650 mg Oral Q6H PRN Clint Casey MD        Or    acetaminophen (TYLENOL) suppository 650 mg  650 mg Rectal Q6H PRN Clint Casey MD        nicotine (NICODERM CQ) 14 MG/24HR 1 patch  1 patch Transdermal Daily Clint Casey MD        traMADol Tc [place]te) tablet 50 mg  50 mg Oral Q6H PRN Clint Casey MD        Or    traMADol Tc [place]ger) tablet 100 mg  100 mg Oral Q6H PRN Clint Casey MD        morphine (PF) injection 2 mg  2 mg Intravenous Q4H PRN Clint Casey MD   2 mg at 07/24/21 0706       Allergies: Allergies   Allergen Reactions    Aspirin      CAN TAKE ENTERIC COATED ONLY    Garlic Oil Itching     Shaking.         Problem List:    Patient Active Problem List   Diagnosis Code    Cerebral atherosclerosis I67.2    Peripheral neuropathy due to ischemia G62.89    History of cerebral infarction Z86.73    Right thalamic stroke ([address] I63.9    Tobacco abuse Z72.0    Peripheral neuropathy G62.9    Hypothyroidism E03.9    Muscle spasm M62.838    Acute CVA (cerebrovascular accident) ([address] I63.9    Moderate protein-calorie malnutrition ([address] E44.0    Hip fracture requiring operative repair, left, closed, initial encounter ([facility] S72.002A       Past Medical History:        Diagnosis Date    Anxiety     Arthritis     Cancer ([facility]     Cerebrovascular accident (CVA) due to occlusion of small artery ([facility] 12/19/2020    Dyslipidemia, goal LDL below 70     Headache     3-6 per month; 2/10 on the pain scale    Numbness and tingling of both legs        Past Surgical History:        Procedure Laterality Date   [address], [place], [address]      x 3    TONSILLECTOMY         Social History:    Social History     Tobacco Use    Smoking status: Current Every Day Smoker     Packs/day: 0.50     Types: Cigarettes    Smokeless tobacco: Never Used   Substance Use Topics    Alcohol use: Yes     Comment: rarely                                Ready to quit: Not Answered  Counseling given: Not Answered      Vital Signs (Current):   Vitals:    07/23/21 1638 07/23/21 2111 07/23/21 2145 07/24/21 0545   BP: (!) 168/78 (!) 166/87 (!) 170/83 (!) 113/57   Pulse: 82 79 78 79   Resp: 16  16 16   Temp: 98.2 °F (36.8 °C)  98.1 °F (36.7 °C) 98.3 °F (36.8 °C)   TempSrc: Oral  Oral Oral   SpO2: 98% 95% 94% 94%   Weight: 99 lb (44.9 kg)      Height: 5' 6\" (1.676 m)                                                 BP Readings from Last 3 Encounters:   07/24/21 (!) 113/57   01/15/21 (!) 178/94   01/05/21 117/71       NPO Status:                                                                                 BMI:   Wt Readings from Last 3 Encounters:   07/23/21 99 lb (44.9 kg)   01/15/21 109 lb (49.4 kg)   12/30/20 109 lb 4 oz (49.6 kg)     Body mass index is 15.98 kg/m².     CBC:   Lab Results   Component Value Date    WBC 6.7 07/23/2021    RBC 4.55 07/23/2021    HGB 14.1 07/23/2021    HCT 42.3 07/23/2021    MCV 93.0 07/23/2021    RDW 14.1 07/23/2021     07/23/2021       CMP:   Lab Results   Component Value Date     07/23/2021    K 3.8 07/23/2021  07/23/2021    CO2 26 07/23/2021    BUN 12 07/23/2021    CREATININE 0.7 07/23/2021    GFRAA >60 07/23/2021    LABGLOM >60 07/23/2021    GLUCOSE 84 07/23/2021    GLUCOSE 93 02/20/2012    PROT 7.0 12/19/2020    CALCIUM 9.5 07/23/2021    BILITOT 0.5 12/19/2020    ALKPHOS 66 12/19/2020    AST 24 12/19/2020    ALT 12 12/19/2020       POC Tests: No results for input(s): POCGLU, POCNA, POCK, POCCL, POCBUN, POCHEMO, POCHCT in the last 72 hours. Coags:   Lab Results   Component Value Date    PROTIME 10.8 12/19/2020    PROTIME 11.2 10/24/2011    INR 0.9 12/19/2020    APTT 34.0 12/19/2020       HCG (If Applicable): No results found for: PREGTESTUR, PREGSERUM, HCG, HCGQUANT     ABGs: No results found for: PHART, PO2ART, XPV8MNE, BBG8HQR, BEART, H5OFWFNE     Type & Screen (If Applicable):  No results found for: LABABO, LABRH    Drug/Infectious Status (If Applicable):  No results found for: HIV, HEPCAB    COVID-19 Screening (If Applicable):   Lab Results   Component Value Date    COVID19 Not Detected 12/21/2020           Anesthesia Evaluation  Patient summary reviewed no history of anesthetic complications:   Airway: Mallampati: III  TM distance: >3 FB   Neck ROM: full  Mouth opening: > = 3 FB Dental:    (+) edentulous      Pulmonary: breath sounds clear to auscultation  (+) COPD:  current smoker                           Cardiovascular:Negative CV ROS    (+) angina:,       ECG reviewed  Rhythm: regular  Rate: normal                 ROS comment: Cleared medically. Neuro/Psych:   (+) CVA (Left sided weakness.):, neuromuscular disease (Peripheral neuropathy.):,             GI/Hepatic/Renal: Neg GI/Hepatic/Renal ROS            Endo/Other:    (+) hypothyroidism::., .                 Abdominal:             Vascular: negative vascular ROS. Other Findings:           Anesthesia Plan      general and spinal     ASA 3     (Pt agrees to GA--IV induction and ETTube.  She declines spinal anesthesia.)  Induction:

## 2021-07-25 LAB
APTT: 33.3 SEC (ref 24.5–35.1)
HCT VFR BLD CALC: 35.3 % (ref 34–48)
HEMOGLOBIN: 11.6 G/DL (ref 11.5–15.5)
INR BLD: 1
MCH RBC QN AUTO: 30.8 PG (ref 26–35)
MCHC RBC AUTO-ENTMCNC: 32.9 % (ref 32–34.5)
MCV RBC AUTO: 93.6 FL (ref 80–99.9)
PDW BLD-RTO: 14 FL (ref 11.5–15)
PLATELET # BLD: 226 E9/L (ref 130–450)
PMV BLD AUTO: 10.7 FL (ref 7–12)
PROTHROMBIN TIME: 11.3 SEC (ref 9.3–12.4)
RBC # BLD: 3.77 E12/L (ref 3.5–5.5)
WBC # BLD: 6.9 E9/L (ref 4.5–11.5)

## 2021-07-25 PROCEDURE — 99232 SBSQ HOSP IP/OBS MODERATE 35: CPT | Performed by: INTERNAL MEDICINE

## 2021-07-25 PROCEDURE — 6370000000 HC RX 637 (ALT 250 FOR IP): Performed by: ORTHOPAEDIC SURGERY

## 2021-07-25 PROCEDURE — 85027 COMPLETE CBC AUTOMATED: CPT

## 2021-07-25 PROCEDURE — 6360000002 HC RX W HCPCS: Performed by: ORTHOPAEDIC SURGERY

## 2021-07-25 PROCEDURE — 85610 PROTHROMBIN TIME: CPT

## 2021-07-25 PROCEDURE — 36415 COLL VENOUS BLD VENIPUNCTURE: CPT

## 2021-07-25 PROCEDURE — 1200000000 HC SEMI PRIVATE

## 2021-07-25 PROCEDURE — 85730 THROMBOPLASTIN TIME PARTIAL: CPT

## 2021-07-25 PROCEDURE — 97116 GAIT TRAINING THERAPY: CPT

## 2021-07-25 PROCEDURE — 97110 THERAPEUTIC EXERCISES: CPT

## 2021-07-25 PROCEDURE — 2580000003 HC RX 258: Performed by: ORTHOPAEDIC SURGERY

## 2021-07-25 PROCEDURE — 97161 PT EVAL LOW COMPLEX 20 MIN: CPT

## 2021-07-25 RX ADMIN — TRAMADOL HYDROCHLORIDE 100 MG: 50 TABLET, FILM COATED ORAL at 08:45

## 2021-07-25 RX ADMIN — ATORVASTATIN CALCIUM 40 MG: 40 TABLET, FILM COATED ORAL at 21:13

## 2021-07-25 RX ADMIN — OXYCODONE 5 MG: 5 TABLET ORAL at 21:19

## 2021-07-25 RX ADMIN — GABAPENTIN 300 MG: 300 CAPSULE ORAL at 15:03

## 2021-07-25 RX ADMIN — TIZANIDINE 4 MG: 4 TABLET ORAL at 15:03

## 2021-07-25 RX ADMIN — TRAMADOL HYDROCHLORIDE 100 MG: 50 TABLET, FILM COATED ORAL at 08:50

## 2021-07-25 RX ADMIN — Medication 10 ML: at 21:20

## 2021-07-25 RX ADMIN — GABAPENTIN 300 MG: 300 CAPSULE ORAL at 21:13

## 2021-07-25 RX ADMIN — ENOXAPARIN SODIUM 40 MG: 40 INJECTION SUBCUTANEOUS at 08:50

## 2021-07-25 RX ADMIN — ACETAMINOPHEN 650 MG: 325 TABLET ORAL at 15:03

## 2021-07-25 RX ADMIN — WATER 1000 MG: 1 INJECTION INTRAMUSCULAR; INTRAVENOUS; SUBCUTANEOUS at 08:45

## 2021-07-25 RX ADMIN — TIZANIDINE 4 MG: 4 TABLET ORAL at 08:45

## 2021-07-25 RX ADMIN — LEVOTHYROXINE SODIUM 50 MCG: 50 TABLET ORAL at 06:08

## 2021-07-25 RX ADMIN — GABAPENTIN 300 MG: 300 CAPSULE ORAL at 08:50

## 2021-07-25 RX ADMIN — WATER 1000 MG: 1 INJECTION INTRAMUSCULAR; INTRAVENOUS; SUBCUTANEOUS at 03:01

## 2021-07-25 RX ADMIN — OXYCODONE 5 MG: 5 TABLET ORAL at 02:12

## 2021-07-25 ASSESSMENT — PAIN DESCRIPTION - PROGRESSION
CLINICAL_PROGRESSION: RAPIDLY IMPROVING
CLINICAL_PROGRESSION: NOT CHANGED

## 2021-07-25 ASSESSMENT — PAIN SCALES - GENERAL
PAINLEVEL_OUTOF10: 6
PAINLEVEL_OUTOF10: 0
PAINLEVEL_OUTOF10: 5
PAINLEVEL_OUTOF10: 3
PAINLEVEL_OUTOF10: 3
PAINLEVEL_OUTOF10: 0
PAINLEVEL_OUTOF10: 5
PAINLEVEL_OUTOF10: 3

## 2021-07-25 ASSESSMENT — PAIN DESCRIPTION - LOCATION: LOCATION: HIP

## 2021-07-25 ASSESSMENT — PAIN - FUNCTIONAL ASSESSMENT
PAIN_FUNCTIONAL_ASSESSMENT: PREVENTS OR INTERFERES WITH MANY ACTIVE NOT PASSIVE ACTIVITIES
PAIN_FUNCTIONAL_ASSESSMENT: ACTIVITIES ARE NOT PREVENTED

## 2021-07-25 ASSESSMENT — PAIN DESCRIPTION - ONSET: ONSET: ON-GOING

## 2021-07-25 ASSESSMENT — PAIN DESCRIPTION - DESCRIPTORS: DESCRIPTORS: ACHING;DISCOMFORT;CONSTANT

## 2021-07-25 ASSESSMENT — PAIN DESCRIPTION - ORIENTATION: ORIENTATION: RIGHT

## 2021-07-25 ASSESSMENT — PAIN DESCRIPTION - FREQUENCY: FREQUENCY: INTERMITTENT

## 2021-07-25 ASSESSMENT — PAIN DESCRIPTION - PAIN TYPE: TYPE: CHRONIC PAIN

## 2021-07-25 NOTE — PROGRESS NOTES
Orthopaedic Surgery Progress Note  Naina Kingston MD    Date of Encounter: 7/25/21    Time of Encounter: 1200    Subjective: Right hip pain. Objective:  General - NAD, awake, alert  Extremity - right leg - dressing clean and dry. Sensation and motor intact. Warm and well perfused.     Assessment: 76 yo female POD 1 right hip CRPP    Plan:  NWB right leg  ECASA  PT  Pain control  Follow up with Evelio in 2 weeks

## 2021-07-25 NOTE — PROGRESS NOTES
Initial Eval      Attending Provider:  Janie Velasquez MD    Evaluating PT:  Harland Seat PT    Room #:  7585/8542-R  Diagnosis:  RT Hip Fx  Pertinent PMHx/PSHx:  CVA with LT side weakness,   Procedure/Surgery:  ORIF RT hip  Precautions:  NWB RTLE, Falls, CVA with LT weakness, General  Equipment Needs:  [employer]    SUBJECTIVE:    Pt lives alone in a 1 story Apt. home with 4 stairs and 0 rail outside to enter. Pt ambulated with 4 WW PTA. OBJECTIVE:   Initial Evaluation  Date: 7/25/21 Treatment Short Term/ Long Term   Goals   Was pt agreeable to Eval/treatment? yes     Does pt have pain? RT hip     Bed Mobility  Rolling: Mod  Supine to sit: Mod/Max  Sit to supine: NA  Scooting: Mod  Min to CGA levels   Transfers Sit to stand: Mod+ x 2  Stand to sit: Min  Stand pivot: Mod x 2  Min to CGA NWB RTLE   Ambulation   0-1' feet with [employer] Max/dep x 2 NWB  5 feet with [employer] Mod x 2   Stair negotiation: ascended and descended        ROM AA/A hip flex 80*, knee flex 100*, knee ext 0*  Active hip flex 80-90*,   MMT RT hip 3-/5, knee 3+/5  Gain RTLE globally 1/2 to 1 grade   AM-PAC 6 Clicks 35/19       Pt is A & O x 3   Sensation:  Pt denies numbness and tingling to RT extremity  Edema:  Post op RTLE  Balance: sitting:Indep standing: Max x 2 assit   Endurance: INEZ 2/2 limited mobility     Patient education  Pt educated on NWB during transfers, gait etc. Hand place during transfers,     Patient response to education:   Pt verbalized understanding Pt demonstrated skill Pt requires further education in this area   Y Y Review     ASSESSMENT:    Comments: In bed on arrival receptive for PT eval and to get OOB into chair. Max/mod assist to get to EOB 2/2 pain RT hip, weakness LE's and general fatigue. Sat on EOB with a LT trunk list to unload RT hip. Transfer from bed surface with 2 assist Max; RTLE crosses over LT standing but able to stand unilaterally LT despite CVA history weak LTLE. MMT LTLE pre transfer was 3+-4/5 globally.    Unable to take step FWD for chair sitting which required max/dep transfer of 2 to get into chair. Safely placed and positioned back into hip chair with assist x 2. Waffle cushion in line for comfort. Call light and wall phone on bed to LT side. Tray brought to RT side and height adjusted. There-ex RTLE hip, knee and ankle in chair x 20 reps ea. Tolerated all well. Treatment:  Patient practiced and was instructed in the following treatment:     Eval   There-x   Education       Pt's/ family goals   1. Home reported     Patient and or family understand(s) diagnosis, prognosis, and plan of care. PLAN:    PT care will be provided in accordance with the objectives noted above. Exercises and functional mobility practice will be used as well as appropriate assistive devices or modalities to obtain goals. Patient and family education will also be administered as needed. Frequency of treatments: BID as laura       Total Treatment Time  30 minutes     Evaluation Time includes thorough review of current medical information, gathering information on past medical history/social history and prior level of function, completion of standardized testing/informal observation of tasks, assessment of data and education on plan of care and goals.     CPT codes:  [x] Low Complexity PT evaluation 75203  [] Moderate Complexity PT evaluation 68446  [] High Complexity PT evaluation 84445  [] PT Re-evaluation 35105  [x] Gait training 54798 10 minutes  [] Manual therapy 09781   minutes  [x] Therapeutic activities 90996 5 minutes  [x] Therapeutic exercises 39706 15 minutes  [] Neuromuscular reeducation 75162   minutes    Phylicia Irving PT

## 2021-07-25 NOTE — PROGRESS NOTES
Patient alert and oriented X4. Patient  was able to get out of bed, non weight barring on her R leg. Activly using incentive spirometer and deep breathing.  Patients dressing clean dry and intact.

## 2021-07-25 NOTE — PROGRESS NOTES
Patient was unable to void after catheter removal. Intermittent straight cath yielded 20 ml urine.  Will continue to monitor urine output,

## 2021-07-25 NOTE — PROGRESS NOTES
[facility] Hospitalist   Progress Note    Admitting Date and Time: 7/23/2021  4:53 PM  Admit Dx: Hip fracture requiring operative repair, left, closed, initial encounter ([facility] [S72.002A]    Subjective:    Patient was admitted with Hip fracture requiring operative repair, left, closed, initial encounter ([facility] Anjum Xavier. Doing well, not much pain. Has no complaints    ROS: denies fever, chills, cp, sob, n/v, HA unless stated above.      sodium chloride flush  10 mL Intravenous 2 times per day    enoxaparin  40 mg Subcutaneous Daily    atorvastatin  40 mg Oral Nightly    gabapentin  300 mg Oral TID    levothyroxine  50 mcg Oral Daily    tiZANidine  4 mg Oral TID    vitamin D  50,000 Units Oral Weekly    sodium chloride flush  5-40 mL Intravenous 2 times per day    nicotine  1 patch Transdermal Daily     sodium chloride flush, 10 mL, PRN  sodium chloride, 25 mL, PRN  acetaminophen, 650 mg, Q4H PRN  oxyCODONE, 5 mg, Q4H PRN  sodium chloride flush, 5-40 mL, PRN  sodium chloride, 25 mL, PRN  ondansetron, 4 mg, Q8H PRN   Or  ondansetron, 4 mg, Q6H PRN  polyethylene glycol, 17 g, Daily PRN  acetaminophen, 650 mg, Q6H PRN   Or  acetaminophen, 650 mg, Q6H PRN  traMADol, 50 mg, Q6H PRN   Or  traMADol, 100 mg, Q6H PRN  morphine, 2 mg, Q4H PRN         Objective:    /68   Pulse 68   Temp 98.8 °F (37.1 °C) (Oral)   Resp 16   Ht 5' 6\" (1.676 m)   Wt 115 lb (52.2 kg)   SpO2 93%   BMI 18.56 kg/m²   General Appearance: alert and oriented to person, place and time, well developed and well- nourished, in no acute distress  Skin: warm and dry, no rash or erythema  Head: normocephalic and atraumatic  Neck: supple and non-tender   Pulmonary/Chest: good air flow, sounds slightly diminished but no crackles or wheezes  Cardiovascular: normal rate, regular rhythm, normal S1 and S2, no murmurs  Abdomen: flat, soft, non-tender, non-distended  Extremities: no peripheral edema, right leg not

## 2021-07-25 NOTE — PROGRESS NOTES
Daily Treat  Evaluating PT:  Brett Matt PT     Room #:  8460/7698-Z  Diagnosis:  RT Hip Fx  Pertinent PMHx/PSHx:  CVA with LT side weakness,   Procedure/Surgery:  ORIF RT hip  Precautions:  NWB RTLE, Falls, CVA with LT weakness, General  Equipment Needs:  [employer]     SUBJECTIVE:     Pt lives alone in a 1 story Apt. home with 4 stairs and 0 rail outside to enter. Pt ambulated with 4 WW PTA.     OBJECTIVE:    Initial Evaluation  Date: 7/25/21 Treatment  7/25/21  Short Term/ Long Term   Goals   Was pt agreeable to Eval/treatment? yes Yes      Does pt have pain? RT hip RT hip      Bed Mobility  Rolling: Mod  Supine to sit: Mod/Max  Sit to supine: NA  Scooting: Mod  Min to CGA levels  Min to CGA levels   Transfers Sit to stand: Mod+ x 2  Stand to sit: Min  Stand pivot: Mod x 2  Mod x 1 NWB RT  Sit-stand and CGA stand to sit Min to CGA NWB RTLE   Ambulation   0-1' feet with [employer] Max/dep x 2 NWB  5' [employer] Mod A for balancing NWB RTLE 5 feet with [employer] Mod x 2   Stair negotiation: ascended and descended    NA     ROM AA/A hip flex 80*, knee flex 100*, knee ext 0*  As AM session Active hip flex 80-90*,   MMT RT hip 3-/5, knee 3+/5  As AM session Gain RTLE globally 1/2 to 1 grade   AM-PAC 6 Clicks 10/80  28/44      Pt is alert and oriented x3  Balance: Sit Indep and stand Mod A       Therapeutic Exercises:  RT hip flex AA, LAQ, AP x 20 ea    Patient education  Pt educated on Imp on NWB status, hand place with X-fers,     Patient response to education:   Pt verbalized understanding Pt demonstrated skill Pt requires further education in this area   Y Y May need review     ASSESSMENT:    Comments: In bed on arrival stating \"they going to let me go home tomorrow\". We talked over her current mobility status and that she may need short stay in rehab to prevent a additional fall. Bed mobility did improve slightly but still needs assistance 2/2 pain, weakness BLE's. B/L ankles in inc PF positioning but has good active DF each ankle.   RTLE has tendancy to fully adduct and IR slightly seated. Has a great amount of RT lateral hip pain. Highly engaged and cooperative throughout. Tolerates there-ex rather well with good smooth ROM hip and knee. Knee flexion 105*+ w/o pain. Hip flexion AA/A 90*+. Return back into bed per her request.  PCD's placed. Call light and wall phone on top bed to RT. Very appreciative for care. Treatment:  Patient practiced and was instructed in the following treatment:     Amb   There-ex   Functional bed     PLAN:    Pt is making fair progress toward established Physical Therapy goals. Continue with physical therapy current plan of care. Total Treatment Time  25 minutes     Evaluation Time includes thorough review of current medical information, gathering information on past medical history/social history and prior level of function, completion of standardized testing/informal observation of tasks, assessment of data and education on plan of care and goals.     CPT codes:  [] Low Complexity PT evaluation 51860  [] Moderate Complexity PT evaluation 89921  [] High Complexity PT evaluation 42074  [] PT Re-evaluation 99182  [x] Gait training 51833  10 minutes  [] Manual therapy 06230  minutes  [x] Therapeutic activities 06505  5 minutes  [x] Therapeutic exercises 02047 10 minutes  [] Neuromuscular reeducation 08966 ** minutes       Beola Pale PT

## 2021-07-26 LAB
ANION GAP SERPL CALCULATED.3IONS-SCNC: 10 MMOL/L (ref 7–16)
APTT: 35.3 SEC (ref 24.5–35.1)
BUN BLDV-MCNC: 12 MG/DL (ref 6–23)
CALCIUM SERPL-MCNC: 8.7 MG/DL (ref 8.6–10.2)
CHLORIDE BLD-SCNC: 106 MMOL/L (ref 98–107)
CO2: 23 MMOL/L (ref 22–29)
CREAT SERPL-MCNC: 0.6 MG/DL (ref 0.5–1)
EKG ATRIAL RATE: 89 BPM
EKG P AXIS: 60 DEGREES
EKG P-R INTERVAL: 108 MS
EKG Q-T INTERVAL: 364 MS
EKG QRS DURATION: 94 MS
EKG QTC CALCULATION (BAZETT): 442 MS
EKG R AXIS: -2 DEGREES
EKG T AXIS: 36 DEGREES
EKG VENTRICULAR RATE: 89 BPM
GFR AFRICAN AMERICAN: >60
GFR NON-AFRICAN AMERICAN: >60 ML/MIN/1.73
GLUCOSE BLD-MCNC: 89 MG/DL (ref 74–99)
HCT VFR BLD CALC: 35.6 % (ref 34–48)
HEMOGLOBIN: 12 G/DL (ref 11.5–15.5)
INR BLD: 1
MCH RBC QN AUTO: 30.7 PG (ref 26–35)
MCHC RBC AUTO-ENTMCNC: 33.7 % (ref 32–34.5)
MCV RBC AUTO: 91 FL (ref 80–99.9)
MRSA CULTURE ONLY: NORMAL
PDW BLD-RTO: 13.9 FL (ref 11.5–15)
PLATELET # BLD: 220 E9/L (ref 130–450)
PMV BLD AUTO: 10.7 FL (ref 7–12)
POTASSIUM REFLEX MAGNESIUM: 3.8 MMOL/L (ref 3.5–5)
PROTHROMBIN TIME: 11.5 SEC (ref 9.3–12.4)
RBC # BLD: 3.91 E12/L (ref 3.5–5.5)
SODIUM BLD-SCNC: 139 MMOL/L (ref 132–146)
WBC # BLD: 6 E9/L (ref 4.5–11.5)

## 2021-07-26 PROCEDURE — 85610 PROTHROMBIN TIME: CPT

## 2021-07-26 PROCEDURE — 85027 COMPLETE CBC AUTOMATED: CPT

## 2021-07-26 PROCEDURE — 1200000000 HC SEMI PRIVATE

## 2021-07-26 PROCEDURE — 6360000002 HC RX W HCPCS: Performed by: ORTHOPAEDIC SURGERY

## 2021-07-26 PROCEDURE — 80048 BASIC METABOLIC PNL TOTAL CA: CPT

## 2021-07-26 PROCEDURE — 2580000003 HC RX 258: Performed by: ORTHOPAEDIC SURGERY

## 2021-07-26 PROCEDURE — 36415 COLL VENOUS BLD VENIPUNCTURE: CPT

## 2021-07-26 PROCEDURE — 85730 THROMBOPLASTIN TIME PARTIAL: CPT

## 2021-07-26 PROCEDURE — 97110 THERAPEUTIC EXERCISES: CPT

## 2021-07-26 PROCEDURE — 97165 OT EVAL LOW COMPLEX 30 MIN: CPT

## 2021-07-26 PROCEDURE — 6370000000 HC RX 637 (ALT 250 FOR IP): Performed by: ORTHOPAEDIC SURGERY

## 2021-07-26 PROCEDURE — 97530 THERAPEUTIC ACTIVITIES: CPT

## 2021-07-26 PROCEDURE — 97535 SELF CARE MNGMENT TRAINING: CPT

## 2021-07-26 RX ADMIN — LEVOTHYROXINE SODIUM 50 MCG: 50 TABLET ORAL at 04:54

## 2021-07-26 RX ADMIN — GABAPENTIN 300 MG: 300 CAPSULE ORAL at 20:23

## 2021-07-26 RX ADMIN — TIZANIDINE 4 MG: 4 TABLET ORAL at 20:23

## 2021-07-26 RX ADMIN — Medication 10 ML: at 20:25

## 2021-07-26 RX ADMIN — OXYCODONE 5 MG: 5 TABLET ORAL at 04:25

## 2021-07-26 RX ADMIN — ENOXAPARIN SODIUM 40 MG: 40 INJECTION SUBCUTANEOUS at 10:26

## 2021-07-26 RX ADMIN — GABAPENTIN 300 MG: 300 CAPSULE ORAL at 10:26

## 2021-07-26 RX ADMIN — ATORVASTATIN CALCIUM 40 MG: 40 TABLET, FILM COATED ORAL at 20:23

## 2021-07-26 RX ADMIN — TIZANIDINE 4 MG: 4 TABLET ORAL at 10:27

## 2021-07-26 RX ADMIN — TRAMADOL HYDROCHLORIDE 100 MG: 50 TABLET, FILM COATED ORAL at 23:42

## 2021-07-26 ASSESSMENT — PAIN DESCRIPTION - DESCRIPTORS
DESCRIPTORS: ACHING;DISCOMFORT;SORE
DESCRIPTORS: ACHING;DISCOMFORT;SORE

## 2021-07-26 ASSESSMENT — PAIN SCALES - GENERAL
PAINLEVEL_OUTOF10: 7
PAINLEVEL_OUTOF10: 7
PAINLEVEL_OUTOF10: 0

## 2021-07-26 ASSESSMENT — PAIN DESCRIPTION - LOCATION
LOCATION: HIP
LOCATION: HIP

## 2021-07-26 ASSESSMENT — PAIN DESCRIPTION - PROGRESSION
CLINICAL_PROGRESSION: GRADUALLY WORSENING
CLINICAL_PROGRESSION: RAPIDLY IMPROVING
CLINICAL_PROGRESSION: GRADUALLY WORSENING

## 2021-07-26 ASSESSMENT — PAIN - FUNCTIONAL ASSESSMENT
PAIN_FUNCTIONAL_ASSESSMENT: PREVENTS OR INTERFERES SOME ACTIVE ACTIVITIES AND ADLS
PAIN_FUNCTIONAL_ASSESSMENT: ACTIVITIES ARE NOT PREVENTED
PAIN_FUNCTIONAL_ASSESSMENT: ACTIVITIES ARE NOT PREVENTED

## 2021-07-26 ASSESSMENT — PAIN DESCRIPTION - ORIENTATION
ORIENTATION: RIGHT
ORIENTATION: RIGHT

## 2021-07-26 ASSESSMENT — PAIN DESCRIPTION - PAIN TYPE
TYPE: SURGICAL PAIN
TYPE: SURGICAL PAIN

## 2021-07-26 ASSESSMENT — PAIN DESCRIPTION - ONSET
ONSET: ON-GOING
ONSET: ON-GOING

## 2021-07-26 ASSESSMENT — PAIN DESCRIPTION - FREQUENCY
FREQUENCY: INTERMITTENT
FREQUENCY: INTERMITTENT

## 2021-07-26 NOTE — DISCHARGE INSTR - COC
(37 °C) (Oral)   Resp 16   Ht 5' 6\" (1.676 m)   Wt 115 lb (52.2 kg)   SpO2 96%   BMI 18.56 kg/m²     Last documented pain score (0-10 scale): Pain Level: 0  Last Weight:   Wt Readings from Last 1 Encounters:   21 115 lb (52.2 kg)     Mental Status:  {IP PT MENTAL STATUS:}    IV Access:  { GEOFF IV ACCESS:598198241}    Nursing Mobility/ADLs:  Walking   {P DME ZBII:139126674}  Transfer  {CHP DME CBZZ:883809846}  Bathing  {CHP DME TBUZ:571469809}  Dressing  {CHP DME FQBY:004182122}  Toileting  {CHP DME VXXM:441359086}  Feeding  {P DME FSNM:028053508}  Med Admin  {P DME DLRZ:164065958}  Med Delivery   { GEOFF MED Delivery:093163702}    Wound Care Documentation and Therapy:        Elimination:  Continence: Bowel: {YES / SW:71099}  Bladder: {YES / QK:41140}  Urinary Catheter: {Urinary Catheter:873984442}   Colostomy/Ileostomy/Ileal Conduit: {YES / QW:91906}       Date of Last BM: ***    Intake/Output Summary (Last 24 hours) at 2021 1252  Last data filed at 2021 0458  Gross per 24 hour   Intake 400 ml   Output 1200 ml   Net -800 ml     I/O last 3 completed shifts:   In: 400 [P.O.:400]  Out: 1400 [Urine:1400]    Safety Concerns:     [employer] GEOFF Safety Concerns:359635454}    Impairments/Disabilities:      [address] [employer] Impairments/Disabilities:431359260}    Nutrition Therapy:  Current Nutrition Therapy:   { GEOFF Diet List:071270153}    Routes of Feeding: {[facility] DME Other Feedings:174173873}  Liquids: {Slp liquid thickness:14369}  Daily Fluid Restriction: {P DME Yes amt example:307660708}  Last Modified Barium Swallow with Video (Video Swallowing Test): {Done Not Done BQGU:915812457}    Treatments at the Time of Hospital Discharge:   Respiratory Treatments: ***  Oxygen Therapy:  {Therapy; copd oxygen:13209}  Ventilator:    {KOLBY SAM Vent JOWO:714644335}    Rehab Therapies: {THERAPEUTIC INTERVENTION:0088773613}  Weight Bearing Status/Restrictions: {KOLBY SAM Weight Bearin}  Other Medical Equipment (for information only, NOT a DME order):  {EQUIPMENT:970792887}  Other Treatments: ***    Patient's personal belongings (please select all that are sent with patient):  {CHP DME Belongings:004807216}    RN SIGNATURE:  {Esignature:413785021}    CASE MANAGEMENT/SOCIAL WORK SECTION    Inpatient Status Date: ***    Readmission Risk Assessment Score:  Readmission Risk              Risk of Unplanned Readmission:  10           Discharging to Facility/ Agency   Name: [facility]ac[facility] Ribeiro [facility]  Address:  Phone: [phone]  Fax: [phone]    Dialysis Facility (if applicable)   Name:  Address:  Dialysis Schedule:  Phone:  Fax:    / signature: Electronically signed by BOYD Connor on 7/26/2021 at 12:53 PM    PHYSICIAN SECTION    Prognosis: {Prognosis:0957808204}    Condition at Discharge: [address] Bailon Patient Condition:984065422}    Rehab Potential (if transferring to Rehab): {Prognosis:2075152117}    Recommended Labs or Other Treatments After Discharge: ***    Physician Certification: I certify the above information and transfer of Daysi Johnson  is necessary for the continuing treatment of the diagnosis listed and that she requires [facility] for less 30 days.      Update Admission H&P: {CHP DME Changes in GUNZN:811978058}    PHYSICIAN SIGNATURE:  Kary Ulloa D.O.

## 2021-07-26 NOTE — PROGRESS NOTES
Admit Date: 7/23/2021  Hospital day 4    Subjective:     Patient resting in bed in NAD. Denies Cp, SOB, abd pain, N/V, HA, dizziness. Pain is controlled. Objective:       I/O last 3 completed shifts:   In: 400 [P.O.:400]  Out: 1200 [Urine:1200]      /75   Pulse 77   Temp 98.6 °F (37 °C) (Oral)   Resp 16   Ht 5' 6\" (1.676 m)   Wt 115 lb (52.2 kg)   SpO2 96%   BMI 18.56 kg/m²   General appearance: alert, appears stated age, cooperative and no distress  Lungs: clear to auscultation bilaterally  Heart: regular rate and rhythm, S1, S2 normal, no murmur, click, rub or gallop  Abdomen: soft, non-tender; bowel sounds normal; no masses,  no organomegaly  Extremities: extremities normal, atraumatic, no cyanosis or edema  Skin: Skin color, texture, turgor normal. No rashes or lesions       Data ReviewCBC:       Recent Results (from the past 24 hour(s))   CBC    Collection Time: 07/26/21  4:25 AM   Result Value Ref Range    WBC 6.0 4.5 - 11.5 E9/L    RBC 3.91 3.50 - 5.50 E12/L    Hemoglobin 12.0 11.5 - 15.5 g/dL    Hematocrit 35.6 34.0 - 48.0 %    MCV 91.0 80.0 - 99.9 fL    MCH 30.7 26.0 - 35.0 pg    MCHC 33.7 32.0 - 34.5 %    RDW 13.9 11.5 - 15.0 fL    Platelets 119 331 - 359 E9/L    MPV 10.7 7.0 - 12.0 fL   Protime-INR    Collection Time: 07/26/21  4:25 AM   Result Value Ref Range    Protime 11.5 9.3 - 12.4 sec    INR 1.0    APTT    Collection Time: 07/26/21  4:25 AM   Result Value Ref Range    aPTT 35.3 (H) 24.5 - 35.1 sec   Basic Metabolic Panel w/ Reflex to MG    Collection Time: 07/26/21  4:25 AM   Result Value Ref Range    Sodium 139 132 - 146 mmol/L    Potassium reflex Magnesium 3.8 3.5 - 5.0 mmol/L    Chloride 106 98 - 107 mmol/L    CO2 23 22 - 29 mmol/L    Anion Gap 10 7 - 16 mmol/L    Glucose 89 74 - 99 mg/dL    BUN 12 6 - 23 mg/dL    CREATININE 0.6 0.5 - 1.0 mg/dL    GFR Non-African American >60 >=60 mL/min/1.73    GFR African American >60     Calcium 8.7 8.6 - 10.2 mg/dL           Assessment: Active Problems:    Hip fracture requiring operative repair, left, closed, initial encounter ([facility]    Fracture of unspecified part of neck of right femur, initial encounter for closed fracture [facility])  Resolved Problems:    * No resolved hospital problems. *      Plan:     Discharge to rehab when bed available. Pain management per ortho.

## 2021-07-26 NOTE — CARE COORDINATION
Social Work:    Reviewed chart notes. Mrs. Levi Loyola) underwent a right hip femoral neck fracture closed reduction and percutaneous pinning due to fall. Maia Sarmiento has a history of a CVA in December. Social service met with Maia Sarmiento, explained social work role, and discussed discharge planning. Maia Sarmiento resides alone in a ranch home and has a wheeled walker, ETB. She has been to O.P. rehab after her stroke. Social service advised Maia Sarmiento about acute, skilled rehab, [facility], & DME. We also discussed her PT/OT evaluations & scores. Maia Sarmiento initially declined rehab but later agreed to rehab per OT evaluation and encouragement. OT advised social service that Maia Sarmiento will need snf and will not be able to tolerate acute rehab level of care. Social work provided a snf list to Maia Sarmiento to review for rehab choices.     Electronically signed by BOYD Temple on 7/26/2021 at 10:21 AM

## 2021-07-26 NOTE — PROGRESS NOTES
Occupational Therapy  OCCUPATIONAL THERAPY INITIAL EVALUATION     Mraleny [facility], [place]       Date:2021  Patient Name: Lor Fine  MRN: [id number]  : 1955  Room: [address] [place] [address]    Evaluating OT: Luis Alberto Alston OTR/L [id number]    Referring Provider: Bety Rosado MD  Specific Provider Orders/Date: eval and treat 21    Diagnosis: R hip fracture   Procedure: 21 R hip fracture s/p closed reduction percutaneous pinning   Pertinent Medical History: h/o CVA with L sided deficits     Precautions:  fall risk, NWB R LE    Assessment of current deficits   [x] Functional mobility  [x]ADLs  [x] Strength               [x]Cognition   [x] Functional transfers   [x] IADLs         [x] Safety Awareness   [x]Endurance   [] Fine Coordination              [x] Balance      [] Vision/perception   []Sensation    []Gross Motor Coordination  [] ROM  [] Delirium                   [] Motor Control     OT PLAN OF CARE   OT POC based on physician orders, patient diagnosis and results of clinical assessment    Frequency/Duration 2-5 days/wk for 10-14 days PRN   Specific OT Treatment Interventions to include:   * Instruction/training on adapted ADL techniques and AE recommendations to increase functional independence within precautions       * Training on energy conservation strategies, correct breathing pattern and techniques to improve independence/tolerance for self-care routine  * Functional transfer/mobility training/DME recommendations for increased independence, safety, and fall prevention  * Patient/Family education to increase follow through with safety techniques and functional independence  * Recommendation of environmental modifications for increased safety with functional transfers/mobility and ADLs  * Therapeutic exercise to improve motor endurance, ROM, and functional strength for ADLs/functional transfers  * Therapeutic activities to facilitate/challenge dynamic balance, stand tolerance for increased safety and independence with ADLs  * Positioning to improve skin integrity, interaction with environment and functional independence    Recommended Adaptive Equipment: TBD     Home Living: Pt lives alone in a single story apt with 4 steps on entry. Bathroom setup: tub/shower combo, standard commode     Prior Level of Function: Independent with ADLs, Independent with IADLs; completed functional mobility with rollator  Driving: Yes. Pain Level: R LE pain with movement    Cognition: A&O: 4/4.  Pt is alert and oriented but easily confused and appears to have limited insight into current deficits   Problem solving:  poor   Judgement/safety:  poor     Functional Assessment: AM-PAC Daily Activity Raw Score: 15/24   Initial Eval Status  Date: 7/26/21 Treatment session:  STGs=LTGS  Timeframe 10-14 days     Feeding Set up     Grooming Set up     [facility] A  Management of hospital gown  Set up   LB Dressing Max A  Donning R sock  Multiple trials and cues for technique but unsuccessful while maintaining precautions  Min A    Bathing Max A  Min A   Toileting Max A  BSC level  Assist in transfer and cues to maintain NWB R LE  Requires 2 handed UE support on [employer] to maintain standing balance resulting in total assist in reji care  Min A   Bed Mobility  Supine to sit: Min A     Functional Transfers STS: Min to Mod A pending seat height, level of fatigue/weakness and pain in R LE  Cues throughout to maintain NWB R LE  SBA   Functional Mobility Min A with [employer]  Small pivot and hop steps bed to armchair and armchair <> BSC  Initially good follow through of NWB R LE however fatigues easily and with fatigue places minimal weight through R LE  SBA during ADLs   Balance Sitting: fair plus    Standing: fair minus at [employer]     Activity Tolerance Poor plus  Pt has limited tolerance for activity and requires rest between all tasks  Pt declines further activity post transfers and toileting d/t pain and weakness/fatigue  Encouraged to attempt sitting up in chair x30 min, pt reports she \"doubts\" she will tolerate but will try  standing laura x6-7 min with fair balance during self care tasks   B UE R UE: 4-/5  L UE: 3+/5, fair minus coordination, increased time to complete tasks  B UE: 4/5     ADL: Functional endurance training, standing tolerance and incorporation of compensatory strategies during ADL in preparation for maximum safety and independence during self care tasks in home environment. Mod cues throughout session for follow through of proper technique. Treatment: Patient educated on techniques for completion of ADL, safe functional transfers and functional mobility. Patient required cues for follow through with proper hand/foot placement, pacing, safety, compensatory strategies, breathing, precautions and technique in bed mobility, functional transfers, functional mobility, toileting, grooming, UB dressing and LB dressing in preparation for maximum independence in all self care tasks. Hand Dominance: Right   Strength ROM Additional Info:    RUE  4-/5 WFL good  and FMC/dexterity noted during ADL tasks     LUE 3+/5 WFL fair  and fair minus FMC/dexterity noted during ADL tasks         Hearing: WFL   Vision: WFL   Sensation:  No c/o numbness or tingling   Tone: WFL   Edema: none                             Rehab Potential: Good for established goals     Patient/Family Goal: To get home. Patient and/or family were instructed on functional diagnosis, prognosis/goals and OT plan of care. Pt verbalized fair understanding. Upon arrival, patient supine in bed. At end of session, patient seated in hip chair with call light and phone within reach, all lines and tubes intact. Pt would benefit from continued skilled OT to increase safety and independence with completion of ADL/IADL tasks for functional independence and quality of life. Bed/chair alarm: ON. Low Evaluation 79104  Time In: 942  Time Out: 1010  Total: 28 min       Min Units   Therapeutic Ex 57647     Therapeutic Activities 89662 3    ADL/Self Care 52014 11 1   Orthotic Management 81811     Neuro Re-Ed 23498     TOTAL TIMED TREATMENT 14 1       Evaluation time includes thorough review of current medical information, gathering information on past medical history/social history and prior level of function, completion of standardized testing/informal observation of tasks, assessment of data, and development of POC/Goals    Shira Davis OTR/L  [id number]

## 2021-07-26 NOTE — PROGRESS NOTES
throughout, required constant hands on assist for balance and safety, was able to maintain NWB R LE consistently. Pt fatigued after activity, assisted back to bed, exercise performed. Treatment: Pt practiced and was instructed in the following treatment: therapeutic exercise, transfer safety, gait mechanics    Pt was left in bed per request with call light in reach. Chair/bed alarm: bed alarm active    Time in 1112   Time out 1135   Total Treatment Time 23 minutes   CPT codes:     Therapeutic activities 88821 11 minutes   Therapeutic exercises 81528 12 minutes       Pt is making good progress toward established Physical Therapy goals as per increased functional mobility performed and exercise participation. Continue with physical therapy current plan of care seeing Pt daily.     [facility] PTA   License Number: [id number]

## 2021-07-26 NOTE — CARE COORDINATION
Social Work:    Follow-up visit was made with Oral Speaker who prefers [facility] snf. Social service called a referral in to Mini Echavarria at [facility] snf accepted and is starting the insurance authorization today.  (Elli, N-17, ambulance done)    Electronically signed by BOYD Wong on 7/26/2021 at 12:51 PM

## 2021-07-26 NOTE — PROGRESS NOTES
Educated patient on the importance of Incentive Spirometer, Patient returned demonstration of 1500   tolerated 1500 cough deep breathing done well. Hourly rounds continued.

## 2021-07-26 NOTE — PROGRESS NOTES
Pt up to bedside commode tolerated well with walker NWB to right leg pt did well. Voiding yellow urine. Assist back to bed. Hourly rounds continued.

## 2021-07-27 VITALS
HEIGHT: 66 IN | SYSTOLIC BLOOD PRESSURE: 130 MMHG | HEART RATE: 77 BPM | OXYGEN SATURATION: 95 % | WEIGHT: 115 LBS | BODY MASS INDEX: 18.48 KG/M2 | DIASTOLIC BLOOD PRESSURE: 75 MMHG | RESPIRATION RATE: 16 BRPM | TEMPERATURE: 97.8 F

## 2021-07-27 LAB
APTT: 34.2 SEC (ref 24.5–35.1)
HCT VFR BLD CALC: 36.5 % (ref 34–48)
HEMOGLOBIN: 12.1 G/DL (ref 11.5–15.5)
INR BLD: 1
MCH RBC QN AUTO: 30.3 PG (ref 26–35)
MCHC RBC AUTO-ENTMCNC: 33.2 % (ref 32–34.5)
MCV RBC AUTO: 91.5 FL (ref 80–99.9)
PDW BLD-RTO: 13.6 FL (ref 11.5–15)
PLATELET # BLD: 232 E9/L (ref 130–450)
PMV BLD AUTO: 11 FL (ref 7–12)
PROTHROMBIN TIME: 11.4 SEC (ref 9.3–12.4)
RBC # BLD: 3.99 E12/L (ref 3.5–5.5)
SARS-COV-2, NAAT: NOT DETECTED
WBC # BLD: 5.9 E9/L (ref 4.5–11.5)

## 2021-07-27 PROCEDURE — 85730 THROMBOPLASTIN TIME PARTIAL: CPT

## 2021-07-27 PROCEDURE — 6370000000 HC RX 637 (ALT 250 FOR IP): Performed by: ORTHOPAEDIC SURGERY

## 2021-07-27 PROCEDURE — 97535 SELF CARE MNGMENT TRAINING: CPT

## 2021-07-27 PROCEDURE — 97110 THERAPEUTIC EXERCISES: CPT

## 2021-07-27 PROCEDURE — 97530 THERAPEUTIC ACTIVITIES: CPT

## 2021-07-27 PROCEDURE — 36415 COLL VENOUS BLD VENIPUNCTURE: CPT

## 2021-07-27 PROCEDURE — 6360000002 HC RX W HCPCS: Performed by: ORTHOPAEDIC SURGERY

## 2021-07-27 PROCEDURE — 85027 COMPLETE CBC AUTOMATED: CPT

## 2021-07-27 PROCEDURE — 85610 PROTHROMBIN TIME: CPT

## 2021-07-27 PROCEDURE — 87635 SARS-COV-2 COVID-19 AMP PRB: CPT

## 2021-07-27 RX ORDER — NICOTINE 21 MG/24HR
1 PATCH, TRANSDERMAL 24 HOURS TRANSDERMAL DAILY
Qty: 30 PATCH | Refills: 3 | Status: SHIPPED | OUTPATIENT
Start: 2021-07-27

## 2021-07-27 RX ADMIN — GABAPENTIN 300 MG: 300 CAPSULE ORAL at 08:40

## 2021-07-27 RX ADMIN — TIZANIDINE 4 MG: 4 TABLET ORAL at 15:13

## 2021-07-27 RX ADMIN — GABAPENTIN 300 MG: 300 CAPSULE ORAL at 15:13

## 2021-07-27 RX ADMIN — LEVOTHYROXINE SODIUM 50 MCG: 50 TABLET ORAL at 06:51

## 2021-07-27 RX ADMIN — ENOXAPARIN SODIUM 40 MG: 40 INJECTION SUBCUTANEOUS at 08:40

## 2021-07-27 RX ADMIN — TIZANIDINE 4 MG: 4 TABLET ORAL at 08:40

## 2021-07-27 ASSESSMENT — PAIN SCALES - GENERAL: PAINLEVEL_OUTOF10: 6

## 2021-07-27 NOTE — PROGRESS NOTES
Daily Treat  Evaluating PT:  [facility]     Room #:  3477/3894-N  Diagnosis:  RT Hip Fx  Pertinent PMHx/PSHx:  CVA with LT side weakness,   Procedure/Surgery:  ORIF RT hip  Precautions:  NWB RTLE, Falls, CVA with LT weakness, General  Equipment Needs:  [address] [email or web address].     SUBJECTIVE:     Pt lives alone in a 1 story Apt. home with 4 stairs and 0 rail outside to enter. Pt ambulated with 4 WW PTA.     OBJECTIVE:    Initial Evaluation  Date: 7/25/21 Treatment  7/27/21  Short Term/ Long Term   Goals   Was pt agreeable to Eval/treatment? yes Yes      Does pt have pain? RT hip RT hip      Bed Mobility  Rolling: Mod  Supine to sit: Mod/Max  Sit to supine: NA  Scooting: Mod Supine to sit: SBA  Sit to supine: SBA  Min to CGA levels   Transfers Sit to stand: Mod+ x 2  Stand to sit: Min  Stand pivot: Mod x 2 Sit <> stand Min A Min to CGA NWB RTLE   Ambulation   0-1' feet with [address] [employer] Max/dep x 2 NWB 15 feet x 2 using WW for support Min/Mod A for balance. See comments 5 feet with 88 [employer] [address] Mod x 2   Stair negotiation: ascended and descended    NA     ROM AA/A hip flex 80*, knee flex 100*, knee ext 0*   Active hip flex 80-90*,   MMT RT hip 3-/5, knee 3+/5   Gain RTLE globally 1/2 to 1 grade   AM-PAC 6 Clicks 41/56  47/32        Pt is alert and able to follow instruction  Balance: poor dynamic using [address] for support    Pt performed therapeutic exercise of the following: supine B ankle pumps, quad/glut sets AROM; R LE heel slides, hip ABd AROM x 20    Patient education  Pt was educated on exercise promoting circulation, ROM and strengthening, UE usage to assist with transfers, gait promoting NWB R LE    Patient response to education:   Pt verbalized understanding Pt demonstrated skill Pt requires further education in this area   yes With instruction yes     ASSESSMENT:   Comments: Pt found in bed, agreed to rx, exercise performed. Pt then sat  EOB SBA. Gait performed in the room, owen remains ow and consistent.  Pt unsteady throughout, required

## 2021-07-27 NOTE — PROGRESS NOTES
Patient does not want to wait for auth to SNF and will D/C home with family.  Will need to set up [facility] in the AM. Electronically signed by cT Carrillo RN on 7/27/2021 at 4:32 PM

## 2021-07-27 NOTE — PROGRESS NOTES
Occupational Therapy  OT BEDSIDE TREATMENT NOTE      Date:2021  Patient Name: Zahra Quiles  MRN: [id number]  : 1955  Room: [address] [place], [place] [address]      Evaluating OT: Lawyer Mancilla OTR/L [id number]     Referring Provider: Starr Willingham MD  Specific Provider Orders/Date: eval and treat 21     Diagnosis: R hip fracture   Procedure: 21 R hip fracture s/p closed reduction percutaneous pinning   Pertinent Medical History: h/o CVA with L sided deficits      Precautions:  fall risk, NWB R LE     Assessment of current deficits   [x]? Functional mobility             [x]?ADLs           [x]? Strength                  [x]? Cognition   [x]? Functional transfers           [x]? IADLs         [x]? Safety Awareness   [x]? Endurance   []? Fine Coordination              [x]? Balance      []? Vision/perception   []? Sensation     []? Gross Motor Coordination  []? ROM           []?  Delirium                   []? Motor Control      OT PLAN OF CARE   OT POC based on physician orders, patient diagnosis and results of clinical assessment     Frequency/Duration 2-5 days/wk for 10-14 days PRN   Specific OT Treatment Interventions to include:   * Instruction/training on adapted ADL techniques and AE recommendations to increase functional independence within precautions       * Training on energy conservation strategies, correct breathing pattern and techniques to improve independence/tolerance for self-care routine  * Functional transfer/mobility training/DME recommendations for increased independence, safety, and fall prevention  * Patient/Family education to increase follow through with safety techniques and functional independence  * Recommendation of environmental modifications for increased safety with functional transfers/mobility and ADLs  * Therapeutic exercise to improve motor endurance, ROM, and functional strength for ADLs/functional transfers  * Therapeutic activities to facilitate/challenge dynamic balance, stand tolerance for increased safety and independence with ADLs  * Positioning to improve skin integrity, interaction with environment and functional independence     Recommended Adaptive Equipment: TBD     Home Living: Pt lives alone in a single story apt with 4 steps on entry. Bathroom setup: tub/shower combo, standard commode      Prior Level of Function: Independent with ADLs, Independent with IADLs; completed functional mobility with rollator  Driving: Yes.      Pain Level: R LE- Mild     Cognition: A&O: 4/4                Functional Assessment: AM-PAC Daily Activity Raw Score: 16/24    Initial Eval Status  Date: 7/26/21 Treatment session: 7/27/21 STGs=LTGS  Timeframe 10-14 days      Feeding Set up       Grooming Set up S/U seated      UB Dressing Min A  Management of hospital gown  S/U to doff gown and don bra/ pullover shirt Set up   LB Dressing Max A  Donning R sock  Multiple trials and cues for technique but unsuccessful while maintaining precautions  Mod A  Pt used reacher/ sock aid to doff/ don LE clothing.   See comments Min A    Bathing Max A UE: S/U  LE: Mod A  Min A   Toileting Max A  BSC level  Assist in transfer and cues to maintain NWB R LE  Requires 2 handed UE support on [employer] to maintain standing balance resulting in total assist in reji care  Mod A  Assistance required with clothing management but pt able to complete reji care whole seated Min A   Bed Mobility  Supine to sit: Min A Supine to sit: SBA     Functional Transfers STS: Min to Mod A pending seat height, level of fatigue/weakness and pain in R LE  Cues throughout to maintain NWB R LE STS: Min A  From EOB, commode and chair  SPT: Min A  Using ww from bed to BSC  SBA   Functional Mobility Min A with [employer]  Small pivot and hop steps bed to armchair and armchair <> BSC  Initially good follow through of NWB R LE however fatigues easily and with fatigue places minimal weight through R LE  Min A  Using ww for limited distance from [facility] to chair SBA

## 2021-07-28 NOTE — CARE COORDINATION
Social Work:    Social service learned this a.m. that Jim Lewis opted to go home with [facility] instead of rehab at [facility]. Social service canceled the referral to [address] [place] [address]. A call was placed to Jim Lewis who advised that she has a wheeled walker and shower bench but would like a 3-1 commode and therapy. Social work provided choices for [employer] and Jim Lewis had no agency preference. A referral was given to both [facility] and [facility].       Electronically signed by BOYD Solano on 7/28/2021 at 9:37 AM

## 2021-07-28 NOTE — DISCHARGE SUMMARY
[address] [address]                               DISCHARGE SUMMARY    PATIENT NAME: Lacey Burnett                    :        1955  MED REC NO:   [id number]                            ROOM:       9323  ACCOUNT NO:   [de-identified]                           ADMIT DATE: 2021  PROVIDER:     DO Vi Olivera Arturo [facility]no [place] DATE: 2021    DATE OF ADMISSION:  2021    CHIEF COMPLAINT:  Right hip pain secondary to femoral neck fracture. HISTORY OF PRESENT ILLNESS:  This is a 58-year-old female that presented  to my office on the date of admission with pain in the right hip that  resulted following a fall on , during which time she had tripped  over a fan onto her right hip. She had been ambulating on this hip  since the time of the accident; however, pain became severe prompting  her ultimately to come to my office for further evaluation. While in  the office, x-rays showed femoral neck fracture and from this point, she  was sent to the emergency room for probable admission and surgical  correction. She was otherwise free of any other symptoms. CONSULTS:  Dr. Tio Lewis was consulted regarding the patient's hip  fracture. Recommendation was for closed reduction and percutaneous  pinning. Procedure was performed on  without complications. The  patient did well postoperatively and Ortho recommended followup in two  weeks. History as above. HOSPITAL COURSE AND TREATMENT:  Hospital course per my visits were  uneventful. The patient was free of any chest pain, shortness of  breath, nausea, vomiting, headaches, or dizziness and pain was  controlled with oral medications. Physical and Occupational Therapy saw  the patient. Recommendation was for subacute rehab. Arrangements were  made for transfer to [address] [place] [address] when appropriate.   The patient was  doing well and was able to bear weight; therefore, once bed was  available, the patient was transferred to rehab for continued physical  therapy. CONDITION ON DISCHARGE:  Good. DISCHARGE INSTRUCTIONS:  She would resume all routine home medications. Norco for pain and physical therapy at the recommendations of the  therapist at the rehab facility. She was asked to follow up in my  office in two weeks' time, during which time we would reassess her  weight loss and most likely reschedule DEXA scan in that this patient  has been losing weight. PRIMARY DIAGNOSES:  1. Right femoral neck fracture status post closed reduction and  percutaneous pinning. 2.  History of cerebrovascular accident. 3.  Protein-calorie malnutrition. Total time spent with the patient including discharge, discharge  summary, and instructions greater than or equal to half hour.         Haile Merino DO    D: 07/27/2021 7:45:08       T: 07/27/2021 7:50:08     [email or web address]  Job#: [id number]     Doc#: [id number]    CC:

## 2021-08-04 NOTE — PROGRESS NOTES
Physician Progress Note      Zoie Hobbs  [facility] #:                  [id number]  :                       1955  ADMIT DATE:       2021 4:53 PM  [address] [place] DATE:        2021 5:12 PM  RESPONDING  PROVIDER #:        Mehreen Vasquez DO          QUERY TEXT:    Dear Attending Physician,    Patient admitted with Rt hip fracture . Noted documentation of \"protein   calorie malnutrition\" on  per PCP . In order to support the diagnosis of   \"protein calorie malnutrition\" , please include additional clinical indicators   in your documentation. Or please document if the diagnosis of  \"protein   calorie malnutrition \" has been ruled out after further study and if the pt is   only underweight. The medical record reflects the following:  Risk Factors: BMI 18.6 ,Rt hip fracture  Clinical Indicators: Per Dietician assessment,\". . Malnutrition   Assessment:Malnutrition Status:  No malnutrition  . ... BMI Categories:   Underweight (BMI less than 22) age over 72 . .. Nutrition Diagnosis: Increased   nutrient needs related to acute injury/trauma (R hip fracture had surgery   today) as evidenced by intake 26-50%. ...will start ONS to help with healing   and optimizing nutrient needs. . \"  Treatment: Continue current Regular diet and ONS    Thank you,  Sommer Coyle RN [facility]  Clinical Documentation Improvement Specialist  [phone]  Options provided:  -- Protein calorie malnutrition present as evidenced by, Please document   evidence ,along with, the severity of protein calorie malnutrition  -severe,   moderate or mild. -- Pt is underweight only and protein calorie malnutrition was ruled out  -- Other - I will add my own diagnosis  -- Disagree - Not applicable / Not valid  -- Disagree - Clinically unable to determine / Unknown  -- Refer to Clinical Documentation Reviewer    PROVIDER RESPONSE TEXT:    Provider is clinically unable to determine a response to this query.     Query created by: Akira Suh, Francisco Aguila on 8/2/2021 11:11 AM      Electronically signed by:  Tiffanie Whaley DO 8/4/2021 5:14 PM

## 2021-09-20 ENCOUNTER — HOSPITAL ENCOUNTER (OUTPATIENT)
Dept: PHYSICAL THERAPY | Age: 66
Setting detail: THERAPIES SERIES
Discharge: HOME | End: 2021-09-20
Payer: COMMERCIAL

## 2021-09-20 PROCEDURE — 97161 PT EVAL LOW COMPLEX 20 MIN: CPT

## 2021-09-20 NOTE — PROGRESS NOTES
Physical Therapy  Initial Assessment  Date: 2021  Patient Name: Timbo Sauer  MRN: [id number]  : 1955          Restrictions       Subjective   General  Chart Reviewed: Yes  Patient assessed for rehabilitation services?: Yes  Additional Pertinent Hx: Patient presents to PT to assess for rehab s/p Right hip closed reduction with pinning. per Dr Toi Lewis 2021. Patient apparently suffered an acute Right Femoral neck fx in a fall at home 2021 PMHx CVA in 2021 with left side aqffected. Patient was using a walker at the time of her fall secondary to the CVA  Family / Caregiver Present: No  Referring Practitioner: Dr Toi Lewis  Referral Date : 21  Diagnosis: s/p Right Hip fx  Follows Commands: Within Functional Limits  PT Visit Information  Onset Date: 21 (date of surgery)  PT Insurance Information: [employer]  Subjective  Subjective: Patient reports no post op complications Minima to no pain affecting the right hip region Patient is PWB at present uysing a 4 wheel walker  Pain Screening  Patient Currently in Pain: No  Vital Signs  Patient Currently in Pain: No    Vision/Hearing  Vision  Vision: Within Functional Limits  Hearing  Hearing: Within functional limits    Orientation  Orientation  Overall Orientation Status: Within Functional Limits    Social/Functional History  Social/Functional History  Lives With: Alone  Type of Home: [address] [place] [address] Drive: One level  Home Access: Ramped entrance  Home Equipment: 4 wheeled walker; Wheelchair-manual;Rolling walker  Receives Help From: Family  Homemaking Responsibilities: Yes  Active : No  Mode of Transportation: Car  Occupation: Unemployed; On disability    Objective     Observation/Palpation  Posture: Fair  Observation: Patient stands with modest bilateral knee flexion Patient maintains knee flexion during ambulation. Patient ambulates using a 4 wheeled walker.  Gait is reciprocal with a narrowed base of support and reduced bilateral step length. AROM RLE (degrees)  RLE AROM: WFL  RLE General AROM: No pain reported with right hip ROM  AROM LLE (degrees)  LLE AROM : WFL    Strength RLE  Comment: 4- to 4/5  Strength LLE  Comment: 3 to 3+/5  Strength Other  Other: trunk/core 3+/5  Tone RLE  RLE Tone: Normotonic  Tone LLE  LLE Tone: Hypotonic  Motor Control  Gross Motor?: WFL        Bed mobility  Bridging: Modified independent   Supine to Sit: Modified independent  Sit to Supine: Modified independent  Transfers  Sit to Stand: Independent  Stand to sit: Independent       Ambulation  Ambulation?: Yes  Ambulation 1  Surface: level tile  Device: Rolling Walker  Assistance: Modified Independent  Gait Deviations: Slow Tara;Decreased step length;Decreased step height  Stairs/Curb  Stairs?: No                            Assessment   Conditions Requiring Skilled Therapeutic Intervention  Body structures, Functions, Activity limitations: Decreased functional mobility ; Decreased sensation;Decreased balance;Decreased strength;Decreased endurance;Decreased coordination  Assessment: s/p Right Hip Fx Right hip issues are complicated by lasting effects of a CVA suffered in Jan 2021 which affected the strength coordination nad sensation on the left LE  Prognosis: Fair  Decision Making: Low Complexity  REQUIRES PT FOLLOW UP: Yes         Plan   Plan  Times per week: 2  Plan weeks: 6  Current Treatment Recommendations: Strengthening, Neuromuscular Re-education, Home Exercise Program, Safety Education & Training, Balance Training, Endurance Training, Patient/Caregiver Education & Training, Functional Mobility Training, Gait Training    G-Code       OutComes Score                                                  AM-PAC Score             Goals          Therapy Time   Individual Concurrent Group Co-treatment   Time In 0900         Time Out 0935         Minutes 35         Timed Code Treatment Minutes: [address], PT

## 2021-09-20 NOTE — FLOWSHEET NOTE
[facility]  Phone: [phone] Fax: [phone]     Physical Therapy  Out Patient Initial Evaluation    Date:  2021    Patient Name:  Robinson Goldberg   :  1955 MRN: [id number]    DIAGNOSIS:  Right Hip Fx s/p Percutaneous Pinning   EVALUATION DATE:  2021  REFERRING PHYSICIAN:  Dr Renzo Casey:  2021    PROBLEMS FOUND DURING EVALUATION  · Dependent Gait with limited right LE weight bearing  · Deficits of balance with both static and dynamic activity  · Deficits of strength Bilateral LE's   · Impaired coordination     SHORT TERM GOALS  · Patient will be able to engage in consistent and progressive active exercise while reporting no increase in balance issues or right hip pain   · Establish HEP    LONG TERM GOALS  · Patient will demonstrate FWB on the right LE Patient will exhibit a Fair or better quality of gait over functional distances  · Balance with ADL's and ambulation will be Fair or better  · Strength Bilateral LE's 4/5 or better  · Coordination with ambulation and ADL's will be Fair or better  · Patient will be able to maintain and self direct an appropriate follow up independent exercise program     PATIENT GOALS  · Regain functional mobility and strength in the right LE    REHAB POTENTIAL:  Fair    FREQUENCY/DURATION:  2 times per week 6 weeks     PLAN OF CARE:  Progressive exercise Balance activity Gait training HEP       Thank you for the opportunity to work with your patient. If you have questions or comments, please feel free to contact me by phone or fax.       Electronically Signed by Sharry Aase, PT  [id number]        ___________________________________  2021    Physician     Date

## 2021-10-12 ENCOUNTER — HOSPITAL ENCOUNTER (OUTPATIENT)
Dept: PHYSICAL THERAPY | Age: 66
Setting detail: THERAPIES SERIES
Discharge: HOME | End: 2021-10-12
Payer: COMMERCIAL

## 2021-10-12 PROCEDURE — 97110 THERAPEUTIC EXERCISES: CPT

## 2021-10-12 NOTE — PROGRESS NOTES
[facility]  Phone: [phone] Fax: [phone]       Physical Therapy Daily Treatment Note  Date:  10/12/2021    Patient Name:  Eduard Kelley    :  1955  MRN: [id number]    Restrictions/Precautions:    Diagnosis:  Right Hip Fx, S/P Percutaneous Pinning (2021)  Treatment Diagnosis:    Insurance/Certification information: [facility] (48 units thru 2021)  Referring Physician:  Dr. Emory Valdivia of care signed (Y/N):    Visit# / total visits:    Pain level: /10  Time In: 9:25      Time Out: 10:15         Subjective:  Pt reports no right hip pain. States issues with LLE related to prior CVA. Pt reports she uses rollator walker for most ambulation and walks short distances in her apartment without AD. Cautioned pt for safety in ambulating. Exercises:  Exercise/Equipment Resistance/Repetitions Other comments   Bike  7 min     Calf stretch 10\" 5 x @ incline   Marching in place  10 x B alt    Side stepping  3 laps           Step ups  nt           Heel slides    10 x B  w/slider   Hip abduction w/sliders 10 x B supine   SAQ 15 x B    Bridging  10 x           Sit to stand from 24\" box 10 x SBA   LAQ 2# 2 x 10 B                                         Other Therapeutic Activities:  Ambulated in //bars using 1 HH on rail x 4 laps with focus on heel toe mechanics. Pt maintains flexed left knee in gait which she reports she has walked that way for years. Cued pt to lock rollator for safe sit><stand transfers. Home Exercise Program:  (10/12/2021) Pt is performing ex per home care PT    Manual Treatments:      Modalities:      Timed Code Treatment Minutes:  45    Total Treatment Minutes:  50    Treatment/Activity Tolerance:  [x] Patient tolerated treatment well [] Patient limited by fatigue  [] Patient limited by pain  [] Patient limited by other medical complications  [x] Other: Good tolerance for exercise/activity.      Plan:   [x] Continue per plan of care [] Alter current plan (see comments)  [] Plan of care initiated [] Hold pending MD visit [] Discharge  Plan for Next Session:         Treatment Charges: Mins Units   Initial Evaluation     Re-Evaluation     Ther Exercise         TE 40 3   Manual Therapy     MT     Ther Activities        TA 5 1   Gait Training          GT     Neuro Re-education NR     Modalities     Non-Billable Service Time     Other 5    Total Time/Units 50 3     Electronically signed by:   Kt Beasley

## 2021-10-14 ENCOUNTER — APPOINTMENT (OUTPATIENT)
Dept: PHYSICAL THERAPY | Age: 66
End: 2021-10-14
Payer: COMMERCIAL

## 2021-10-19 ENCOUNTER — HOSPITAL ENCOUNTER (OUTPATIENT)
Dept: PHYSICAL THERAPY | Age: 66
Setting detail: THERAPIES SERIES
Discharge: HOME | End: 2021-10-19
Payer: COMMERCIAL

## 2021-10-19 NOTE — FLOWSHEET NOTE
[facility]  Phone: [phone] Fax: [phone]     Physical Therapy  Cancellation/No-show Note  Patient Name:  Jaciel Mac  :  1955   Date:  10/19/2021    For today's appointment patient:  [x]  Cancelled  []  Rescheduled appointment  []  No-show     Reason given by patient:  []  Patient ill  []  Conflicting appointment  [x]  No transportation    []  Conflict with work  []  No reason given  []  Other:     Comments:      Electronically signed by:   Kt Beasley

## 2021-10-21 ENCOUNTER — HOSPITAL ENCOUNTER (OUTPATIENT)
Dept: PHYSICAL THERAPY | Age: 66
Setting detail: THERAPIES SERIES
Discharge: HOME | End: 2021-10-21
Payer: COMMERCIAL

## 2021-10-21 PROCEDURE — 97110 THERAPEUTIC EXERCISES: CPT

## 2021-10-21 NOTE — PROGRESS NOTES
[facility]  Phone: [phone] Fax: [phone]       Physical Therapy Daily Treatment Note  Date:  10/21/2021    Patient Name:  John Garvin    :  1955  MRN: [id number]    Restrictions/Precautions:    Diagnosis:  Right Hip Fx, S/P Percutaneous Pinning (2021)  Treatment Diagnosis:    Insurance/Certification information: [facility] (48 units thru 2021)  Referring Physician:  Dr. Francisco Kerr of care signed (Y/N):    Visit# / total visits:    Pain level: /10  Time In: 7:50      Time Out: 8:30         Subjective:  Pt states she is seeing the foot doctor today and plans to ask about a brace for her left foot. States she feels her left foot will turn on her. As pt initiated therapy she received a call from her transportation notifying her of earlier  time limiting today's session. Exercises:  Exercise/Equipment Resistance/Repetitions Other comments   Bike  10 min     Calf stretch 10\" 5 x @ incline   Marching in place  10 x B alt    Side stepping  3 laps           Step ups  4\" 5 x B CGA                 Sit to stand from 24\" box 10 x SBA   LAQ  2 x 10 B                                         Other Therapeutic Activities:  Pt ambulated out to front entrance, down decline to sidewalk access with verbal and tactile cue for control and safety in negotiating decline. She lifts her walker into transport van. Provided CGA and cued pt to allow assist with managing her walker for safety. Pt maintains flexed left knee in gait. LLE migrates in as well.        Home Exercise Program:  (10/12/2021) Pt is performing ex per home care PT    Manual Treatments:      Modalities:      Timed Code Treatment Minutes:  40    Total Treatment Minutes:  40    Treatment/Activity Tolerance:  [x] Patient tolerated treatment well [] Patient limited by fatigue  [] Patient limited by pain  [] Patient limited by other medical complications  [x] Other: Pt demonstrates good endurance for activities. Pt is reporting no pain in right hip. Limitations in gait related to LLE deficits from prior CVA. Plan:   [x] Continue per plan of care [] Alter current plan (see comments)  [] Plan of care initiated [] Hold pending MD visit [] Discharge  Plan for Next Session:         Treatment Charges: Mins Units   Initial Evaluation     Re-Evaluation     Ther Exercise         TE 35 2   Manual Therapy     MT     Ther Activities        TA 5 --   Gait Training          GT     Neuro Re-education NR     Modalities     Non-Billable Service Time     Other     Total Time/Units 40 2     Electronically signed by:   Kt Beasley

## 2021-10-26 ENCOUNTER — HOSPITAL ENCOUNTER (OUTPATIENT)
Dept: PHYSICAL THERAPY | Age: 66
Setting detail: THERAPIES SERIES
End: 2021-10-26
Payer: COMMERCIAL

## 2021-11-05 ENCOUNTER — HOSPITAL ENCOUNTER (OUTPATIENT)
Dept: PHYSICAL THERAPY | Age: 66
Setting detail: THERAPIES SERIES
Discharge: HOME | End: 2021-11-05
Payer: COMMERCIAL

## 2021-11-05 PROCEDURE — 97110 THERAPEUTIC EXERCISES: CPT

## 2021-11-05 NOTE — PROGRESS NOTES
[facility]  Phone: [phone] Fax: [phone]       Physical Therapy Daily Treatment Note  Date:  2021    Patient Name:  El Mirza    :  1955  MRN: [id number]    Restrictions/Precautions:    Diagnosis:  Right Hip Fx, S/P Percutaneous Pinning (2021)  Treatment Diagnosis:    Insurance/Certification information: [facility]  Referring Physician:  Dr. Katherine Moe of care signed (Y/N):    Visit# / total visits:  3/12  Pain level: /10  Time In: 0900      Time Out: 945         Subjective:      Exercises:  Exercise/Equipment Resistance/Repetitions Other comments   Bike  10 min     Calf stretch 10\" 5 x @ incline   Marching in place  10 x B alt    Side stepping  3 laps           Step ups  4\" 5 x B CGA     Bridge 5 reps 2 sets    5 reps 2 sets Isometric hip abd5 reps 2 sets 5 reps 2 sets         Stand to squat 5 reps 2 sets     LAQ  2 x 10 B                                         Other Therapeutic Activities:        Home Exercise Program:  (10/12/2021) Pt is performing ex per home care PT    Manual Treatments:      Modalities:      Timed Code Treatment Minutes:  40    Total Treatment Minutes:  40    Treatment/Activity Tolerance:  [x] Patient tolerated treatment well [] Patient limited by fatigue  [] Patient limited by pain  [] Patient limited by other medical complications  [x] Other: Pt demonstrates good endurance for activities. Pt is reporting no pain in right hip. Limitations in gait related to LLE deficits from prior CVA.      Plan:   [x] Continue per plan of care [] Alter current plan (see comments)  [] Plan of care initiated [] Hold pending MD visit [] Discharge  Plan for Next Session:         Treatment Charges: Mins Units   Initial Evaluation     Re-Evaluation     Ther Exercise         TE 35 2   Manual Therapy     MT     Ther Activities        TA 5 --   Gait Training          GT     Neuro Re-education NR     Modalities Non-Billable Service Time     Other     Total Time/Units 40 2     Electronically signed by:  Josue King, [address] [facility]

## 2021-11-09 ENCOUNTER — HOSPITAL ENCOUNTER (OUTPATIENT)
Dept: PHYSICAL THERAPY | Age: 66
Setting detail: THERAPIES SERIES
Discharge: HOME | End: 2021-11-09
Payer: COMMERCIAL

## 2021-11-09 PROCEDURE — 97110 THERAPEUTIC EXERCISES: CPT

## 2021-11-09 NOTE — PROGRESS NOTES
[facility]  Phone: [phone] Fax: [phone]       Physical Therapy Daily Treatment Note  Date:  2021    Patient Name:  Lauren Hernández    :  1955  MRN: [id number]    Restrictions/Precautions:    Diagnosis:  Right Hip Fx, S/P Percutaneous Pinning (2021)  Treatment Diagnosis:    Insurance/Certification information: [facility] thru [facility]  Referring Physician:  Dr. Cyndy Gama of care signed (Y/N):    Visit# / total visits:    Pain level: /10  Time In: 0900      Time Out: 945         Subjective:      Exercises:  Exercise/Equipment Resistance/Repetitions Other comments   Bike  10 min     Calf stretch 10\" 5 x @ incline   Marching in place  10 x B alt    Side stepping  3 laps           Step ups  4\" 5 x B CGA     Bridge 5 reps 2 sets    5 reps 2 sets Isometric hip abd5 reps 2 sets 5 reps 2 sets         Stand to squat 5 reps 2 sets     LAQ  2 x 10 B nt                                        Other Therapeutic Activities:        Home Exercise Program:  (10/12/2021) Pt is performing ex per home care PT    Manual Treatments:      Modalities:      Timed Code Treatment Minutes:  40    Total Treatment Minutes:  40    Treatment/Activity Tolerance:  [x] Patient tolerated treatment well [] Patient limited by fatigue  [] Patient limited by pain  [] Patient limited by other medical complications  [x] Other:     Plan:   [x] Continue per plan of care [] Alter current plan (see comments)  [] Plan of care initiated [] Hold pending MD visit [] Discharge  Plan for Next Session:         Treatment Charges: Mins Units   Initial Evaluation     Re-Evaluation     Ther Exercise         TE 35 2   Manual Therapy     MT     Ther Activities        TA 5 --   Gait Training          GT     Neuro Re-education NR     Modalities     Non-Billable Service Time     Other     Total Time/Units 40 2     Electronically signed by:  Da Zhao, 311 [facility]

## 2021-11-12 ENCOUNTER — HOSPITAL ENCOUNTER (OUTPATIENT)
Dept: PHYSICAL THERAPY | Age: 66
Setting detail: THERAPIES SERIES
Discharge: HOME | End: 2021-11-12
Payer: COMMERCIAL

## 2021-11-12 PROCEDURE — 97110 THERAPEUTIC EXERCISES: CPT

## 2021-11-12 NOTE — PROGRESS NOTES
[facility]  Phone: [phone] Fax: [phone]       Physical Therapy Daily Treatment Note  Date:  2021    Patient Name:  Christen Batista    :  1955  MRN: [id number]    Restrictions/Precautions:    Diagnosis:  Right Hip Fx, S/P Percutaneous Pinning (2021)  Treatment Diagnosis:    Ins[facility]/Certification [facility]: [facility] thru 2021[facility]  Referring Physician:  Dr. Woods Crews of care signed (Y/N):    Visit# / total visits:    Pain level: /10  Time In: 0900      Time Out: 945         Subjective:      Exercises:  Exercise/Equipment Resistance/Repetitions Other comments   Bike  10 min     Calf stretch 10\" 5 x @ incline   Marching in place  10 x B alt    Side stepping  3 laps           Step ups  4\" 5 x B CGA     Bridge 5 reps 2 sets    5 reps 2 sets Isometric hip abd5 reps 2 sets 5 reps 2 sets         Stand to squat 5 reps 2 sets     LAQ  2 x 10 B nt                                        Other Therapeutic Activities:        Home Exercise Program:  (10/12/2021) Pt is performing ex per home care PT    Manual Treatments:      Modalities:      Timed Code Treatment Minutes:  40    Total Treatment Minutes:  40    Treatment/Activity Tolerance:  [x] Patient tolerated treatment well [] Patient limited by fatigue  [] Patient limited by pain  [] Patient limited by other medical complications  [x] Other:     Plan:   [x] Continue per plan of care [] Alter current plan (see comments)  [] Plan of care initiated [] Hold pending MD visit [] Discharge  Plan for Next Session:         Treatment Charges: Mins Units   Initial Evaluation     Re-Evaluation     Ther Exercise         TE 35 2   Manual Therapy     MT     Ther Activities        TA 5 --   Gait Training          GT     Neuro Re-education NR     Modalities     Non-Billable Service Time     Other     Total Time/Units 40 2     Electronically signed by:  Deb Schroeder, [address] [facility]

## 2021-11-16 ENCOUNTER — HOSPITAL ENCOUNTER (OUTPATIENT)
Dept: PHYSICAL THERAPY | Age: 66
Setting detail: THERAPIES SERIES
Discharge: HOME | End: 2021-11-16
Payer: COMMERCIAL

## 2021-11-16 PROCEDURE — 97110 THERAPEUTIC EXERCISES: CPT

## 2021-11-16 NOTE — PROGRESS NOTES
[facility]  Phone: [phone] Fax: [phone]       Physical Therapy Daily Treatment Note  Date:  2021    Patient Name:  Kinsey Najera   :  1955 MRN: [id number]    Restrictions/Precautions:    Diagnosis:  Right Hip Fx, S/P Percutaneous Pinning (2021)  Treatment Diagnosis:    Insurance/Certification information: [employer] (48 units thru 2021)  Referring Physician:  Dr. Urbina Sports of care signed (Y/N):    Visit# / total visits:    Pain level: /10  Time In: 930      Time Out: 1015        Subjective:      Exercises:  Exercise/Equipment Resistance/Repetitions Other comments   Bike  10 min     Calf stretch 10\" 5 x @ incline   Marching in place  10 x B alt    Side stepping  3 laps           Step ups  4\" 5 x B CGA     Bridge 5 reps 2 sets    5 reps 2 sets Isometric hip abd5 reps 2 sets 5 reps 2 sets         Stand to squat 5 reps 2 sets     LAQ  2 x 10 B nt                                        Other Therapeutic Activities:        Home Exercise Program:  (10/12/2021) Pt is performing ex per home care PT    Manual Treatments:      Modalities:      Timed Code Treatment Minutes:  40    Total Treatment Minutes:  40    Treatment/Activity Tolerance:  [x] Patient tolerated treatment well [] Patient limited by fatigue  [] Patient limited by pain  [] Patient limited by other medical complications  [x] Other:     Plan:   [x] Continue per plan of care [] Alter current plan (see comments)  [] Plan of care initiated [] Hold pending MD visit [] Discharge  Plan for Next Session:         Treatment Charges: Mins Units   Initial Evaluation     Re-Evaluation     Ther Exercise         TE 35 2   Manual Therapy     MT     Ther Activities        TA 5 --   Gait Training          GT     Neuro Re-education NR     Modalities     Non-Billable Service Time     Other     Total Time/Units 40 2     Electronically signed by:  Ousmane Capellan, [address] [place] [address]

## 2021-11-19 ENCOUNTER — HOSPITAL ENCOUNTER (OUTPATIENT)
Dept: PHYSICAL THERAPY | Age: 66
Setting detail: THERAPIES SERIES
Discharge: HOME | End: 2021-11-19
Payer: COMMERCIAL

## 2021-11-19 PROCEDURE — 97110 THERAPEUTIC EXERCISES: CPT

## 2021-11-19 NOTE — PROGRESS NOTES
[facility]  Phone: [phone] Fax: [phone]       Physical Therapy Daily Treatment Note  Date:  2021    Patient Name:  Kristian Low   :  1955 MRN: [id number]    Restrictions/Precautions:    Diagnosis:  Right Hip Fx, S/P Percutaneous Pinning (2021)  Treatment Diagnosis:    Insurance/Certification information: [facility]  Referring Physician:  Dr. Anil Howard of care signed (Y/N):    Visit# / total visits:    Pain level: /10  Time In: 0930      Time Out: 1015        Subjective:      Exercises:  Exercise/Equipment Resistance/Repetitions Other comments   Bike  10 min     Calf stretch 10\" 5 x @ incline   Marching in place  10 x B alt    Side stepping  3 laps           Step ups  4\" 5 x B CGA     Bridge 5 reps 2 sets    5 reps 2 sets Isometric hip abd5 reps 2 sets 5 reps 2 sets         Stand to squat 5 reps 2 sets     Standing hip/knee flex  5 reps                                          Other Therapeutic Activities:        Home Exercise Program:  (10/12/2021) Pt is performing ex per home care PT    Manual Treatments:      Modalities:      Timed Code Treatment Minutes:  40    Total Treatment Minutes:  40    Treatment/Activity Tolerance:  [x] Patient tolerated treatment well [] Patient limited by fatigue  [] Patient limited by pain  [] Patient limited by other medical complications  [x] Other:     Plan:   [x] Continue per plan of care [] Alter current plan (see comments)  [] Plan of care initiated [] Hold pending MD visit [] Discharge  Plan for Next Session:         Treatment Charges: Mins Units   Initial Evaluation     Re-Evaluation     Ther Exercise         TE 35 2   Manual Therapy     MT     Ther Activities        TA 5 --   Gait Training          GT     Neuro Re-education NR     Modalities     Non-Billable Service Time     Other     Total Time/Units 40 2     Electronically signed by:  Quin Gibson, [address] [facility]

## 2021-12-01 ENCOUNTER — HOSPITAL ENCOUNTER (OUTPATIENT)
Dept: PHYSICAL THERAPY | Age: 66
Setting detail: THERAPIES SERIES
Discharge: HOME | End: 2021-12-01
Payer: COMMERCIAL

## 2021-12-01 PROCEDURE — 97110 THERAPEUTIC EXERCISES: CPT

## 2021-12-01 NOTE — PROGRESS NOTES
[facility]  Phone: [phone] Fax: [phone]       Physical Therapy Daily Treatment Note  Date:  2021    Patient Name:  El Mirza   :  1955 MRN: [id number]    Restrictions/Precautions:    Diagnosis:  Right Hip Fx, S/P Percutaneous Pinning (2021)  Treatment Diagnosis:    Insurance/Certification information: [facility] thru [facility]  Referring Physician:  Dr. Katherine Moe of care signed (Y/N):    Visit# / total visits:    Pain level: /10  Time In: 930      Time Out: 1015        Subjective:      Exercises:  Exercise/Equipment Resistance/Repetitions Other comments   Bike  10 min     Calf stretch 10\" 5 x @ incline   Marching in place  10 x B alt    Side stepping  3 laps           Step ups  4\" 5 x B CGA     Bridge 5 reps 2 sets    5 reps 2 sets Isometric hip abd5 reps 2 sets 5 reps 2 sets         Stand to squat 5 reps 2 sets     Standing hip/knee flex  5 reps      KNee flex/ext  5 reps bilateral                                   Other Therapeutic Activities:        Home Exercise Program:  (10/12/2021) Pt is performing ex per home care PT    Manual Treatments:      Modalities:      Timed Code Treatment Minutes:  40    Total Treatment Minutes:  40    Treatment/Activity Tolerance:  [x] Patient tolerated treatment well [] Patient limited by fatigue  [] Patient limited by pain  [] Patient limited by other medical complications  [x] Other:     Plan:   [x] Continue per plan of care [] Alter current plan (see comments)  [] Plan of care initiated [] Hold pending MD visit [] Discharge  Plan for Next Session:         Treatment Charges: Mins Units   Initial Evaluation     Re-Evaluation     Ther Exercise         TE 35 2   Manual Therapy     MT     Ther Activities        TA 5 --   Gait Training          GT     Neuro Re-education NR     Modalities     Non-Billable Service Time     Other     Total Time/Units 40 2     Electronically signed by:  Jian Campa PT [id number]

## 2021-12-03 ENCOUNTER — HOSPITAL ENCOUNTER (OUTPATIENT)
Dept: PHYSICAL THERAPY | Age: 66
Setting detail: THERAPIES SERIES
Discharge: HOME | End: 2021-12-03
Payer: COMMERCIAL

## 2021-12-03 PROCEDURE — 97110 THERAPEUTIC EXERCISES: CPT

## 2021-12-03 NOTE — PROGRESS NOTES
[facility]  Phone: [phone] Fax: [phone]       Physical Therapy Daily Treatment Note  Date:  12/3/2021    Patient Name:  Barbara Aquino   :  1955 MRN: [id number]    Restrictions/Precautions:    Diagnosis:  Right Hip Fx, S/P Percutaneous Pinning (2021)  Treatment Diagnosis:    [facility]  Referring Physician:  Dr. Solitario Morfin of care signed (Y/N):    Visit# / total visits:    Pain level: /10  Time In: 830      Time Out: 915        Subjective:      Exercises:  Exercise/Equipment Resistance/Repetitions Other comments   Bike  10 min     Calf stretch 10\" 5 x @ incline   Marching in place  10 x B alt    Side stepping  3 laps           Step ups  4\" 5 x B CGA     Bridge 5 reps 2 sets    5 reps 2 sets Isometric hip abd5 reps 2 sets 5 reps 2 sets         Stand to squat 5 reps 2 sets     Standing hip/knee flex  5 reps      KNee flex/ext  5 reps bilateral                                   Other Therapeutic Activities:        Home Exercise Program:  (10/12/2021) Pt is performing ex per home care PT    Manual Treatments:      Modalities:      Timed Code Treatment Minutes:  40    Total Treatment Minutes:  40    Treatment/Activity Tolerance:  [x] Patient tolerated treatment well [] Patient limited by fatigue  [] Patient limited by pain  [] Patient limited by other medical complications  [x] Other:     Plan:   [x] Continue per plan of care [] Alter current plan (see comments)  [] Plan of care initiated [] Hold pending MD visit [] Discharge  Plan for Next Session:         Treatment Charges: Mins Units   Initial Evaluation     Re-Evaluation     Ther Exercise         TE 35 2   Manual Therapy     MT     Ther Activities        TA 5 --   Gait Training          GT     Neuro Re-education NR     Modalities     Non-Billable Service Time     Other     Total Time/Units 40 2     Electronically signed by:  Dimitris Salgado PT [id number]

## 2021-12-06 ENCOUNTER — HOSPITAL ENCOUNTER (OUTPATIENT)
Dept: PHYSICAL THERAPY | Age: 66
Setting detail: THERAPIES SERIES
Discharge: HOME | End: 2021-12-06
Payer: COMMERCIAL

## 2021-12-06 PROCEDURE — 97110 THERAPEUTIC EXERCISES: CPT

## 2021-12-06 NOTE — PROGRESS NOTES
[facility]  Phone: [phone] Fax: [phone]       Physical Therapy Daily Treatment Note  Date:  2021    Patient Name:  Samson Aburto   :  1955 MRN: [id number]    Restrictions/Precautions:    Diagnosis:  Right Hip Fx, S/P Percutaneous Pinning (2021)  Treatment Diagnosis:    Ins[facility]Certification [facility]   Referring Physician:  Dr. Eddie Russ of care signed (Y/N):    Visit# / total visits:  10/12  Pain level: /10  Time In: 830      Time Out: 915        Subjective:      Exercises:  Exercise/Equipment Resistance/Repetitions Other comments   Bike  10 min     Calf stretch 10\" 5 x @ incline   Marching in place  10 x B alt    Side stepping  3 laps           Step ups  4\" 5 x B CGA     Bridge 5 reps 2 sets    5 reps 2 sets Isometric hip abd5 reps 2 sets 5 reps 2 sets         Stand to squat 5 reps 2 sets     Standing hip/knee flex  5 reps      KNee flex/ext  5 reps bilateral                                   Other Therapeutic Activities:        Home Exercise Program:  (10/12/2021) Pt is performing ex per home care PT    Manual Treatments:      Modalities:      Timed Code Treatment Minutes:  40    Total Treatment Minutes:  40    Treatment/Activity Tolerance:  [x] Patient tolerated treatment well [] Patient limited by fatigue  [] Patient limited by pain  [] Patient limited by other medical complications  [x] Other:     Plan:   [x] Continue per plan of care [] Alter current plan (see comments)  [] Plan of care initiated [] Hold pending MD visit [] Discharge  Plan for Next Session:         Treatment Charges: Mins Units   Initial Evaluation     Re-Evaluation     Ther Exercise         TE 35 2   Manual Therapy     MT     Ther Activities        TA 5 --   Gait Training          GT     Neuro Re-education NR     Modalities     Non-Billable Service Time     Other     Total Time/Units 40 2     Electronically signed by:  Kevin Harris Kiarra, [address] [facility]

## 2021-12-10 ENCOUNTER — HOSPITAL ENCOUNTER (OUTPATIENT)
Dept: PHYSICAL THERAPY | Age: 66
Setting detail: THERAPIES SERIES
Discharge: HOME | End: 2021-12-10
Payer: COMMERCIAL

## 2021-12-10 PROCEDURE — 97110 THERAPEUTIC EXERCISES: CPT

## 2021-12-10 NOTE — PROGRESS NOTES
[facility]  Phone: [phone] Fax: [phone]       Physical Therapy Daily Treatment Note  Date:  12/10/2021    Patient Name:  Mary Alice Adan   :  1955 MRN: [id number]    Restrictions/Precautions:    Diagnosis:  Right Hip Fx, S/P Percutaneous Pinning (2021)  Treatment Diagnosis:    [facility]   Referring Physician:  Dr. Ghulam Mcpherson of care signed (Y/N):    Visit# / total visits:    Pain level: /10  Time In: 830      Time Out: 915        Subjective:      Exercises:  Exercise/Equipment Resistance/Repetitions Other comments   Bike  10 min     Calf stretch 10\" 5 x @ incline   Marching in place  10 x B alt    Side stepping  3 laps           Step ups  4\" 5 x B CGA     Bridge 5 reps 2 sets    5 reps 2 sets Isometric hip abd5 reps 2 sets 5 reps 2 sets         Stand to squat 5 reps 2 sets     Standing hip/knee flex  5 reps      KNee flex/ext  5 reps bilateral                                   Other Therapeutic Activities:        Home Exercise Program:  (10/12/2021) Pt is performing ex per home care PT    Manual Treatments:      Modalities:      Timed Code Treatment Minutes:  40    Total Treatment Minutes:  40    Treatment/Activity Tolerance:  [x] Patient tolerated treatment well [] Patient limited by fatigue  [] Patient limited by pain  [] Patient limited by other medical complications  [x] Other:     Plan:   [x] Continue per plan of care [] Alter current plan (see comments)  [] Plan of care initiated [] Hold pending MD visit [] Discharge  Plan for Next Session:         Treatment Charges: Mins Units   Initial Evaluation     Re-Evaluation     Ther Exercise         TE 35 2   Manual Therapy     MT     Ther Activities        TA 5 --   Gait Training          GT     Neuro Re-education NR     Modalities     Non-Billable Service Time     Other     Total Time/Units 40 2     Electronically signed by:  Loree Mar Kiarra, [address] [facility]

## 2021-12-13 ENCOUNTER — HOSPITAL ENCOUNTER (OUTPATIENT)
Dept: PHYSICAL THERAPY | Age: 66
Setting detail: THERAPIES SERIES
Discharge: HOME | End: 2021-12-13
Payer: COMMERCIAL

## 2021-12-13 PROCEDURE — 97110 THERAPEUTIC EXERCISES: CPT

## 2021-12-13 NOTE — PROGRESS NOTES
[facility]  Phone: [phone] Fax: [phone]       Physical Therapy Daily Treatment Note  Date:  2021    Patient Name:  Leon Estrada   :  1955 MRN: [id number]    Restrictions/Precautions:    Diagnosis:  Right Hip Fx, S/P Percutaneous Pinning (2021)  Treatment Diagnosis:    Insurance/Certification information: [facility] thru 2021)   Referring Physician:  Dr. Gita Vargas of care signed (Y/N):    Visit# / total visits:    Pain level: /10  Time In: 830      Time Out: 915        Subjective:      Exercises:  Exercise/Equipment Resistance/Repetitions Other comments   Bike  10 min     Calf stretch 10\" 5 x @ incline   Marching in place  10 x B alt    Side stepping  3 laps           Step ups  4\" 5 x B CGA     Bridge 5 reps 2 sets    5 reps 2 sets Isometric hip abd5 reps 2 sets 5 reps 2 sets         Stand to squat 5 reps 2 sets     Standing hip/knee flex  5 reps      KNee flex/ext  5 reps bilateral                                   Other Therapeutic Activities:        Home Exercise Program:  (10/12/2021) Pt is performing ex per home care PT    Manual Treatments:      Modalities:      Timed Code Treatment Minutes:  40    Total Treatment Minutes:  40    Treatment/Activity Tolerance:  [x] Patient tolerated treatment well [] Patient limited by fatigue  [] Patient limited by pain  [] Patient limited by other medical complications  [] Other:     Plan:   [] Continue per plan of care [] Alter current plan (see comments)  [] Plan of care initiated [] Hold pending MD visit [x] Discharge  Plan for Next Session:         Treatment Charges: Mins Units   Initial Evaluation     Re-Evaluation     Ther Exercise         TE 35 2   Manual Therapy     MT     Ther Activities        TA 5 --   Gait Training          GT     Neuro Re-education NR     Modalities     Non-Billable Service Time     Other     Total Time/Units 40 2     Electronically signed by:  Gerson Meneses

## 2022-11-07 ENCOUNTER — HOSPITAL ENCOUNTER (OUTPATIENT)
Dept: MRI IMAGING | Age: 67
Discharge: HOME | End: 2022-11-09
Payer: COMMERCIAL

## 2022-11-07 DIAGNOSIS — M75.102 ROTATOR CUFF SYNDROME OF LEFT SHOULDER: ICD-10-CM

## 2022-11-07 DIAGNOSIS — M19.012 ARTHRITIS OF LEFT SHOULDER REGION: ICD-10-CM

## 2022-11-07 PROCEDURE — 73221 MRI JOINT UPR EXTREM W/O DYE: CPT

## 2022-12-19 ENCOUNTER — HOSPITAL ENCOUNTER (OUTPATIENT)
Dept: PREADMISSION TESTING | Age: 67
Discharge: HOME | End: 2022-12-19
Payer: COMMERCIAL

## 2022-12-19 VITALS
WEIGHT: 104.5 LBS | HEIGHT: 66 IN | DIASTOLIC BLOOD PRESSURE: 78 MMHG | TEMPERATURE: 98.6 F | SYSTOLIC BLOOD PRESSURE: 134 MMHG | RESPIRATION RATE: 20 BRPM | BODY MASS INDEX: 16.79 KG/M2 | OXYGEN SATURATION: 96 % | HEART RATE: 77 BPM

## 2022-12-19 LAB
ANION GAP SERPL CALCULATED.3IONS-SCNC: 11 MMOL/L (ref 7–16)
BUN BLDV-MCNC: 14 MG/DL (ref 6–23)
CALCIUM SERPL-MCNC: 9.5 MG/DL (ref 8.6–10.2)
CHLORIDE BLD-SCNC: 107 MMOL/L (ref 98–107)
CO2: 25 MMOL/L (ref 22–29)
CREAT SERPL-MCNC: 0.8 MG/DL (ref 0.5–1)
EKG ATRIAL RATE: 74 BPM
EKG P AXIS: 64 DEGREES
EKG P-R INTERVAL: 120 MS
EKG Q-T INTERVAL: 398 MS
EKG QRS DURATION: 98 MS
EKG QTC CALCULATION (BAZETT): 441 MS
EKG R AXIS: -25 DEGREES
EKG T AXIS: 30 DEGREES
EKG VENTRICULAR RATE: 74 BPM
GFR SERPL CREATININE-BSD FRML MDRD: >60 ML/MIN/1.73
GLUCOSE BLD-MCNC: 90 MG/DL (ref 74–99)
HCT VFR BLD CALC: 43.9 % (ref 34–48)
HEMOGLOBIN: 14.2 G/DL (ref 11.5–15.5)
MCH RBC QN AUTO: 29.6 PG (ref 26–35)
MCHC RBC AUTO-ENTMCNC: 32.3 % (ref 32–34.5)
MCV RBC AUTO: 91.6 FL (ref 80–99.9)
PDW BLD-RTO: 13.8 FL (ref 11.5–15)
PLATELET # BLD: 264 E9/L (ref 130–450)
PMV BLD AUTO: 10.5 FL (ref 7–12)
POTASSIUM SERPL-SCNC: 4 MMOL/L (ref 3.5–5)
RBC # BLD: 4.79 E12/L (ref 3.5–5.5)
SODIUM BLD-SCNC: 143 MMOL/L (ref 132–146)
WBC # BLD: 5.7 E9/L (ref 4.5–11.5)

## 2022-12-19 PROCEDURE — 36415 COLL VENOUS BLD VENIPUNCTURE: CPT

## 2022-12-19 PROCEDURE — 85027 COMPLETE CBC AUTOMATED: CPT

## 2022-12-19 PROCEDURE — 87081 CULTURE SCREEN ONLY: CPT

## 2022-12-19 PROCEDURE — 80048 BASIC METABOLIC PNL TOTAL CA: CPT

## 2022-12-19 RX ORDER — NABUMETONE 750 MG/1
750 TABLET, FILM COATED ORAL 2 TIMES DAILY
COMMUNITY

## 2022-12-19 RX ORDER — ASPIRIN 81 MG/1
81 TABLET ORAL DAILY
COMMUNITY

## 2022-12-19 RX ORDER — PREGABALIN 300 MG/1
300 CAPSULE ORAL 2 TIMES DAILY
COMMUNITY

## 2022-12-19 ASSESSMENT — HOOS JR
RISING FROM SITTING: 2
BENDING TO THE FLOOR TO PICK UP OBJECT: 4
HOOS JR RAW SCORE: 19
LYING IN BED (TURNING OVER, MAINTAINING HIP POSITION): 4
SITTING: 4
GOING UP OR DOWN STAIRS: 1
HOOS JR RAW SCORE: 19
WALKING ON UNEVEN SURFACE: 4
HOOS JR TOTAL INTERVAL SCORE: 29.009

## 2022-12-19 ASSESSMENT — PAIN DESCRIPTION - PAIN TYPE: TYPE: CHRONIC PAIN

## 2022-12-19 ASSESSMENT — PAIN DESCRIPTION - ORIENTATION: ORIENTATION: RIGHT

## 2022-12-19 ASSESSMENT — PAIN DESCRIPTION - LOCATION: LOCATION: HIP

## 2022-12-19 NOTE — PROGRESS NOTES
I met with Delonte Acosta this am  for an orthopaedic education class. We discussed information regarding pre, intra, post-op, discharge, and LOS expectations. I provided ortho education materials. I encouraged the patient to call with any questions or concerns.

## 2022-12-19 NOTE — PROGRESS NOTES
[facility] PRE-ADMISSION TESTING INSTRUCTIONS    The Preadmission Testing patient is instructed accordingly using the following criteria (check applicable):    ARRIVAL INSTRUCTIONS:  [x] Parking the day of Surgery is located in the Main Entrance lot. Upon entering the door, make an immediate right to the surgery reception desk    [x] Bring photo ID and insurance card    [] Bring in a copy of Living will or Durable Power of  papers. [x] Please be sure to arrange for responsible adult to provide transportation to and from the hospital    [x] Please arrange for responsible adult to be with you for the 24 hour period post procedure due to having anesthesia    [x] If you awake am of surgery not feeling well or have temperature >100 please call [phone]    GENERAL INSTRUCTIONS:    [x] Nothing by mouth after midnight, including gum, candy, mints or water    [x] You may brush your teeth, but do not swallow any water    [x] Take medications as instructed with 1-2 oz of water    [x] Stop herbal supplements and vitamins 5 days prior to procedure    [x] Follow preop dosing of blood thinners per physician instructions    [] Take 1/2 dose of evening insulin, but no insulin after midnight    [] No oral diabetic medications after midnight    [] If diabetic and have low blood sugar or feel symptomatic, take 1-2oz apple juice only    [] Bring inhalers day of surgery    [] Bring C-PAP/ Bi-Pap day of surgery    [] Bring urine specimen day of surgery    [x] Shower or bath with soap, lather and rinse well, AM of Surgery, no lotion, powders or creams to surgical site    [] Follow bowel prep as instructed per surgeon    [x] No tobacco products within 24 hours of surgery     [x] No alcohol or illegal drug use within 24 hours of surgery.     [x] Jewelry, body piercing's, eyeglasses, contact lenses and dentures are not permitted into surgery (bring cases)      [] Please do not wear any nail polish, make up or hair products on the day of surgery    [x] You can expect a call the business day prior to procedure to notify you if your arrival time changes    [x] If you receive a survey after surgery we would greatly appreciate your comments    [] Parent/guardian of a minor must accompany their child and remain on the premises  the entire time they are under our care     [] Pediatric patients may bring favorite toy, blanket or comfort item with them    [] A caregiver or family member must remain with the patient during their stay if they are mentally handicapped, have dementia, disoriented or unable to use a call light or would be a safety concern if left unattended    [x] Please notify surgeon if you develop any illness between now and time of surgery (cold, cough, sore throat, fever, nausea, vomiting) or any signs of infections  including skin, wounds, and dental.    [x]  The Outpatient Pharmacy is available to fill your prescription here on your day of surgery, ask your preop nurse for details    [] Other instructions    EDUCATIONAL MATERIALS PROVIDED:    [x] PAT Preoperative Education Packet/Booklet     [x] Medication List    [] Transfusion bracelet applied with instructions    [x] Shower with soap, lather and rinse well, and use CHG wipes provided the evening before surgery as instructed    [x] Incentive spirometer with instructions

## 2022-12-21 ENCOUNTER — HOSPITAL ENCOUNTER (OUTPATIENT)
Dept: CT IMAGING | Age: 67
Discharge: HOME | End: 2022-12-23
Payer: COMMERCIAL

## 2022-12-21 DIAGNOSIS — Z96.9: ICD-10-CM

## 2022-12-21 DIAGNOSIS — M16.11 ARTHRITIS OF RIGHT HIP: ICD-10-CM

## 2022-12-21 DIAGNOSIS — T81.9XXA: ICD-10-CM

## 2022-12-21 LAB — MRSA CULTURE ONLY: NORMAL

## 2022-12-21 PROCEDURE — 73700 CT LOWER EXTREMITY W/O DYE: CPT

## 2022-12-27 ENCOUNTER — ANESTHESIA EVENT (OUTPATIENT)
Dept: OPERATING ROOM | Age: 67
End: 2022-12-27
Payer: COMMERCIAL

## 2022-12-27 NOTE — H&P
[facility] [place], a 79year old female, is seen today for a right hip pain for a duration of  2 weeks. Patient rates her pain as 8/10 and is constant. Feels like someone is punching her. Unable to sleep. Patient states that rest makes the pain better and standing and walking makes the pain worse. No paon meds. She arrived today using a cane. Ericka Garcia was not seen by another provider for this condition. Injury sustained on 11/14/2022 at home,   fell. Ericka Garcia was referred by Florencio Gamino DO. Hx of fx femur with sx in July 2021        History of present illness       Ericka Garcia is a 79Years Old Female who presents to the office for evaluation of right hip pain beginning 1 week ago after a fall at home landing on her right side. She has history of a right hip ORIF last July with Dr. Zoë Garcia. She was having no pain in the hip until her fall. Her family doctor ordered xray and CT of the right hip that will be reviewed today in office. Past medical history is significant for stroke 2 years ago resulting in left foot drop. She takes aspirin. Pain is localized to the groin, thigh, buttock. Symptoms include instability.  Pain is worse with activity, stairs, walking and improves with rest.  Previous and current medications include aspirin  Previous and current treatments include cane      Physical Exam   General Appearance   Awake, alert, oriented x3  No acute distress  Well developed, well nourished  Eyes appear Normal    Cardiovascular   No obvious abnormalities  No lower extremity edema present    Respiratory   Respiratory effort: non-labored      Lymphatic/Skin       Skin Appearance: Normal      Right Hip Exam     Skin Exam:    skin intact    Pain with log roll:   yes  Flexion contracture:   no  Painful ROM:   yes  TTP Greater Trochanter:   no  FADIR:   yes  GORAN:   yes  Stinchfield:   yes    Neurovascular SILT L1-S1, TA/EHL/GS intact, +2 DP warm well perfused, calf soft and nontender    Left Hip Exam     Skin Exam:   skin intact    Pain with log roll:   no  Flexion contracture:   no  Painful ROM:   no  TTP Greater Trochanter:   no  FADIR:   no  GORAN:   no  Stinchfield:   no  Normal range of motion    Neurovascular SILT L1-S1, +2 DP warm well perfused, calf soft and nontender    Gait:   antalgic, assistive device  Comments:   left foot drop      X-Ray Findings November 22, 2022 Xray AP pelvis and 2 views right hip were obtained at [facility] and reviewed in the office today. They show an acute fracture of the femoral head and 3 prior cannulated screws. Additional Imaging: CT performed at [address] [place] [address] imaging was reviewed today in office dated 11/21/2022. This reveals an acute fracture of the femoral head vs AVN.        Past Medical History - reviewed  Arthritis  Stroke  COPD/Emphysema    Surgical History - reviewed  Carpal Tunnel (right)    Social History - reviewed  Hand dominance: right  Occupation: retired    Family History - reviewed  insignificant      Risk Factors - reviewed  Patient had a flu shot in the last 12 months? no  The patient is 72 years or older and has had a pneumonia vaccine: yes  Patient has an implant none  Tobacco status: current some day smoker  Alcohol use: drinks occasionally  Does patient exercise? yes  How often does patient exercise? daily  In the past 12 months, have you fallen? yes        Current Medications (including meds started today):   cephalexin 500 mg tablet (cephalexin) Take 1 tablet by mouth three times a day   hydrocodone-acetaminophen 5-325 mg tablet (hydrocodone-acetaminophen) Take 1 tablet by mouth every four to six hours as needed for pain   Stiolto Respimat 2.5-2.5 mcg/actuation mist (tiotropium-olodaterol)   levocetirizine 5 mg tablet (levocetirizine)   nabumetone 750 mg tablet (nabumetone)   pregabalin 200 mg capsule (pregabalin) TAKE 1 CAPSULE BY MOUTH TWICE A DAY  atorvastatin 40 mg tablet (atorvastatin) TAKE 1 TABLET BY MOUTH EVERY DAY AT NIGHT  levothyroxine 50 mcg tablet (levothyroxine) TAKE 1 TABLET BY MOUTH EVERY DAY  aspirin 325 mg tablet,delayed release (DR/EC) (aspirin) TAKE 1 TABLET BY MOUTH EVERY DAY      Current Allergies (reviewed this update):  No known allergies      Vital Signs   Weight: 120.00 lbs. (54.43 kg.)  Height: 66 in.    (167.64 cm.)  Pulse Rate: 78  Blood Pressure: 148/93 mm Hg  Body Mass Index: 19.37  Body Surface Area: 1.61 m2      Review of Systems   General: Positive for weight loss. Musculoskeletal: Positive for arthritis. Neurologic: Positive for tingling. The remainder of the complete review of systems was negative. Impression   1. Displaced articular fracture of head of right femur, initial encounter for closed fracture: New  2. Right hip osteoarthrits: New  3. Presence of retained hardware: New    Plan of Care:   Dylan Clark appears to have a new right femoral head fracture. Strongly encouraged her to limit WB on the RLE and use a walker. She was prescribed norco for pain control. I had a long discussion regarding degenerative joint disease, acute femoral head fractures, and prior surgical hardware. Operative and nonoperative treatment was discussed. Xrays show prior surgical hardware with degenerative joint disease and a new femoral head fracture, nonoperative treatment was failed and pain has become a quality of life issue. Dylan Clark would like to proceed with right hip hardware removal conversion to Right Brandt Total Hip Arthroplasty. The surgery, implants, postoperative course and risks and benefits of surgery were discussed and all questions were answered.

## 2022-12-28 ENCOUNTER — HOSPITAL ENCOUNTER (OUTPATIENT)
Age: 67
Setting detail: OBSERVATION
Discharge: HOME | End: 2022-12-29
Attending: ORTHOPAEDIC SURGERY | Admitting: ORTHOPAEDIC SURGERY
Payer: COMMERCIAL

## 2022-12-28 ENCOUNTER — ANESTHESIA (OUTPATIENT)
Dept: OPERATING ROOM | Age: 67
End: 2022-12-28
Payer: COMMERCIAL

## 2022-12-28 ENCOUNTER — APPOINTMENT (OUTPATIENT)
Dept: GENERAL RADIOLOGY | Age: 67
End: 2022-12-28
Attending: ORTHOPAEDIC SURGERY
Payer: COMMERCIAL

## 2022-12-28 DIAGNOSIS — Z96.9 PRESENCE OF RETAINED HARDWARE: ICD-10-CM

## 2022-12-28 DIAGNOSIS — S72.061A CLOSED DISPLACED ARTICULAR FRACTURE OF HEAD OF RIGHT FEMUR, INITIAL ENCOUNTER (HCC): ICD-10-CM

## 2022-12-28 DIAGNOSIS — M16.11 ARTHRITIS OF RIGHT HIP: ICD-10-CM

## 2022-12-28 DIAGNOSIS — M16.11 PRIMARY OSTEOARTHRITIS OF RIGHT HIP: Primary | ICD-10-CM

## 2022-12-28 PROCEDURE — 88305 TISSUE EXAM BY PATHOLOGIST: CPT

## 2022-12-28 PROCEDURE — 6360000002 HC RX W HCPCS: Performed by: ORTHOPAEDIC SURGERY

## 2022-12-28 PROCEDURE — 3700000000 HC ANESTHESIA ATTENDED CARE: Performed by: ORTHOPAEDIC SURGERY

## 2022-12-28 PROCEDURE — 2580000003 HC RX 258: Performed by: NURSE PRACTITIONER

## 2022-12-28 PROCEDURE — 2580000003 HC RX 258: Performed by: ORTHOPAEDIC SURGERY

## 2022-12-28 PROCEDURE — 97535 SELF CARE MNGMENT TRAINING: CPT

## 2022-12-28 PROCEDURE — 7100000000 HC PACU RECOVERY - FIRST 15 MIN: Performed by: ORTHOPAEDIC SURGERY

## 2022-12-28 PROCEDURE — G0378 HOSPITAL OBSERVATION PER HR: HCPCS

## 2022-12-28 PROCEDURE — 97165 OT EVAL LOW COMPLEX 30 MIN: CPT

## 2022-12-28 PROCEDURE — 6360000002 HC RX W HCPCS: Performed by: NURSE ANESTHETIST, CERTIFIED REGISTERED

## 2022-12-28 PROCEDURE — 7100000001 HC PACU RECOVERY - ADDTL 15 MIN: Performed by: ORTHOPAEDIC SURGERY

## 2022-12-28 PROCEDURE — 73502 X-RAY EXAM HIP UNI 2-3 VIEWS: CPT

## 2022-12-28 PROCEDURE — 3600000005 HC SURGERY LEVEL 5 BASE: Performed by: ORTHOPAEDIC SURGERY

## 2022-12-28 PROCEDURE — 88311 DECALCIFY TISSUE: CPT

## 2022-12-28 PROCEDURE — 2500000003 HC RX 250 WO HCPCS: Performed by: NURSE ANESTHETIST, CERTIFIED REGISTERED

## 2022-12-28 PROCEDURE — 6360000002 HC RX W HCPCS: Performed by: NURSE PRACTITIONER

## 2022-12-28 PROCEDURE — 97530 THERAPEUTIC ACTIVITIES: CPT

## 2022-12-28 PROCEDURE — 6370000000 HC RX 637 (ALT 250 FOR IP): Performed by: ORTHOPAEDIC SURGERY

## 2022-12-28 PROCEDURE — 88300 SURGICAL PATH GROSS: CPT

## 2022-12-28 PROCEDURE — 2709999900 HC NON-CHARGEABLE SUPPLY: Performed by: ORTHOPAEDIC SURGERY

## 2022-12-28 PROCEDURE — 3700000001 HC ADD 15 MINUTES (ANESTHESIA): Performed by: ORTHOPAEDIC SURGERY

## 2022-12-28 PROCEDURE — 2720000010 HC SURG SUPPLY STERILE: Performed by: ORTHOPAEDIC SURGERY

## 2022-12-28 PROCEDURE — 3600000015 HC SURGERY LEVEL 5 ADDTL 15MIN: Performed by: ORTHOPAEDIC SURGERY

## 2022-12-28 PROCEDURE — 6370000000 HC RX 637 (ALT 250 FOR IP): Performed by: NURSE PRACTITIONER

## 2022-12-28 PROCEDURE — C1713 ANCHOR/SCREW BN/BN,TIS/BN: HCPCS | Performed by: ORTHOPAEDIC SURGERY

## 2022-12-28 PROCEDURE — 97161 PT EVAL LOW COMPLEX 20 MIN: CPT

## 2022-12-28 PROCEDURE — C1776 JOINT DEVICE (IMPLANTABLE): HCPCS | Performed by: ORTHOPAEDIC SURGERY

## 2022-12-28 PROCEDURE — 2500000003 HC RX 250 WO HCPCS: Performed by: ORTHOPAEDIC SURGERY

## 2022-12-28 PROCEDURE — A4217 STERILE WATER/SALINE, 500 ML: HCPCS | Performed by: ORTHOPAEDIC SURGERY

## 2022-12-28 PROCEDURE — 2580000003 HC RX 258: Performed by: NURSE ANESTHETIST, CERTIFIED REGISTERED

## 2022-12-28 DEVICE — INSERT ACET D 0 DEG 36 MM HIP X3 TRIDENT: Type: IMPLANTABLE DEVICE | Site: HIP | Status: FUNCTIONAL

## 2022-12-28 DEVICE — HEAD FEM DIA36MM -5MM OFFSET HIP BIOLOX DELT CERAMIC TAPR: Type: IMPLANTABLE DEVICE | Site: HIP | Status: FUNCTIONAL

## 2022-12-28 DEVICE — SHELL ACET SZ D DIA50MM 3 CLUS H TRITANIUM PRESSFIT PRI: Type: IMPLANTABLE DEVICE | Site: HIP | Status: FUNCTIONAL

## 2022-12-28 DEVICE — IMPLANTABLE DEVICE: Type: IMPLANTABLE DEVICE | Site: HIP | Status: FUNCTIONAL

## 2022-12-28 RX ORDER — MIDAZOLAM HYDROCHLORIDE 2 MG/2ML
2 INJECTION, SOLUTION INTRAMUSCULAR; INTRAVENOUS
Status: DISCONTINUED | OUTPATIENT
Start: 2022-12-28 | End: 2022-12-28 | Stop reason: HOSPADM

## 2022-12-28 RX ORDER — OXYCODONE HYDROCHLORIDE 5 MG/1
5 TABLET ORAL EVERY 4 HOURS PRN
Status: DISCONTINUED | OUTPATIENT
Start: 2022-12-28 | End: 2022-12-29 | Stop reason: HOSPADM

## 2022-12-28 RX ORDER — SODIUM CHLORIDE 0.9 % (FLUSH) 0.9 %
5-40 SYRINGE (ML) INJECTION EVERY 12 HOURS SCHEDULED
Status: DISCONTINUED | OUTPATIENT
Start: 2022-12-28 | End: 2022-12-29 | Stop reason: HOSPADM

## 2022-12-28 RX ORDER — HALOPERIDOL 5 MG/ML
1 INJECTION INTRAMUSCULAR
Status: DISCONTINUED | OUTPATIENT
Start: 2022-12-28 | End: 2022-12-28 | Stop reason: HOSPADM

## 2022-12-28 RX ORDER — SODIUM CHLORIDE 9 MG/ML
INJECTION, SOLUTION INTRAVENOUS CONTINUOUS PRN
Status: DISCONTINUED | OUTPATIENT
Start: 2022-12-28 | End: 2022-12-28 | Stop reason: SDUPTHER

## 2022-12-28 RX ORDER — IPRATROPIUM BROMIDE AND ALBUTEROL SULFATE 2.5; .5 MG/3ML; MG/3ML
1 SOLUTION RESPIRATORY (INHALATION)
Status: DISCONTINUED | OUTPATIENT
Start: 2022-12-28 | End: 2022-12-28 | Stop reason: HOSPADM

## 2022-12-28 RX ORDER — SENNA AND DOCUSATE SODIUM 50; 8.6 MG/1; MG/1
1 TABLET, FILM COATED ORAL 2 TIMES DAILY
Status: DISCONTINUED | OUTPATIENT
Start: 2022-12-28 | End: 2022-12-29 | Stop reason: HOSPADM

## 2022-12-28 RX ORDER — OXYCODONE HYDROCHLORIDE 5 MG/1
5 TABLET ORAL
Status: DISCONTINUED | OUTPATIENT
Start: 2022-12-28 | End: 2022-12-28 | Stop reason: HOSPADM

## 2022-12-28 RX ORDER — SODIUM CHLORIDE 0.9 % (FLUSH) 0.9 %
5-40 SYRINGE (ML) INJECTION PRN
Status: DISCONTINUED | OUTPATIENT
Start: 2022-12-28 | End: 2022-12-29 | Stop reason: HOSPADM

## 2022-12-28 RX ORDER — SODIUM CHLORIDE 9 MG/ML
INJECTION, SOLUTION INTRAVENOUS PRN
Status: DISCONTINUED | OUTPATIENT
Start: 2022-12-28 | End: 2022-12-28 | Stop reason: HOSPADM

## 2022-12-28 RX ORDER — DIPHENHYDRAMINE HYDROCHLORIDE 50 MG/ML
12.5 INJECTION INTRAMUSCULAR; INTRAVENOUS
Status: DISCONTINUED | OUTPATIENT
Start: 2022-12-28 | End: 2022-12-28 | Stop reason: HOSPADM

## 2022-12-28 RX ORDER — LABETALOL HYDROCHLORIDE 5 MG/ML
10 INJECTION, SOLUTION INTRAVENOUS
Status: DISCONTINUED | OUTPATIENT
Start: 2022-12-28 | End: 2022-12-28 | Stop reason: HOSPADM

## 2022-12-28 RX ORDER — PROPOFOL 10 MG/ML
INJECTION, EMULSION INTRAVENOUS PRN
Status: DISCONTINUED | OUTPATIENT
Start: 2022-12-28 | End: 2022-12-28 | Stop reason: SDUPTHER

## 2022-12-28 RX ORDER — PREGABALIN 75 MG/1
75 CAPSULE ORAL ONCE
Status: COMPLETED | OUTPATIENT
Start: 2022-12-28 | End: 2022-12-28

## 2022-12-28 RX ORDER — SODIUM CHLORIDE 9 MG/ML
INJECTION, SOLUTION INTRAVENOUS CONTINUOUS
Status: ACTIVE | OUTPATIENT
Start: 2022-12-28 | End: 2022-12-29

## 2022-12-28 RX ORDER — SODIUM CHLORIDE 9 MG/ML
INJECTION, SOLUTION INTRAVENOUS PRN
Status: DISCONTINUED | OUTPATIENT
Start: 2022-12-28 | End: 2022-12-29 | Stop reason: HOSPADM

## 2022-12-28 RX ORDER — ONDANSETRON 4 MG/1
4 TABLET, ORALLY DISINTEGRATING ORAL EVERY 8 HOURS PRN
Status: DISCONTINUED | OUTPATIENT
Start: 2022-12-28 | End: 2022-12-29 | Stop reason: HOSPADM

## 2022-12-28 RX ORDER — ONDANSETRON 2 MG/ML
4 INJECTION INTRAMUSCULAR; INTRAVENOUS EVERY 6 HOURS PRN
Status: DISCONTINUED | OUTPATIENT
Start: 2022-12-28 | End: 2022-12-29 | Stop reason: HOSPADM

## 2022-12-28 RX ORDER — ACETAMINOPHEN 500 MG
1000 TABLET ORAL ONCE
Status: COMPLETED | OUTPATIENT
Start: 2022-12-28 | End: 2022-12-28

## 2022-12-28 RX ORDER — SODIUM CHLORIDE 0.9 % (FLUSH) 0.9 %
5-40 SYRINGE (ML) INJECTION PRN
Status: DISCONTINUED | OUTPATIENT
Start: 2022-12-28 | End: 2022-12-28 | Stop reason: HOSPADM

## 2022-12-28 RX ORDER — SODIUM CHLORIDE 0.9 % (FLUSH) 0.9 %
5-40 SYRINGE (ML) INJECTION EVERY 12 HOURS SCHEDULED
Status: DISCONTINUED | OUTPATIENT
Start: 2022-12-28 | End: 2022-12-28 | Stop reason: HOSPADM

## 2022-12-28 RX ORDER — VANCOMYCIN HYDROCHLORIDE 1 G/20ML
INJECTION, POWDER, LYOPHILIZED, FOR SOLUTION INTRAVENOUS PRN
Status: DISCONTINUED | OUTPATIENT
Start: 2022-12-28 | End: 2022-12-28 | Stop reason: ALTCHOICE

## 2022-12-28 RX ORDER — FENTANYL CITRATE 50 UG/ML
INJECTION, SOLUTION INTRAMUSCULAR; INTRAVENOUS PRN
Status: DISCONTINUED | OUTPATIENT
Start: 2022-12-28 | End: 2022-12-28 | Stop reason: SDUPTHER

## 2022-12-28 RX ORDER — HYDRALAZINE HYDROCHLORIDE 20 MG/ML
10 INJECTION INTRAMUSCULAR; INTRAVENOUS
Status: DISCONTINUED | OUTPATIENT
Start: 2022-12-28 | End: 2022-12-28 | Stop reason: HOSPADM

## 2022-12-28 RX ORDER — DEXAMETHASONE SODIUM PHOSPHATE 4 MG/ML
INJECTION, SOLUTION INTRA-ARTICULAR; INTRALESIONAL; INTRAMUSCULAR; INTRAVENOUS; SOFT TISSUE PRN
Status: DISCONTINUED | OUTPATIENT
Start: 2022-12-28 | End: 2022-12-28 | Stop reason: SDUPTHER

## 2022-12-28 RX ORDER — ONDANSETRON 2 MG/ML
INJECTION INTRAMUSCULAR; INTRAVENOUS PRN
Status: DISCONTINUED | OUTPATIENT
Start: 2022-12-28 | End: 2022-12-28 | Stop reason: SDUPTHER

## 2022-12-28 RX ORDER — ACETAMINOPHEN 325 MG/1
650 TABLET ORAL EVERY 6 HOURS
Status: DISCONTINUED | OUTPATIENT
Start: 2022-12-28 | End: 2022-12-29 | Stop reason: HOSPADM

## 2022-12-28 RX ORDER — DEXAMETHASONE SODIUM PHOSPHATE 10 MG/ML
8 INJECTION, SOLUTION INTRAMUSCULAR; INTRAVENOUS ONCE
Status: COMPLETED | OUTPATIENT
Start: 2022-12-28 | End: 2022-12-28

## 2022-12-28 RX ORDER — SENNA AND DOCUSATE SODIUM 50; 8.6 MG/1; MG/1
1 TABLET, FILM COATED ORAL 2 TIMES DAILY
Qty: 30 TABLET | Refills: 0 | Status: SHIPPED | OUTPATIENT
Start: 2022-12-28

## 2022-12-28 RX ORDER — SODIUM CHLORIDE 9 MG/ML
INJECTION, SOLUTION INTRAVENOUS CONTINUOUS
Status: DISCONTINUED | OUTPATIENT
Start: 2022-12-28 | End: 2022-12-29 | Stop reason: HOSPADM

## 2022-12-28 RX ORDER — OXYCODONE HYDROCHLORIDE 5 MG/1
10 TABLET ORAL EVERY 4 HOURS PRN
Status: DISCONTINUED | OUTPATIENT
Start: 2022-12-28 | End: 2022-12-29 | Stop reason: HOSPADM

## 2022-12-28 RX ORDER — ROCURONIUM BROMIDE 10 MG/ML
INJECTION, SOLUTION INTRAVENOUS PRN
Status: DISCONTINUED | OUTPATIENT
Start: 2022-12-28 | End: 2022-12-28 | Stop reason: SDUPTHER

## 2022-12-28 RX ORDER — LIDOCAINE HYDROCHLORIDE 20 MG/ML
INJECTION, SOLUTION EPIDURAL; INFILTRATION; INTRACAUDAL; PERINEURAL PRN
Status: DISCONTINUED | OUTPATIENT
Start: 2022-12-28 | End: 2022-12-28 | Stop reason: SDUPTHER

## 2022-12-28 RX ORDER — DEXAMETHASONE SODIUM PHOSPHATE 10 MG/ML
10 INJECTION INTRAMUSCULAR; INTRAVENOUS ONCE
Status: COMPLETED | OUTPATIENT
Start: 2022-12-29 | End: 2022-12-29

## 2022-12-28 RX ORDER — KETOROLAC TROMETHAMINE 30 MG/ML
15 INJECTION, SOLUTION INTRAMUSCULAR; INTRAVENOUS EVERY 6 HOURS PRN
Status: DISCONTINUED | OUTPATIENT
Start: 2022-12-28 | End: 2022-12-29 | Stop reason: HOSPADM

## 2022-12-28 RX ORDER — ACETAMINOPHEN 325 MG/1
650 TABLET ORAL EVERY 6 HOURS
Qty: 120 TABLET | Refills: 3 | Status: SHIPPED | OUTPATIENT
Start: 2022-12-28

## 2022-12-28 RX ORDER — PROCHLORPERAZINE EDISYLATE 5 MG/ML
5 INJECTION INTRAMUSCULAR; INTRAVENOUS
Status: DISCONTINUED | OUTPATIENT
Start: 2022-12-28 | End: 2022-12-28 | Stop reason: HOSPADM

## 2022-12-28 RX ORDER — OXYCODONE HYDROCHLORIDE 5 MG/1
5 TABLET ORAL EVERY 4 HOURS PRN
Qty: 42 TABLET | Refills: 0 | Status: SHIPPED | OUTPATIENT
Start: 2022-12-28 | End: 2023-01-04

## 2022-12-28 RX ORDER — KETOROLAC TROMETHAMINE 30 MG/ML
15 INJECTION, SOLUTION INTRAMUSCULAR; INTRAVENOUS ONCE
Status: DISCONTINUED | OUTPATIENT
Start: 2022-12-28 | End: 2022-12-28 | Stop reason: HOSPADM

## 2022-12-28 RX ADMIN — PHENYLEPHRINE HYDROCHLORIDE 100 MCG: 10 INJECTION INTRAVENOUS at 14:16

## 2022-12-28 RX ADMIN — PHENYLEPHRINE HYDROCHLORIDE 100 MCG: 10 INJECTION INTRAVENOUS at 14:24

## 2022-12-28 RX ADMIN — SODIUM CHLORIDE: 9 INJECTION, SOLUTION INTRAVENOUS at 20:16

## 2022-12-28 RX ADMIN — PHENYLEPHRINE HYDROCHLORIDE 100 MCG: 10 INJECTION INTRAVENOUS at 13:06

## 2022-12-28 RX ADMIN — SODIUM CHLORIDE: 9 INJECTION, SOLUTION INTRAVENOUS at 14:15

## 2022-12-28 RX ADMIN — SODIUM CHLORIDE: 9 INJECTION, SOLUTION INTRAVENOUS at 12:48

## 2022-12-28 RX ADMIN — SUGAMMADEX 300 MG: 100 INJECTION, SOLUTION INTRAVENOUS at 14:46

## 2022-12-28 RX ADMIN — ROCURONIUM BROMIDE 40 MG: 10 INJECTION, SOLUTION INTRAVENOUS at 12:52

## 2022-12-28 RX ADMIN — WATER 2000 MG: 1 INJECTION INTRAMUSCULAR; INTRAVENOUS; SUBCUTANEOUS at 12:48

## 2022-12-28 RX ADMIN — ONDANSETRON 4 MG: 2 INJECTION INTRAMUSCULAR; INTRAVENOUS at 14:16

## 2022-12-28 RX ADMIN — DOCUSATE SODIUM 50 MG AND SENNOSIDES 8.6 MG 1 TABLET: 8.6; 5 TABLET, FILM COATED ORAL at 20:53

## 2022-12-28 RX ADMIN — WATER 1000 MG: 1 INJECTION INTRAMUSCULAR; INTRAVENOUS; SUBCUTANEOUS at 20:17

## 2022-12-28 RX ADMIN — ASPIRIN 325 MG: 325 TABLET, COATED ORAL at 20:54

## 2022-12-28 RX ADMIN — ACETAMINOPHEN 650 MG: 325 TABLET ORAL at 18:09

## 2022-12-28 RX ADMIN — FENTANYL CITRATE 50 MCG: 50 INJECTION, SOLUTION INTRAMUSCULAR; INTRAVENOUS at 14:54

## 2022-12-28 RX ADMIN — ACETAMINOPHEN 1000 MG: 500 TABLET ORAL at 10:50

## 2022-12-28 RX ADMIN — ROCURONIUM BROMIDE 10 MG: 10 INJECTION, SOLUTION INTRAVENOUS at 13:50

## 2022-12-28 RX ADMIN — DEXAMETHASONE SODIUM PHOSPHATE 8 MG: 4 INJECTION, SOLUTION INTRAMUSCULAR; INTRAVENOUS at 13:00

## 2022-12-28 RX ADMIN — FENTANYL CITRATE 50 MCG: 50 INJECTION, SOLUTION INTRAMUSCULAR; INTRAVENOUS at 12:52

## 2022-12-28 RX ADMIN — TRANEXAMIC ACID 1000 MG: 1 INJECTION, SOLUTION INTRAVENOUS at 15:22

## 2022-12-28 RX ADMIN — FENTANYL CITRATE 50 MCG: 50 INJECTION, SOLUTION INTRAMUSCULAR; INTRAVENOUS at 13:40

## 2022-12-28 RX ADMIN — PREGABALIN 75 MG: 75 CAPSULE ORAL at 10:51

## 2022-12-28 RX ADMIN — SODIUM CHLORIDE, PRESERVATIVE FREE 10 ML: 5 INJECTION INTRAVENOUS at 20:17

## 2022-12-28 RX ADMIN — DEXAMETHASONE SODIUM PHOSPHATE 8 MG: 10 INJECTION, SOLUTION INTRAMUSCULAR; INTRAVENOUS at 10:51

## 2022-12-28 RX ADMIN — ACETAMINOPHEN 650 MG: 325 TABLET ORAL at 22:47

## 2022-12-28 RX ADMIN — PROPOFOL 160 MG: 10 INJECTION, EMULSION INTRAVENOUS at 12:52

## 2022-12-28 RX ADMIN — FENTANYL CITRATE 50 MCG: 50 INJECTION, SOLUTION INTRAMUSCULAR; INTRAVENOUS at 14:46

## 2022-12-28 RX ADMIN — LIDOCAINE HYDROCHLORIDE 60 MG: 20 INJECTION, SOLUTION EPIDURAL; INFILTRATION; INTRACAUDAL; PERINEURAL at 12:52

## 2022-12-28 ASSESSMENT — PAIN SCALES - GENERAL
PAINLEVEL_OUTOF10: 0
PAINLEVEL_OUTOF10: 8
PAINLEVEL_OUTOF10: 3
PAINLEVEL_OUTOF10: 0

## 2022-12-28 ASSESSMENT — PAIN DESCRIPTION - LOCATION
LOCATION: HIP
LOCATION: HIP

## 2022-12-28 ASSESSMENT — PAIN DESCRIPTION - ORIENTATION
ORIENTATION: RIGHT
ORIENTATION: RIGHT

## 2022-12-28 ASSESSMENT — PAIN DESCRIPTION - DESCRIPTORS
DESCRIPTORS: ACHING
DESCRIPTORS: ACHING;DISCOMFORT
DESCRIPTORS: DISCOMFORT

## 2022-12-28 ASSESSMENT — PAIN - FUNCTIONAL ASSESSMENT
PAIN_FUNCTIONAL_ASSESSMENT: 0-10
PAIN_FUNCTIONAL_ASSESSMENT: PREVENTS OR INTERFERES SOME ACTIVE ACTIVITIES AND ADLS

## 2022-12-28 ASSESSMENT — COPD QUESTIONNAIRES: CAT_SEVERITY: MODERATE

## 2022-12-28 NOTE — PROGRESS NOTES
Physical Therapy  Facility/Department: [facility] MED SURG  Physical Therapy Initial Assessment    Name: Gavin Hui  : 1955  MRN: [id number]  Date of Service: 2022               Patient Diagnosis(es): Diagnoses of Closed displaced articular fracture of head of right femur, initial encounter ([facility] Presence of retained hardware, and Arthritis of right hip were pertinent to this visit. Past Medical History:  has a past medical history of Anxiety, Arthritis, Cerebrovascular accident (CVA) due to occlusion of small artery ([facility] COPD (chronic obstructive pulmonary disease) ([facility] Hip pain, right, Numbness and tingling of both legs, and Thyroid disease. Past Surgical History:  has a past surgical history that includes Tonsillectomy; Appendectomy;  section; Hip fracture surgery (Right, 2021); and Carpal tunnel release (Bilateral). Requires PT Follow-Up: Yes     Evaluating Therapist: Diana Garnett PT     Referring Provider:  Jennifer Moss MD    PT order : PT eval and treat     Room #: 733   DIAGNOSIS: Diagnoses of Closed displaced articular fracture of head of right femur, initial encounter ([facility] Presence of retained hardware, and Arthritis of right hip were pertinent to this visit. S/p R JENNIFER revision   Additional Pertinent History: CVA   PRECAUTIONS:  falls, L AFO, posterolateral hip, FWBAT     Social:  Pt lives alone in a 1  floor plan  no  steps  to enter - has elevator . Plans to stay with her daughter in a 1 floor plan, steps with HR to enter   Prior to admission pt walked with rollator      Initial Evaluation  Date:  2022  Treatment      Short Term/ Long Term   Goals   Was pt agreeable to Eval/treatment? Yes      Does pt have pain?   R hip      Bed Mobility  Rolling:  NT   Supine to sit: SBA   Sit to supine:  NT   Scooting:  SBA    Independent    Transfers Sit to stand:  CGA   Stand to sit:  CGA   Stand pivot:  NT    Independent    Ambulation     80  feet with ww with  CGA    150-200 feet with  ww  with  S/I        Stair negotiation: ascended and descended NT    4  steps with  1  rail with SBA   LE ROM  Has L foot drop/inversion   R hip WFL adhering to precautions      LE strength  R hip 3- to 3/ 5 distally 4/ 5      AM- PAC RAW score  18/ 24            Pt is alert and Oriented x 4      Balance:  CGA . Fall risk due to  h/o falls, weakness, decreased balance   Endurance: WFL        ASSESSMENT  Pt displays functional ability as noted in the objective portion of this evaluation. Conditions Requiring Skilled Therapeutic Intervention:    [x]Decreased strength     [x]Decreased ROM  [x]Decreased functional mobility  [x]Decreased balance   [x]Decreased endurance   []Decreased posture  []Decreased sensation  []Decreased coordination   []Decreased vision  []Decreased safety awareness   [x]Increased pain         Treatment/Education:    Pt in bed  upon arrival ; agreeable to PT. Instructed in APs, QS, GS, and hip precautions prior to mobility. Mobility as above. L AFO and shoes donned prior to mobility. Cues needed for technique with bed mobility to maintain precautions. Instructed in hand and foot placement with transfers and proper ww technique as pt in used to using a rollator. Sloq gait owen. Pt educated on fall risk,  safety with mobility        Patient response to education:   Pt verbalized understanding Pt demonstrated skill Pt requires further education in this area   x With cues   X        Comments:  Pt left  in hip chair after session, with call light in reach. Rehab potential is Good for reaching above PT goals. Pts/ family goals   1. Home     Patient and or family understand(s) diagnosis, prognosis, and plan of care. -  yes     PLAN  PT care will be provided in accordance with the objectives noted above. Whenever appropriate, clear delegation orders will be provided for nursing staff.   Exercises and functional mobility practice will be used as well as appropriate assistive devices or modalities to obtain goals. Patient and family education will also be administered as needed. PLAN OF CARE:    Current Treatment Recommendations     [x] Strengthening to improve independence with functional mobility   [x] ROM to improve independence with functional mobility   [x] Balance Training to improve static/dynamic balance and to reduce fall risk  [x] Endurance Training to improve activity tolerance during functional mobility   [x] Transfer Training to improve safety and independence with all functional transfers   [x] Gait Training to improve gait mechanics, endurance and assess need for appropriate assistive device  [x] Stair Training in preparation for safe discharge home and/or into the community   [x] Positioning to prevent skin breakdown and contractures  [x] Safety and Education Training   [] Patient/Caregiver Education   [] HEP  [] Other     Frequency of treatments will be BID x2 days. Time in: 1633   Time out:  1704       Evaluation Time includes thorough review of current medical information, gathering information on past medical history/social history and prior level of function, completion of standardized testing/informal observation of tasks, assessment of data and education on plan of care and goals.     CPT codes:  [x] Low Complexity PT evaluation 22306  [] Moderate Complexity PT evaluation 58635  [] High Complexity PT evaluation 85127  [] PT Re-evaluation 84315  [] Gait training 95360  minutes  [x] Therapeutic activities 10883 10 minutes  [] Therapeutic exercises 07851  minutes  [] Neuromuscular reeducation 41365  minutes       Chandra 18 number:  PT 2324

## 2022-12-28 NOTE — ANESTHESIA POSTPROCEDURE EVALUATION
Department of Anesthesiology  Postprocedure Note    Patient: Liban Mendoza  MRN: [id number]  [place]: 1955  Date of evaluation: 12/28/2022      Procedure Summary     Date: 12/28/22 Room / Location: [facility] OR 04 / [facility]    Anesthesia Start: 6470 Anesthesia Stop: 4777    Procedure: RIGHT HIP HARDWARE REMOVAL (ELLY) CONVERSION TO RIGHT HIP QUIRINO ROBOTIC ASSISTED TOTAL ARTHROPLASTY (Right: Hip) Diagnosis:       Closed displaced articular fracture of head of right femur, initial encounter ([address]      Presence of retained hardware      Arthritis of right hip      (Closed displaced articular fracture of head of right femur, initial encounter ([address] [S72.061A])      (Presence of retained hardware [Z96.9])      (Arthritis of right hip [M16.11])    Surgeons: Shital Andrews MD Responsible Provider: Zehra Arellano MD    Anesthesia Type: general ASA Status: 3          Anesthesia Type: No value filed.     Randall Phase I: Randall Score: 8    Randall Phase II:        Anesthesia Post Evaluation    Patient location during evaluation: PACU  Patient participation: complete - patient participated  Level of consciousness: awake  Pain score: 3  Airway patency: patent  Nausea & Vomiting: no nausea and no vomiting  Complications: no  Cardiovascular status: hemodynamically stable  Respiratory status: acceptable  Hydration status: euvolemic

## 2022-12-28 NOTE — OP NOTE
Operative Report  Jessi Lennon  [id number]  12/28/2022    Pre-operative diagnosis:  1. Right hip degenerative joint disease      2. Retained hardware right hip from previous ORIF femoral neck fracture     Post-operative diagnosis:   1. Right hip degenerative joint disease      2. Retained hardware right hip from previous ORIF femoral neck fracture     Procedure:  1. Removal 3 cannulated screw right femoral neck  2. Conversion to Brandt right total hip arthroplasty    Surgeon: Jeevan Cueva MD    Assistant:  Sandra Glover NP; assisted with positioning, retracting and closing     Anesthesia:  General    Operative Indication: Ginny Martinez is a 80 y/o female with severe right hip degenerative joint disease and worsening right hip pain for the past year. She is 1 years status post ORIF femoral neck fracture with Dr. Shola Zambrano and has retained hardware. The pain has become a quality of life issue and the patient has failed nonoperative treatment. She presents for a total hip arthroplasty. The risks and benefits of surgery were discussed and all questions were answered. The risks for surgery include bleeding, infection, damage to blood vessels, damage to nerves, risk of further surgery, chronic pain,  restricted range of motion, risk of continued discomfort, risk of need for further reconstructive procedures, leg length discrepancy, risk of need for altered activities and altered gait, risk of blood clots, pulmonary embolism, myocardial infarction, and risk of death were discussed. The patient understood these risks and consented for surgery. EBL: 031 cc    Complications: None    Components: 1. Nayana restoration modular stem 115/15 mm, 19 mm +10 proximal cone body    2. Nayana Trident cup, size 50    3. [place] ceramic head, size 36, -5    Operative Procedure: Following general anesthesia, the patient was positioned lateral decubitus with the body supported by a peg board.  A [employer] robotic marker was placed on the patella superficially. The affected leg and hip were prepped and draped in the usual standard fashion. The down leg was padded and protected. The hip and groin were sealed with Peggyann Caprice. An intra-operative time out was called to confirm the planned surgical procedure and administration of IV Ancef. 3 small skin incisions were made over the iliac crest posterior to the ASIS and 3 Julio Cesar pins were placed. A [employer] satellite array was then affixed. A skin incision then was made centered over the greater trochanter. A posterior approach to the hip joint was used. The IT band and gluteus fascia were split and a Charnley retractor was placed for deep exposure. Bursal tissue was cleared over the trochanter and short external rotators. An assistant internally rotated the hip and a de retractor was placed beneath the gluteus medius muscle. The plane between gluteus minimus and the piriformis tendon was developed using electrocautery. The piriformis and short external rotator tendons were released and tagged with #5 ethibond, along with release of a portion of the quadratus femoris muscle. The capsule was then incised in a T-fashion. It was tagged with #1 ethibond and deep retractors were repositioned. A superior marker was affixed to the acetabulum and another attached to the greater trochanter. At this point the 3 screws in the lateral cortex were identified. The were removed in their entirety with a screw . The hip was then dislocated. Retractors were then repositioned and the femoral neck cut was then planned. A saw was then used to make the femoral neck cut. The femoral head was then removed. Retractors were then repositioned for exposure of the acetabulum. The labrum and pulivinar were then excised with a bovie. The acetabulum was then registered using the [facility] protocol. The [facility] robot was then employed with a plan of 40° abduction and 20° anterversion. The acetabulum was reamed to a size 50.  A size 50 acetabular cup was then impacted. The polyethylene insert was then impacted into the cup. Attention was now turned to the femoral canal. The canal was entered using a . Broaching was initiated with a size 10 reamer and was taken up to a size 15 reamer in 1mm increments. With the final reamer in place, there was good canal fill and the implant was stable with rotation. A size 15 mm modular stem was then impacted. A 19 mm cone body reamer was then used to prepare the proximal bone. A size 19 mm + 10 proximal cone body trial was then placed. A trial head was placed and the hip was reduced. The hip was stable on exam. The hip was then dislocated and trial implants removed. The hip was then thoroughly irrigated with normal saline. A 19 mm +10 cone body was then impacted. It was once again trialed with a -5 size 36 head. With these components, the hip was stable and had excellent ROM. Final components were then impacted onto the stem and the hip was again reduced. The surgical site was irrigated using pulsatile lavage. The Julio Cesar pins and all Brandt markers were removed. The capsule and external rotators were repaired with #5 ethibond through 2 drill holes in the greater trochanter. The IT band and gluteus fascia were closed using #1 Stratafix. The skin was closed in layers using 2-0, 3-0 Stratafix, dermabond and steri strips. A dry, sterile dressing was applied. The patient was then transferred to an orthopaedic bed with an abduction pillow between the legs. The patient was taken to recovery in stable condition.     Virgil Pryor MD

## 2022-12-28 NOTE — PROGRESS NOTES
Occupational Therapy  OCCUPATIONAL THERAPY INITIAL EVALUATION  [facility] [address]    Date: 2022     Patient Name: Angel Avalos  MRN: [id number]  : 1955  Room: [address] [place] [address]    Evaluating OT: Eleazarteena TELLEZJune Colunga, OTR/L - [id number]    Referring Provider: Grace Saenz MD  Specific Provider Orders/Date: \"OT eval and treat\" - 2022    Diagnosis: Closed displaced articular fracture of head of right femur, initial encounter ([facility] [address] [S72.061A]  Presence of retained hardware [Z96.9]  Arthritis of right hip [M16.11]  Osteoarthritis of right hip, unspecified osteoarthritis type [M16.11]  Primary osteoarthritis of right hip [M16.11]      Surgery: Patient underwent RIGHT HIP HARDWARE REMOVAL (ELLY) CONVERSION TO RIGHT HIP QUIRINO ROBOTIC ASSISTED TOTAL ARTHROPLASTY (Right: Hip) on 2022. Pertinent Medical History: CVA with residual L-sided weakness (), L foot drop,  COPD, anxiety, arthritis    Precautions: fall risk, posterolateral hip precautions, FWBAT, L foot drop - wears L AFO    Assessment of Current Deficits:    [x] Functional mobility   [x]ADLs  [x] Strength               [x]Cognition   [x] Functional transfers   [x] IADLs         [x] Safety Awareness   [x]Endurance   [] Fine Coordination              [x] Balance      [] Vision/perception   [x]Sensation    []Gross Motor Coordination  [] ROM  [] Delirium                   [] Motor Control     OT PLAN OF CARE   OT POC is based on physician orders, patient diagnosis, and results of clinical assessment.   Frequency/Duration 2-5 days/week for 2 weeks PRN   Specific OT Treatment Interventions to Include:   * Instruction/training on adapted ADL techniques and AE recommendations to increase functional independence within precautions       * Training on energy conservation strategies, correct breathing pattern and techniques to improve independence/tolerance for self-care routine  * Functional transfer/mobility training/DME recommendations for increased independence, safety, and fall prevention  * Patient/Family education to increase follow through with safety techniques and functional independence  * Recommendation of environmental modifications for increased safety with functional transfers/mobility and ADLs  * Therapeutic exercise to improve motor endurance, ROM, and functional strength for ADLs/functional transfers  * Therapeutic activities to facilitate/challenge dynamic balance, stand tolerance for increased safety and independence with ADLs  * Neuro-muscular re-education: facilitation of righting/equilibrium reactions, midline orientation, scapular stability/mobility, normalization of muscle tone, and facilitation of volitional active controled movement    Recommended Adaptive Equipment: TBD     Home Living: Patient lives alone in a one-floor apartment (elevator access); patient plans to stay at her daughter's home upon discharge (one-floor plan, 2 steps to enter). Bathroom Setup: tub shower (at both patient's apartment and patient's daughter's home)   Equipment Owned: tub seat, extended tub bench, rollator, hurry-cane    Prior Level of Function (PLOF): Patient was independent ADLs, most IADLs, and functional mobility (with use of cane prior to recent fall). Driving: Yes    Pain Level: Patient denied experiencing pain. Cognition: Patient alert and oriented x3. WFL command follow demonstrated. Patient pleasant, cooperative, and motivated to return to PLOF.   Memory: WFL  Sequencing: WFL  Problem Solving: WFL  Judgement/Safety: WFL    Functional Assessment:  AM-PAC Daily Activity Raw Score: 17/24   Initial Eval Status  Date: 12/28/2022 Treatment Status  Date:  Short Term Goals = Long Term Goals   Feeding Independent  N/A   Grooming CGA  Mod I (standing at sink)   UB Dressing SBA  Mod I (including item retrieval)   LB Dressing Max A to don L AFO and bilateral shoes while seated at EOB. Cues needed to ensure adherence to posterolateral hip precautions. Mod I while demonstrating Good adherence to posterolateral hip precautions and Good understanding of AE use. Bathing Mod A  Mod I while demonstrating Good adherence to posterolateral hip precautions and Good understanding of AE/DME use. Toileting Min A  Mod I   Bed Mobility  Supine-to-Sit: SBA     Independent in order to maximize patient's independence with ADLs, re-positioning, and other functional tasks. Functional Transfers Sit-to-Stand: CGA   from EOB  Independent   Functional Mobility CGA (with walker) for household+ distance within patient's room and hallway. Increased fabian needed. Mod I with functional mobility (with device, as needed/appropriate) in order to maximize independence with ADLs/IADLs and other functional tasks. Balance Sitting: Good (at EOB)  Standing: Fair- to Fair (with walker)  Fair+ dynamic standing balance during completion of ADLs/IADLs and other functional tasks. Activity Tolerance Fair  Patient will demonstrate Good understanding and consistent implementation of energy conservation techniques and work simplification techniques into ADL/IADL routines. Visual/  Perceptual WFL     N/A   B UE Strength 4-/5  Patient will demonstrate 4+/5 B UE strength in order to maximize independence with ADLs/IADLs and functional transfers. Additional Long-Term Goal: Patient will increase functional independence to PLOF in order to allow patient to live in least restrictive environment. ROM: Additional Information:    R UE  WFL    L UE WFL      Hearing: WFL  Sensation: No complaints of numbness/tingling in B UEs. Tone: WFL  Edema: No    Comments: RN approved patient's participation in [address] [place] [address] activities. Upon arrival, patient supine in bed. At end of session, patient seated in bedside chair (elevated surface) with call light and phone within reach and all lines and tubes intact.  Patient would benefit from continued skilled OT to increase safety and independence with completion of ADL/IADL tasks for functional independence and quality of life. Treatment: OT treatment provided this date included:   Instruction/training on safety and adapted techniques for completion of ADLs. Instruction/training on safe functional mobility/transfer techniques. Instruction/training on posterolateral hip precautions and implications of these on ADLs, bed mobility, and functional transfers/mobility. Patient education provided regardin) importance of AE use to ensure adherence to posterolateral hip precautions during LB ADLs. Patient indicated understanding. Further skilled OT treatment indicated to increase patient's safety and independence with completion of ADL/IADL tasks in order to maximize patient's functional independence and quality of life. Rehab Potential: Good for established goals. Patient / Family Goal: Patient plans to stay at her daughter's home upon discharge. Patient and/or family were instructed on functional diagnosis, prognosis/goals, and OT plan of care. Demonstrated Good understanding. Eval Complexity: Low    Time In: 1640  Time Out: 1705  Total Treatment Time: 10 minutes      Minutes Units   OT Eval Low 96501 15 1   OT Eval Medium 79066     OT Eval High 67053     OT Re-Eval [id number]     Therapeutic Ex 92833     Therapeutic Activities 46247     ADL/Self Care 80562 10 1   Orthotic Management 77019     Neuro Re-Ed 29093     Non-Billable Time N/A ---     Evaluation time includes thorough review of current medical information, gathering information on past medical history/social history and prior level of function, completion of standardized testing/informal observation of tasks, assessment of data, and education on plan of care and goals. ALYSSA Sainz/L  License Number: [id number]

## 2022-12-28 NOTE — INTERVAL H&P NOTE
Update History & Physical    H&P reviewed. No changes.     Electronically signed by Fan Velasco MD on 12/28/2022 at 10:51 AM

## 2022-12-28 NOTE — ANESTHESIA PRE PROCEDURE
Department of Anesthesiology  Preprocedure Note       Name:  Kaden Chamberlain   Age:  79 y.o.  :  1955                                          MRN:  [id number]         Date:  2022      Surgeon: Hanna Toney):  Aidee Lewis MD    Procedure: Procedure(s):  RIGHT HIP HARDWARE REMOVAL (ELLY) CONVERSION TO RIGHT HIP QUIRINO ROBOTIC ASSISTED TOTAL ARTHROPLASTY   ++ELLY++    Medications prior to admission:   Prior to Admission medications    Medication Sig Start Date End Date Taking? Authorizing Provider   aspirin 81 MG EC tablet Take 81 mg by mouth daily    Historical Provider, MD   pregabalin (LYRICA) 300 MG capsule Take 300 mg by mouth 2 times daily. Historical Provider, MD   tiotropium-olodaterol (STIOLTO) 2.5-2.5 MCG/ACT AERS Inhale 2 puffs into the lungs daily    Historical Provider, MD   nabumetone (RELAFEN) 750 MG tablet Take 750 mg by mouth 2 times daily    Historical Provider, MD   nicotine (NICODERM CQ) 14 MG/24HR Place 1 patch onto the skin daily  Patient not taking: No sig reported 21   Arlen Esters, DO   vitamin D (ERGOCALCIFEROL) 1.25 MG (86021 UT) CAPS capsule Take 1 capsule by mouth once a week 21   [place] GEOFF Kabak, DO   levothyroxine (SYNTHROID) 50 MCG tablet Take 50 mcg by mouth Daily    Historical Provider, MD   levocetirizine (XYZAL) 5 MG tablet Take 5 mg by mouth nightly    Historical Provider, MD       Current medications:    No current facility-administered medications for this visit. No current outpatient medications on file.      Facility-Administered Medications Ordered in Other Visits   Medication Dose Route Frequency Provider Last Rate Last Admin    0.9 % sodium chloride infusion   IntraVENous Continuous Kayren Searing, APRN - NP        sodium chloride flush 0.9 % injection 5-40 mL  5-40 mL IntraVENous 2 times per day Kayren Searing, APRN - NP        sodium chloride flush 0.9 % injection 5-40 mL  5-40 mL IntraVENous PRN Kayren Searing, APRN - NP       Jennyfer Ray Smoking status: Former     Packs/day: 0.50     Years: 53.00     Pack years: 26.50     Types: Cigarettes     Quit date: 2022     Years since quittin.0    Smokeless tobacco: Not on file   Substance Use Topics    Alcohol use: Not Currently     Comment: rarely                                Counseling given: Not Answered      Vital Signs (Current): There were no vitals filed for this visit. BP Readings from Last 3 Encounters:   22 (!) 164/91   22 134/78   21 130/75       NPO Status:                                                                                 BMI:   Wt Readings from Last 3 Encounters:   22 104 lb (47.2 kg)   22 104 lb 8 oz (47.4 kg)   21 115 lb (52.2 kg)     There is no height or weight on file to calculate BMI.    CBC:   Lab Results   Component Value Date/Time    WBC 5.7 2022 11:15 AM    RBC 4.79 2022 11:15 AM    HGB 14.2 2022 11:15 AM    HCT 43.9 2022 11:15 AM    MCV 91.6 2022 11:15 AM    RDW 13.8 2022 11:15 AM     2022 11:15 AM       CMP:   Lab Results   Component Value Date/Time     2022 11:15 AM    K 4.0 2022 11:15 AM    K 3.8 2021 04:25 AM     2022 11:15 AM    CO2 25 2022 11:15 AM    BUN 14 2022 11:15 AM    CREATININE 0.8 2022 11:15 AM    GFRAA >60 2021 04:25 AM    LABGLOM >60 2022 11:15 AM    GLUCOSE 90 2022 11:15 AM    GLUCOSE 93 2012 05:00 PM    PROT 7.0 2020 01:13 PM    CALCIUM 9.5 2022 11:15 AM    BILITOT 0.5 2020 01:13 PM    ALKPHOS 66 2020 01:13 PM    AST 24 2020 01:13 PM    ALT 12 2020 01:13 PM       POC Tests: No results for input(s): POCGLU, POCNA, POCK, POCCL, POCBUN, POCHEMO, POCHCT in the last 72 hours.     Coags:   Lab Results   Component Value Date/Time    PROTIME 11.4 2021 02:48 AM    PROTIME 11.2 10/24/2011 11:45 PM    INR 1.0 2021 02:48 AM    APTT 34.2 2021 02:48 AM       HCG (If Applicable): No results found for: PREGTESTUR, PREGSERUM, HCG, HCGQUANT     ABGs: No results found for: PHART, PO2ART, FUN7TAR, SYD9MWB, BEART, E7QGDZYE     Type & Screen (If Applicable):  No results found for: LABABO, LABRH    Drug/Infectious Status (If Applicable):  No results found for: HIV, HEPCAB    COVID-19 Screening (If Applicable):   Lab Results   Component Value Date/Time    COVID19 Not Detected 2021 11:57 AM           Anesthesia Evaluation  Patient summary reviewed no history of anesthetic complications:   Airway: Mallampati: III  TM distance: >3 FB   Neck ROM: full  Mouth opening: > = 3 FB   Dental:    (+) edentulous      Pulmonary: breath sounds clear to auscultation  (+) COPD: moderate,  decreased breath sounds: bilateral                           ROS comment: Former -1.0 - 2.0 PPD x 53 years   Quit Smokin22 due to recent hospitalization. Cardiovascular:Negative CV ROS    (+) angina:, hyperlipidemia      ECG reviewed  Rhythm: regular  Rate: normal                 ROS comment: Cleared medically. Neuro/Psych:   (+) CVA (Residual Left sided weakness. ):, neuromuscular disease (Peripheral neuropathy.):,             GI/Hepatic/Renal: Neg GI/Hepatic/Renal ROS            Endo/Other:    (+) hypothyroidism::., .                 Abdominal:             Vascular: negative vascular ROS. Other Findings:             Anesthesia Plan      general     ASA 3     (Pt agrees to GA--IV induction and ETTube. She declines spinal anesthesia.)  Induction: intravenous. MIPS: Postoperative opioids intended and Prophylactic antiemetics administered. Anesthetic plan and risks discussed with patient. Plan discussed with CRNA.                     Beto Aguirre MD   2022

## 2022-12-29 VITALS
DIASTOLIC BLOOD PRESSURE: 75 MMHG | TEMPERATURE: 98.5 F | RESPIRATION RATE: 16 BRPM | OXYGEN SATURATION: 96 % | SYSTOLIC BLOOD PRESSURE: 132 MMHG | HEART RATE: 93 BPM | HEIGHT: 66 IN | BODY MASS INDEX: 16.71 KG/M2 | WEIGHT: 104 LBS

## 2022-12-29 LAB
ANION GAP SERPL CALCULATED.3IONS-SCNC: 7 MMOL/L (ref 7–16)
BUN BLDV-MCNC: 11 MG/DL (ref 6–23)
CALCIUM SERPL-MCNC: 8.8 MG/DL (ref 8.6–10.2)
CHLORIDE BLD-SCNC: 106 MMOL/L (ref 98–107)
CO2: 25 MMOL/L (ref 22–29)
CREAT SERPL-MCNC: 0.6 MG/DL (ref 0.5–1)
GFR SERPL CREATININE-BSD FRML MDRD: >60 ML/MIN/1.73
GLUCOSE BLD-MCNC: 100 MG/DL (ref 74–99)
HCT VFR BLD CALC: 34.1 % (ref 34–48)
HEMOGLOBIN: 11.4 G/DL (ref 11.5–15.5)
MCH RBC QN AUTO: 30.4 PG (ref 26–35)
MCHC RBC AUTO-ENTMCNC: 33.4 % (ref 32–34.5)
MCV RBC AUTO: 90.9 FL (ref 80–99.9)
PDW BLD-RTO: 13.6 FL (ref 11.5–15)
PLATELET # BLD: 209 E9/L (ref 130–450)
PMV BLD AUTO: 11 FL (ref 7–12)
POTASSIUM SERPL-SCNC: 4.1 MMOL/L (ref 3.5–5)
RBC # BLD: 3.75 E12/L (ref 3.5–5.5)
SODIUM BLD-SCNC: 138 MMOL/L (ref 132–146)
WBC # BLD: 12.8 E9/L (ref 4.5–11.5)

## 2022-12-29 PROCEDURE — 97530 THERAPEUTIC ACTIVITIES: CPT

## 2022-12-29 PROCEDURE — 85027 COMPLETE CBC AUTOMATED: CPT

## 2022-12-29 PROCEDURE — 36415 COLL VENOUS BLD VENIPUNCTURE: CPT

## 2022-12-29 PROCEDURE — G0378 HOSPITAL OBSERVATION PER HR: HCPCS

## 2022-12-29 PROCEDURE — 6370000000 HC RX 637 (ALT 250 FOR IP): Performed by: ORTHOPAEDIC SURGERY

## 2022-12-29 PROCEDURE — 97535 SELF CARE MNGMENT TRAINING: CPT

## 2022-12-29 PROCEDURE — 6360000002 HC RX W HCPCS: Performed by: ORTHOPAEDIC SURGERY

## 2022-12-29 PROCEDURE — 2580000003 HC RX 258: Performed by: ORTHOPAEDIC SURGERY

## 2022-12-29 PROCEDURE — 80048 BASIC METABOLIC PNL TOTAL CA: CPT

## 2022-12-29 RX ADMIN — SODIUM CHLORIDE, PRESERVATIVE FREE 10 ML: 5 INJECTION INTRAVENOUS at 04:08

## 2022-12-29 RX ADMIN — ASPIRIN 325 MG: 325 TABLET, COATED ORAL at 08:54

## 2022-12-29 RX ADMIN — DEXAMETHASONE SODIUM PHOSPHATE 10 MG: 10 INJECTION INTRAMUSCULAR; INTRAVENOUS at 06:44

## 2022-12-29 RX ADMIN — DOCUSATE SODIUM 50 MG AND SENNOSIDES 8.6 MG 1 TABLET: 8.6; 5 TABLET, FILM COATED ORAL at 08:54

## 2022-12-29 RX ADMIN — WATER 1000 MG: 1 INJECTION INTRAMUSCULAR; INTRAVENOUS; SUBCUTANEOUS at 04:08

## 2022-12-29 RX ADMIN — SODIUM CHLORIDE, PRESERVATIVE FREE 10 ML: 5 INJECTION INTRAVENOUS at 08:54

## 2022-12-29 RX ADMIN — ACETAMINOPHEN 650 MG: 325 TABLET ORAL at 04:08

## 2022-12-29 RX ADMIN — ACETAMINOPHEN 650 MG: 325 TABLET ORAL at 11:08

## 2022-12-29 RX ADMIN — SODIUM CHLORIDE, PRESERVATIVE FREE 10 ML: 5 INJECTION INTRAVENOUS at 06:44

## 2022-12-29 ASSESSMENT — PAIN DESCRIPTION - LOCATION: LOCATION: HIP

## 2022-12-29 ASSESSMENT — PAIN DESCRIPTION - ORIENTATION: ORIENTATION: RIGHT

## 2022-12-29 ASSESSMENT — PAIN DESCRIPTION - PAIN TYPE: TYPE: SURGICAL PAIN

## 2022-12-29 ASSESSMENT — PAIN SCALES - GENERAL
PAINLEVEL_OUTOF10: 0
PAINLEVEL_OUTOF10: 0
PAINLEVEL_OUTOF10: 2

## 2022-12-29 ASSESSMENT — PAIN DESCRIPTION - ONSET: ONSET: ON-GOING

## 2022-12-29 ASSESSMENT — PAIN DESCRIPTION - FREQUENCY: FREQUENCY: CONTINUOUS

## 2022-12-29 ASSESSMENT — PAIN DESCRIPTION - DESCRIPTORS: DESCRIPTORS: ACHING;DISCOMFORT;SORE

## 2022-12-29 ASSESSMENT — PAIN - FUNCTIONAL ASSESSMENT: PAIN_FUNCTIONAL_ASSESSMENT: ACTIVITIES ARE NOT PREVENTED

## 2022-12-29 NOTE — PROGRESS NOTES
Occupational Therapy  OT BEDSIDE TREATMENT NOTE      Date:2022  Patient Name: Bg Morales  MRN: [id number]  : 1955  Room: [address] [place] [address]     Evaluating OT: Rashmi Tran OTR/L - [id number]     Referring Provider: Mee Moore MD  Specific Provider Orders/Date: \"OT eval and treat\" - 2022     Diagnosis: Closed displaced articular fracture of head of right femur, initial encounter ([facility] [S72.061A]  Presence of retained hardware [Z96.9]  Arthritis of right hip [M16.11]  Osteoarthritis of right hip, unspecified osteoarthritis type [M16.11]  Primary osteoarthritis of right hip [M16.11]      Surgery: Patient underwent RIGHT HIP HARDWARE REMOVAL (ELLY) CONVERSION TO RIGHT HIP QUIRINO ROBOTIC ASSISTED TOTAL ARTHROPLASTY (Right: Hip) on 2022. Pertinent Medical History: CVA with residual L-sided weakness (), L foot drop,  COPD, anxiety, arthritis     Precautions: fall risk, posterolateral hip precautions, FWBAT, L foot drop - wears L AFO     Assessment of Current Deficits:    [x] Functional mobility             [x]ADLs           [x] Strength                  [x]Cognition   [x] Functional transfers           [x] IADLs         [x] Safety Awareness   [x]Endurance   [] Fine Coordination              [x] Balance      [] Vision/perception   [x]Sensation     []Gross Motor Coordination  [] ROM           [] Delirium                   [] Motor Control      OT PLAN OF CARE   OT POC is based on physician orders, patient diagnosis, and results of clinical assessment.   Frequency/Duration 2-5 days/week for 2 weeks PRN   Specific OT Treatment Interventions to Include:   * Instruction/training on adapted ADL techniques and AE recommendations to increase functional independence within precautions       * Training on energy conservation strategies, correct breathing pattern and techniques to improve independence/tolerance for self-care routine  * Functional transfer/mobility training/DME recommendations for increased independence, safety, and fall prevention  * Patient/Family education to increase follow through with safety techniques and functional independence  * Recommendation of environmental modifications for increased safety with functional transfers/mobility and ADLs  * Therapeutic exercise to improve motor endurance, ROM, and functional strength for ADLs/functional transfers  * Therapeutic activities to facilitate/challenge dynamic balance, stand tolerance for increased safety and independence with ADLs  * Neuro-muscular re-education: facilitation of righting/equilibrium reactions, midline orientation, scapular stability/mobility, normalization of muscle tone, and facilitation of volitional active controled movement     Recommended Adaptive Equipment: ww, 3 in 1, ETB      Home Living: Patient lives alone in a one-floor apartment (elevator access); patient plans to stay at her daughter's home upon discharge (one-floor plan, 2 steps to enter). Bathroom Setup: tub shower (at both patient's apartment and patient's daughter's home)   Equipment Owned: tub seat, extended tub bench, rollator, hurry-cane     Prior Level of Function (PLOF): Patient was independent ADLs, most IADLs, and functional mobility (with use of cane prior to recent fall). Driving: Yes     Pain Level: Patient did not specify  Cognition: Patient alert and oriented x3. WFL command follow demonstrated. Patient pleasant, cooperative, and motivated to return to PLOF. Memory: WFL  Sequencing: WFL  Problem Solving: WFL  Judgement/Safety: WFL     Functional Assessment:                  AM-PAC Daily Activity Raw Score: 17/24    Initial Eval Status  Date: 12/28/2022 Treatment Status  Date:  12/29/22 Short Term Goals = Long Term Goals   Feeding Independent   N/A   Grooming CGA   Mod I (standing at sink)   UB Dressing SBA   Mod I (including item retrieval)   LB Dressing Max A to don L AFO and bilateral shoes while seated at EOB.  Cues needed to ensure adherence to posterolateral hip precautions. max A to don AFO and shoes, min A for mock LB dressing for pants and min A to don/doff socks with use of AE after instruction. Patient states she plans to have daughter assist with LB ADLs in order to safely maintain hip precautions. AE distributed Mod I while demonstrating Good adherence to posterolateral hip precautions and Good understanding of AE use. Bathing Mod A Patient educated on benefits of ETB to maximize safety, patient familiar with use from previous stroke and receptive. Social work notified of need for ETB, patient encouraged to practice tub transfer with home therapy. She verbalized understanding  Mod I while demonstrating Good adherence to posterolateral hip precautions and Good understanding of AE/DME use. Toileting Min A   Mod I   Bed Mobility  Supine-to-Sit: SBA      Independent in order to maximize patient's independence with ADLs, re-positioning, and other functional tasks. Functional Transfers Sit-to-Stand: CGA   from EOB Sit <> stand CGA  Independent   Functional Mobility CGA (with walker) for household+ distance within patient's room and hallway. Increased fabian needed. CGA in room using [employer] Mod I with functional mobility (with device, as needed/appropriate) in order to maximize independence with ADLs/IADLs and other functional tasks. Balance Sitting: Good (at EOB)  Standing: Fair- to [address] (with walker)  supervision sitting  CGA standing with [employer] Fair+ dynamic standing balance during completion of ADLs/IADLs and other functional tasks. Activity Tolerance Fair  fair Patient will demonstrate Good understanding and consistent implementation of energy conservation techniques and work simplification techniques into ADL/IADL routines. Visual/  Perceptual WFL      N/A   B UE Strength 4-/5   Patient will demonstrate 4+/5 B UE strength in order to maximize independence with ADLs/IADLs and functional transfers.      Comments:  patient agreeable to session. Hip precautions reviewed with fair understanding, occasional prompting required to maintain hip precautions throughout session. Patient states that daughter will be able to assist with adl/iadl tasks to maximize safety. Patient with good effort and participation. Patient sitting EOB with call light in reach at the end of the session    Education/treatment: ADL and functional transfer/activity performed to increase safety and independence during self care tasks. Education provided on safety awareness, adl reeducation, functional transfer training, AE training, hip precautions    Pt has made progress towards set goals.      Time In: 8:58  Time Out: 9:40     Min Units   Therapeutic Ex 44232     Therapeutic Activities 80504 80 0   ADL/Self Care 20041 32 2   Orthotic Management 39567     Neuro Re-Ed 25946     Non-Billable Time     TOTAL TIMED TREATMENT 42 [address] [facility] OSCAR/GEOFF [id number]

## 2022-12-29 NOTE — PLAN OF CARE
Problem: Chronic Conditions and Co-morbidities  Goal: Patient's chronic conditions and co-morbidity symptoms are monitored and maintained or improved  12/29/2022 1007 by Priscila Hitchcock RN  Outcome: Completed  12/28/2022 2239 by Makenzie Carrera RN  Outcome: Progressing  Flowsheets (Taken 12/28/2022 2015)  Care Plan - Patient's Chronic Conditions and Co-Morbidity Symptoms are Monitored and Maintained or Improved: Monitor and assess patient's chronic conditions and comorbid symptoms for stability, deterioration, or improvement     Problem: Discharge Planning  Goal: Discharge to home or other facility with appropriate resources  Outcome: Completed  Flowsheets (Taken 12/28/2022 2015 by Makenzie Carrera, RN)  Discharge to home or other facility with appropriate resources: Identify discharge learning needs (meds, wound care, etc)     Problem: Pain  Goal: Verbalizes/displays adequate comfort level or baseline comfort level  12/29/2022 1007 by Priscila Hitchcock RN  Outcome: Completed  12/28/2022 2239 by Makenzie Carrera RN  Outcome: Progressing  Flowsheets (Taken 12/28/2022 1943)  Verbalizes/displays adequate comfort level or baseline comfort level:   Encourage patient to monitor pain and request assistance   Assess pain using appropriate pain scale   Administer analgesics based on type and severity of pain and evaluate response   Implement non-pharmacological measures as appropriate and evaluate response     Problem: Safety - Adult  Goal: Free from fall injury  12/29/2022 1007 by Priscila Hitchcock RN  Outcome: Completed  12/28/2022 2239 by Makenzie Carrear RN  Outcome: Progressing

## 2022-12-29 NOTE — DISCHARGE SUMMARY
Carol Ann Bryant  [id number]  79 y.o.  1955    Admit date: 12/28/2022    Discharge date and time: No discharge date for patient encounter. Admitting Physician: MONICA Hall MD    Discharge Physician: Gladys Kimble. Mellisa Hall MD    Admission Diagnoses: right Hip Osteoarthritis    Discharge Diagnoses: Same    Admission Condition: Good    Discharged Condition: Good    Procedure and Date: right Total Hip Arthroplasty, removal hardware    Hospital Course: A total hip arthroplasty was performed on 12/28/2022. Following this procedure the patient was admitted for a 1 day postoperative hospital stay. During this admission, DVT prophylaxis was followed using bilateral ICDs and ASA. Post-operative pain control was achieved with the use of IV/oral pain medications. Discharge plans were discussed with the patient with the aid of . Disposition: Home    Patient Instructions:     Medications for pain:    Acetaminophen - Take one tablet every 6 hours for 1 week. Then take every 6 hours as needed for pain. Oxycodone 1 tablet every 4 hours as needed for pain. You can take 2 every 6 hours for severe pain. Medication for DVT prophylaxis (Prevention of blood clots)  Aspirin - Take 1 tablet daily for 6 weeks for prevention of blood clots    Medication for constipation:  Colace - Take 1 tablet every 12 hours as needed for constipation        Activity: Weight bear as tolerated with a walker. Posterior hip precautions. Diet: regular diet  Wound Care: Patient should remove dressing in 9 days. Patient can shower with the dressing on but should not bathe. After removing, the dressing keep incision clean and dry    Follow-up with Dr. Beck Alexandre in 2 weeks.

## 2022-12-29 NOTE — PROGRESS NOTES
Physical Therapy  Facility/Department: [facility]  Physical Therapy Rx note     Name: Kenji Henson  : 1955  MRN: [id number]  Date of Service: 2022               Patient Diagnosis(es): The primary encounter diagnosis was Primary osteoarthritis of right hip. Diagnoses of Closed displaced articular fracture of head of right femur, initial encounter ([facility] Presence of retained hardware, and Arthritis of right hip were also pertinent to this visit. Past Medical History:  has a past medical history of Anxiety, Arthritis, Cerebrovascular accident (CVA) due to occlusion of small artery ([facility] COPD (chronic obstructive pulmonary disease) ([facility] Hip pain, right, Numbness and tingling of both legs, and Thyroid disease. Past Surgical History:  has a past surgical history that includes Tonsillectomy; Appendectomy;  section; Hip fracture surgery (Right, 2021); Carpal tunnel release (Bilateral); and Total hip arthroplasty (Right, 2022). Evaluating Therapist: Leandra Villafuerte PT     Referring Provider:  Montserrat Joseph MD    PT order : PT eval and treat     Room #: 034   DIAGNOSIS: Diagnoses of Closed displaced articular fracture of head of right femur, initial encounter ([facility] Presence of retained hardware, and Arthritis of right hip were pertinent to this visit. S/p R JENNIFER revision   Additional Pertinent History: CVA   PRECAUTIONS:  falls, L AFO, posterolateral hip, FWBAT   Equipment recommendations:   ww   Social:  Pt lives alone in a 1  floor plan  no  steps  to enter - has elevator . Plans to stay with her daughter in a 1 floor plan, steps with HR to enter   Prior to admission pt walked with rollator      Initial Evaluation  Date:  2022  Treatment        Short Term/ Long Term   Goals   Was pt agreeable to Eval/treatment? Yes  yes    Does pt have pain?   R hip  Right hip   Pain controlled     Bed Mobility  Rolling:  NT   Supine to sit: SBA   Sit to supine: NT   Scooting:  SBA  Sit to supine min for L leg   Scooting sba   Independent    Transfers Sit to stand:  CGA   Stand to sit:  CGA   Stand pivot:  NT  Sit <> stand min/cga  Stand pivot ww cga   Independent    Ambulation     80  feet with ww  with  CGA   150 feet x 2 ww sba to cga     AFO on her L LE   150-200 feet with  ww  with  S/I        Stair negotiation: ascended and descended NT  4 steps with B HR   Leading with L min assist  See comments   4  steps with  1  rail with SBA   LE ROM  Has L foot drop/inversion   R hip WFL adhering to precautions      LE strength  R hip 3- to 3/ 5 distally 4/ 5      AM- PAC RAW score  18/ 24            Pt is alert and Oriented x 4      Balance:  CGA . Fall risk due to  h/o falls, weakness, decreased balance   Endurance: WFL        ASSESSMENT  Pt displays functional ability as noted in the objective portion of this evaluation. Conditions Requiring Skilled Therapeutic Intervention:    [x]Decreased strength     [x]Decreased ROM  [x]Decreased functional mobility  [x]Decreased balance   [x]Decreased endurance   []Decreased posture  []Decreased sensation  []Decreased coordination   []Decreased vision  []Decreased safety awareness   [x]Increased pain         Treatment/Education:    Pt in bed  upon arrival ; agreeable to PT. Instructed in APs, LAQ , GS, and hip precautions prior to mobility. L AFO and shoes  were donned by OT earlier. Pt performed mobility as per above. Pt inconsistent with walker placement and requires cues to focus - easily distracted. Pt had no lob with gait but requires increase time recommend sba/cga. Pt reports has to step up onto a low porch then small step up into house. Performed simulated platform step at her doorway then had her use steps and she performed 4 steps b hr with min assist.  Descending had her step down last step to her walker with pt putting hands on walker with min/cga assist. Assist needed for her L leg to get into the bed.   Pt required cues for technique. With sit to stand pt at one time required assist to stand from the chain. The second time pt able to stand much better. Verbally reviewed car transfer with pt. Recommend ww and that pt have assist at home and nursing notified. Pt educated on fall risk,  safety with mobility        Patient response to education:   Pt verbalized understanding Pt demonstrated skill Pt requires further education in this area   x With cues   X        Comments:  Pt left  in bed, with call light in reach and bed alarm on. Rehab potential is Good for reaching above PT goals. Pts/ family goals   1. Home     Patient and or family understand(s) diagnosis, prognosis, and plan of care. -  yes     PLAN  PT care will be provided in accordance with the objectives noted above. Whenever appropriate, clear delegation orders will be provided for nursing staff. Exercises and functional mobility practice will be used as well as appropriate assistive devices or modalities to obtain goals. Patient and family education will also be administered as needed. PLAN OF CARE:    Current Treatment Recommendations     [x] Strengthening to improve independence with functional mobility   [x] ROM to improve independence with functional mobility   [x] Balance Training to improve static/dynamic balance and to reduce fall risk  [x] Endurance Training to improve activity tolerance during functional mobility   [x] Transfer Training to improve safety and independence with all functional transfers   [x] Gait Training to improve gait mechanics, endurance and assess need for appropriate assistive device  [x] Stair Training in preparation for safe discharge home and/or into the community   [x] Positioning to prevent skin breakdown and contractures  [x] Safety and Education Training   [] Patient/Caregiver Education   [] HEP  [] Other     Frequency of treatments will be BID x2 days.     Time in: 943 Time out: 1030         CPT codes:  [] Low Complexity PT evaluation 65702  [] Moderate Complexity PT evaluation 23680  [] High Complexity PT evaluation 50683  [] PT Re-evaluation 94387  [] Gait training 08580  minutes  [x] Therapeutic activities 38257 47 minutes  [] Therapeutic exercises 33105  minutes  [] Neuromuscular reeducation 26463  minutes       Julianne Emanuel, [address] [place] [address]

## 2022-12-29 NOTE — PROGRESS NOTES
CLINICAL PHARMACY NOTE: MEDS TO BEDS    Total # of Prescriptions Filled: 2   The following medications were delivered to the patient:  Oxycodone 5  senna    Additional Documentation:

## 2022-12-29 NOTE — DISCHARGE INSTRUCTIONS
Orthopaedic Patient Instructions:     Medications for pain:    Acetaminophen - Take one tablet every 6 hours for 1 week. Then take every 6 hours as needed for pain. Oxycodone 1 tablet every 4 hours as needed for pain. You can take 2 every 6 hours for severe pain. Medication for DVT prophylaxis (Prevention of blood clots)  Aspirin - Take 1 tablet daily for 6 weeks for prevention of blood clots    Medication for constipation:  Colace - Take 1 tablet every 12 hours as needed for constipation        Activity: activity as tolerated  Diet: regular diet  Wound Care: Patient should remove dressing in 9 days. Patient can shower with the dressing on but should not bathe. After removing, the dressing keep incision clean and dry    Follow-up with Dr. Dinh Frazier in 2 weeks.   Call for an appointment

## 2022-12-29 NOTE — PROGRESS NOTES
Orthopaedic Surgery Progress Note      SUBJECTIVE:  No acute events over night. Pain controlled. Denies chest pain or shortness of breath.     OBJECTIVE:   AAOx3, NAD    Right lower extremity    Dressings C/D/I  SILT L1-S1  TA/EHL/GS intact  Calf soft, NT  +2 DP, W/WP     Data:  CBC:   Lab Results   Component Value Date/Time    WBC 12.8 12/29/2022 02:52 AM    RBC 3.75 12/29/2022 02:52 AM    HGB 11.4 12/29/2022 02:52 AM    HCT 34.1 12/29/2022 02:52 AM    MCV 90.9 12/29/2022 02:52 AM    MCH 30.4 12/29/2022 02:52 AM    MCHC 33.4 12/29/2022 02:52 AM    RDW 13.6 12/29/2022 02:52 AM     12/29/2022 02:52 AM    MPV 11.0 12/29/2022 02:52 AM     BMP:    Lab Results   Component Value Date/Time     12/29/2022 02:52 AM    K 4.1 12/29/2022 02:52 AM    K 3.8 07/26/2021 04:25 AM     12/29/2022 02:52 AM    CO2 25 12/29/2022 02:52 AM    BUN 11 12/29/2022 02:52 AM    LABALBU 4.3 12/19/2020 01:13 PM    LABALBU 4.7 02/20/2012 05:00 PM    CREATININE 0.6 12/29/2022 02:52 AM    CALCIUM 8.8 12/29/2022 02:52 AM    GFRAA >60 07/26/2021 04:25 AM    LABGLOM >60 12/29/2022 02:52 AM    GLUCOSE 100 12/29/2022 02:52 AM    GLUCOSE 93 02/20/2012 05:00 PM         A/P: POD 1 right hip hardware removal conversion to right total hip arthroplasty   - WBAT  - Pain control  - PT/OT  - DVT prophylaxis  - D/C planning    Di Dale APRN-CNP

## 2022-12-29 NOTE — CARE COORDINATION
Social Work:    Reviewed chart notes. Sx post operative day one of:    Procedure:     1. Removal 3 cannulated screw right femoral neck  2. Conversion to [facility] right total hip arthroplasty    James Saleem was met in ortho camp December 19th as well as today and is aware of all information pertaining to the discharge process. James Saleem plans to discharge to her one floor [facility] She requested a wheeled walker, 3-1 commode, ETB, and HHC. Choices for [facility] & DME were provided and James Saleem deferred preference to surgeon, therefore a referral was given to [facility] for delivery of equipment to room this a.m. and Krista at [facility] who accepted and is aware of discharge home today. Electronically signed by BOYD Davila on 12/29/2022 at 10:29 AM    The Plan for Transition of Care is related to the following treatment goals: HHC, DME, SNF    The Patient and/or patient representative Patty Resendiz was provided with a choice of provider and agrees with the discharge plan. [x] Yes [] No    Freedom of choice list was provided with basic dialogue that supports the patient's individualized plan of care/goals, treatment preferences and shares the quality data associated with the providers.  [x] Yes [] No    Electronically signed by BOYD Davila on 12/29/2022 at 10:30 AM

## 2023-08-07 NOTE — PLAN OF CARE
MIGUEL Bojorquez, PNP discussed that patient should qualify for inpatient hospice due to high O2 requirements.  Patient was residing at [facility].  She now needs a higher level of care.  Windy reached out to patient's daughter and to Jeanne at [facility].  Per Jeanne patient can be accepted to inpatient hospice.  Jeanne attempting to reach the daughter to discuss.       08/07/23 1450   Discharge Reassessment   Assessment Type Discharge Planning Reassessment   Did the patient's condition or plan change since previous assessment? Yes   Discharge Plan discussed with:   (Patient discussed in rounds this am.)   Communicated JULIANNA with patient/caregiver No   Discharge Plan A Inpatient Hospice   Discharge Plan B New Nursing Home placement - alf care facility  (with hospice)   DME Needed Upon Discharge  none   Transition of Care Barriers Other (see comments)  (High O2 requirements)   Post-Acute Status   Post-Acute Authorization Hospice   Hospice Status Referrals Sent   Patient choice form signed by patient/caregiver   (Resumption)   Discharge Delays (!) Post-Acute Set-up        Problem: Skin Integrity:  Goal: Will show no infection signs and symptoms  Description: Will show no infection signs and symptoms  Outcome: Met This Shift  Goal: Absence of new skin breakdown  Description: Absence of new skin breakdown  Outcome: Met This Shift     Problem: Falls - Risk of:  Goal: Will remain free from falls  Description: Will remain free from falls  Outcome: Met This Shift  Goal: Absence of physical injury  Description: Absence of physical injury  Outcome: Met This Shift     Problem: Neurological  Goal: Maximum potential motor/sensory/cognitive function  Outcome: Met This Shift     Problem: Safety:  Goal: Free from accidental physical injury  Description: Free from accidental physical injury  Outcome: Met This Shift  Goal: Free from intentional harm  Description: Free from intentional harm  Outcome: Met This Shift     Problem: Daily Care:  Goal: Daily care needs are met  Description: Daily care needs are met  Outcome: Met This Shift     Problem: Pain:  Goal: Patient's pain/discomfort is manageable  Description: Patient's pain/discomfort is manageable  Outcome: Met This Shift     Problem: Skin Integrity:  Goal: Skin integrity will stabilize  Description: Skin integrity will stabilize  Outcome: Met This Shift

## 2023-09-07 ENCOUNTER — HOSPITAL ENCOUNTER (OUTPATIENT)
Dept: MRI IMAGING | Age: 68
Discharge: HOME | End: 2023-09-09
Attending: FAMILY MEDICINE
Payer: COMMERCIAL

## 2023-09-07 DIAGNOSIS — M43.03: ICD-10-CM

## 2023-09-07 DIAGNOSIS — H53.2 DIPLOPIA: ICD-10-CM

## 2023-09-07 DIAGNOSIS — R25.1 TREMOR, UNSPECIFIED: ICD-10-CM

## 2023-09-07 DIAGNOSIS — R25.1 TREMOR: ICD-10-CM

## 2023-09-07 PROCEDURE — 70551 MRI BRAIN STEM W/O DYE: CPT

## 2023-09-07 PROCEDURE — 72141 MRI NECK SPINE W/O DYE: CPT

## 2023-12-04 ENCOUNTER — HOSPITAL ENCOUNTER (OUTPATIENT)
Dept: PHYSICAL THERAPY | Age: 68
Setting detail: THERAPIES SERIES
Discharge: HOME | End: 2023-12-04
Payer: COMMERCIAL

## 2023-12-04 PROCEDURE — 97161 PT EVAL LOW COMPLEX 20 MIN: CPT

## 2023-12-06 ENCOUNTER — APPOINTMENT (OUTPATIENT)
Dept: GENERAL RADIOLOGY | Age: 68
DRG: 481 | End: 2023-12-06
Payer: COMMERCIAL

## 2023-12-06 ENCOUNTER — APPOINTMENT (OUTPATIENT)
Dept: CT IMAGING | Age: 68
DRG: 481 | End: 2023-12-06
Payer: COMMERCIAL

## 2023-12-06 ENCOUNTER — HOSPITAL ENCOUNTER (INPATIENT)
Age: 68
LOS: 5 days | Discharge: HOME | DRG: 481 | End: 2023-12-11
Attending: STUDENT IN AN ORGANIZED HEALTH CARE EDUCATION/TRAINING PROGRAM | Admitting: FAMILY MEDICINE
Payer: COMMERCIAL

## 2023-12-06 ENCOUNTER — ANESTHESIA (OUTPATIENT)
Dept: OPERATING ROOM | Age: 68
DRG: 481 | End: 2023-12-06
Payer: COMMERCIAL

## 2023-12-06 DIAGNOSIS — S72.142A CLOSED FRACTURE OF FEMUR, INTERTROCHANTERIC, LEFT, INITIAL ENCOUNTER (HCC): ICD-10-CM

## 2023-12-06 DIAGNOSIS — S72.002A CLOSED FRACTURE OF LEFT HIP, INITIAL ENCOUNTER (HCC): Primary | ICD-10-CM

## 2023-12-06 LAB
ABO + RH BLD: NORMAL
ALBUMIN SERPL-MCNC: 4.7 G/DL (ref 3.5–5.2)
ALP SERPL-CCNC: 75 U/L (ref 35–104)
ALT SERPL-CCNC: 16 U/L (ref 0–32)
ANION GAP SERPL CALCULATED.3IONS-SCNC: 11 MMOL/L (ref 7–16)
ARM BAND NUMBER: NORMAL
AST SERPL-CCNC: 25 U/L (ref 0–31)
BASOPHILS # BLD: 0.04 K/UL (ref 0–0.2)
BASOPHILS NFR BLD: 0 % (ref 0–2)
BILIRUB SERPL-MCNC: 0.3 MG/DL (ref 0–1.2)
BLOOD BANK SAMPLE EXPIRATION: NORMAL
BLOOD GROUP ANTIBODIES SERPL: NEGATIVE
BUN SERPL-MCNC: 14 MG/DL (ref 6–23)
CALCIUM SERPL-MCNC: 9.5 MG/DL (ref 8.6–10.2)
CHLORIDE SERPL-SCNC: 106 MMOL/L (ref 98–107)
CO2 SERPL-SCNC: 25 MMOL/L (ref 22–29)
CREAT SERPL-MCNC: 0.7 MG/DL (ref 0.5–1)
EOSINOPHIL # BLD: 0.07 K/UL (ref 0.05–0.5)
EOSINOPHILS RELATIVE PERCENT: 1 % (ref 0–6)
ERYTHROCYTE [DISTWIDTH] IN BLOOD BY AUTOMATED COUNT: 15.1 % (ref 11.5–15)
GFR SERPL CREATININE-BSD FRML MDRD: >60 ML/MIN/1.73M2
GLUCOSE SERPL-MCNC: 86 MG/DL (ref 74–99)
HCT VFR BLD AUTO: 41.8 % (ref 34–48)
HGB BLD-MCNC: 13.8 G/DL (ref 11.5–15.5)
IMM GRANULOCYTES # BLD AUTO: 0.08 K/UL (ref 0–0.58)
IMM GRANULOCYTES NFR BLD: 1 % (ref 0–5)
LYMPHOCYTES NFR BLD: 1.45 K/UL (ref 1.5–4)
LYMPHOCYTES RELATIVE PERCENT: 10 % (ref 20–42)
MAGNESIUM SERPL-MCNC: 2 MG/DL (ref 1.6–2.6)
MCH RBC QN AUTO: 31.7 PG (ref 26–35)
MCHC RBC AUTO-ENTMCNC: 33 G/DL (ref 32–34.5)
MCV RBC AUTO: 96.1 FL (ref 80–99.9)
MONOCYTES NFR BLD: 0.76 K/UL (ref 0.1–0.95)
MONOCYTES NFR BLD: 5 % (ref 2–12)
NEUTROPHILS NFR BLD: 83 % (ref 43–80)
NEUTS SEG NFR BLD: 12.08 K/UL (ref 1.8–7.3)
PLATELET # BLD AUTO: 289 K/UL (ref 130–450)
PMV BLD AUTO: 9.9 FL (ref 7–12)
POTASSIUM SERPL-SCNC: 3.5 MMOL/L (ref 3.5–5)
PROT SERPL-MCNC: 7.1 G/DL (ref 6.4–8.3)
RBC # BLD AUTO: 4.35 M/UL (ref 3.5–5.5)
SODIUM SERPL-SCNC: 142 MMOL/L (ref 132–146)
WBC OTHER # BLD: 14.5 K/UL (ref 4.5–11.5)

## 2023-12-06 PROCEDURE — 70450 CT HEAD/BRAIN W/O DYE: CPT

## 2023-12-06 PROCEDURE — 83735 ASSAY OF MAGNESIUM: CPT

## 2023-12-06 PROCEDURE — 1200000000 HC SEMI PRIVATE

## 2023-12-06 PROCEDURE — 73630 X-RAY EXAM OF FOOT: CPT

## 2023-12-06 PROCEDURE — 85025 COMPLETE CBC W/AUTO DIFF WBC: CPT

## 2023-12-06 PROCEDURE — 80053 COMPREHEN METABOLIC PANEL: CPT

## 2023-12-06 PROCEDURE — 73610 X-RAY EXAM OF ANKLE: CPT

## 2023-12-06 PROCEDURE — 86850 RBC ANTIBODY SCREEN: CPT

## 2023-12-06 PROCEDURE — 86900 BLOOD TYPING SEROLOGIC ABO: CPT

## 2023-12-06 PROCEDURE — 86901 BLOOD TYPING SEROLOGIC RH(D): CPT

## 2023-12-06 PROCEDURE — 73562 X-RAY EXAM OF KNEE 3: CPT

## 2023-12-06 PROCEDURE — 99285 EMERGENCY DEPT VISIT HI MDM: CPT

## 2023-12-06 PROCEDURE — 6360000002 HC RX W HCPCS: Performed by: STUDENT IN AN ORGANIZED HEALTH CARE EDUCATION/TRAINING PROGRAM

## 2023-12-06 PROCEDURE — 73502 X-RAY EXAM HIP UNI 2-3 VIEWS: CPT

## 2023-12-06 PROCEDURE — 96374 THER/PROPH/DIAG INJ IV PUSH: CPT

## 2023-12-06 PROCEDURE — 71045 X-RAY EXAM CHEST 1 VIEW: CPT

## 2023-12-06 PROCEDURE — 72125 CT NECK SPINE W/O DYE: CPT

## 2023-12-06 RX ORDER — PREGABALIN 75 MG/1
300 CAPSULE ORAL 2 TIMES DAILY
Status: DISCONTINUED | OUTPATIENT
Start: 2023-12-06 | End: 2023-12-11 | Stop reason: HOSPADM

## 2023-12-06 RX ORDER — MORPHINE SULFATE 2 MG/ML
2 INJECTION, SOLUTION INTRAMUSCULAR; INTRAVENOUS EVERY 4 HOURS PRN
Status: DISCONTINUED | OUTPATIENT
Start: 2023-12-06 | End: 2023-12-08 | Stop reason: SDUPTHER

## 2023-12-06 RX ORDER — ONDANSETRON 2 MG/ML
4 INJECTION INTRAMUSCULAR; INTRAVENOUS EVERY 6 HOURS PRN
Status: DISCONTINUED | OUTPATIENT
Start: 2023-12-06 | End: 2023-12-07 | Stop reason: SDUPTHER

## 2023-12-06 RX ORDER — BUDESONIDE, GLYCOPYRROLATE, AND FORMOTEROL FUMARATE 160; 9; 4.8 UG/1; UG/1; UG/1
2 AEROSOL, METERED RESPIRATORY (INHALATION) 2 TIMES DAILY
COMMUNITY
Start: 2023-12-01

## 2023-12-06 RX ORDER — LEVOTHYROXINE SODIUM 0.05 MG/1
50 TABLET ORAL DAILY
Status: DISCONTINUED | OUTPATIENT
Start: 2023-12-07 | End: 2023-12-11 | Stop reason: HOSPADM

## 2023-12-06 RX ORDER — CETIRIZINE HYDROCHLORIDE 10 MG/1
10 TABLET ORAL DAILY
Status: DISCONTINUED | OUTPATIENT
Start: 2023-12-07 | End: 2023-12-11 | Stop reason: HOSPADM

## 2023-12-06 RX ORDER — CLOTRIMAZOLE AND BETAMETHASONE DIPROPIONATE 10; .64 MG/G; MG/G
1 CREAM TOPICAL 2 TIMES DAILY
COMMUNITY
Start: 2023-11-09

## 2023-12-06 RX ORDER — MORPHINE SULFATE 4 MG/ML
4 INJECTION, SOLUTION INTRAMUSCULAR; INTRAVENOUS ONCE
Status: COMPLETED | OUTPATIENT
Start: 2023-12-06 | End: 2023-12-06

## 2023-12-06 RX ORDER — ASPIRIN 325 MG
325 TABLET, DELAYED RELEASE (ENTERIC COATED) ORAL DAILY
Status: DISCONTINUED | OUTPATIENT
Start: 2023-12-07 | End: 2023-12-11 | Stop reason: HOSPADM

## 2023-12-06 RX ORDER — ALBUTEROL SULFATE 90 UG/1
2 AEROSOL, METERED RESPIRATORY (INHALATION) EVERY 4 HOURS PRN
COMMUNITY
Start: 2023-10-26

## 2023-12-06 RX ORDER — ARFORMOTEROL TARTRATE 15 UG/2ML
15 SOLUTION RESPIRATORY (INHALATION)
Status: DISCONTINUED | OUTPATIENT
Start: 2023-12-07 | End: 2023-12-11 | Stop reason: HOSPADM

## 2023-12-06 RX ADMIN — MORPHINE SULFATE 4 MG: 4 INJECTION, SOLUTION INTRAMUSCULAR; INTRAVENOUS at 18:45

## 2023-12-06 ASSESSMENT — PAIN DESCRIPTION - ORIENTATION
ORIENTATION: LEFT
ORIENTATION: LEFT

## 2023-12-06 ASSESSMENT — PAIN DESCRIPTION - LOCATION
LOCATION: HIP
LOCATION: HIP

## 2023-12-06 ASSESSMENT — PAIN SCALES - GENERAL
PAINLEVEL_OUTOF10: 10
PAINLEVEL_OUTOF10: 10
PAINLEVEL_OUTOF10: 6

## 2023-12-06 ASSESSMENT — PAIN DESCRIPTION - PAIN TYPE: TYPE: ACUTE PAIN

## 2023-12-06 NOTE — ED TRIAGE NOTES
FIRST PROVIDER CONTACT ASSESSMENT NOTE       Department of Emergency Medicine                 First Provider Note            23  5:35 PM EST    Date of Encounter: No admission date for patient encounter. Patient Name: Kavita Cheung  : 1955  MRN: [id number]    Chief Complaint: Fall (Tripped and fell, denies hitting head, no LOC, no thinners, having pain whole left side from hip down)      History of Present Illness:   Kavita Cheung is a 76 y.o. female who presents to the ED for fall today. Went to grab walker and slipped. Has pain in left hip, knee and foot. Unable to bear weight     Focused Physical Exam:  VS:    ED Triage Vitals   BP Temp Temp src Pulse Resp SpO2 Height Weight   -- -- -- -- -- -- -- --        Physical Ex: Constitutional: Alert and non-toxic. Medical History:  has a past medical history of Anxiety, Arthritis, Cerebrovascular accident (CVA) due to occlusion of small artery ([address]), COPD (chronic obstructive pulmonary disease) ([address]), Hip pain, right, Numbness and tingling of both legs, and Thyroid disease. Surgical History:  has a past surgical history that includes Tonsillectomy; Appendectomy;  section; Hip fracture surgery (Right, 2021); Carpal tunnel release (Bilateral); and Total hip arthroplasty (Right, 2022). Social History:  reports that she quit smoking about a year ago. Her smoking use included cigarettes. She has a 26.50 pack-year smoking history. She does not have any smokeless tobacco history on file. She reports that she does not currently use alcohol. She reports that she does not use drugs. Family History: family history is not on file.     Allergies: Aspirin and Garlic oil     Initial Plan of Care: Initiate Treatment-Testing, Proceed toTreatment Area When Bed Available for ED Attending/MLP to Continue Care      ---END OF FIRST PROVIDER CONTACT ASSESSMENT NOTE---  Electronically signed by Oliver Baker PA-C   DD: 23

## 2023-12-06 NOTE — ED PROVIDER NOTES
HPI   28-year-old female patient presenting to emergency department evaluation of left-sided hip pain. Patient reporting that she had a trip and fall she went to reach for her walker but it was too far away she fell onto her left side. She did not hit her neck or back. She is having associated left hip pain. The left hip is shortened and rotated. She previously had right hip surgery by Dr. Genet Patel orthopedics. She is not on any anticoagulation. Review of Systems   Constitutional:  Negative for chills and fever. HENT:  Negative for congestion. Respiratory:  Negative for cough and shortness of breath. Cardiovascular:  Negative for chest pain. Gastrointestinal:  Negative for abdominal pain, diarrhea, nausea and vomiting. Genitourinary:  Negative for difficulty urinating, dysuria and hematuria. Musculoskeletal:  Negative for back pain. Left-sided hip pain   Skin:  Negative for color change. All other systems reviewed and are negative. Physical Exam  Vitals and nursing note reviewed. Constitutional:       General: She is not in acute distress. HENT:      Head: Normocephalic and atraumatic. Nose: Nose normal.      Mouth/Throat:      Pharynx: Oropharynx is clear. Eyes:      Conjunctiva/sclera: Conjunctivae normal.   Cardiovascular:      Rate and Rhythm: Normal rate and regular rhythm. Pulses: Normal pulses. Heart sounds: Normal heart sounds. Pulmonary:      Effort: Pulmonary effort is normal.      Breath sounds: Normal breath sounds. Abdominal:      General: There is no distension. Tenderness: There is no abdominal tenderness. Musculoskeletal:      Cervical back: Neck supple. Comments: Shortened and rotated left lower extremity. 2+ DP pulses bilaterally. No C-spine, T-spine, L-spine tenderness to palpation. She is able to move bilateral upper extremities and right lower extremity.    Skin:     Capillary Refill: Capillary refill takes less than 2

## 2023-12-07 ENCOUNTER — TELEPHONE (OUTPATIENT)
Dept: CASE MANAGEMENT | Age: 68
End: 2023-12-07

## 2023-12-07 LAB
BACTERIA URNS QL MICRO: ABNORMAL
BASOPHILS # BLD: 0.03 K/UL (ref 0–0.2)
BASOPHILS NFR BLD: 0 % (ref 0–2)
BILIRUB UR QL STRIP: NEGATIVE
CLARITY UR: CLEAR
COLOR UR: YELLOW
EOSINOPHIL # BLD: 0.07 K/UL (ref 0.05–0.5)
EOSINOPHILS RELATIVE PERCENT: 1 % (ref 0–6)
ERYTHROCYTE [DISTWIDTH] IN BLOOD BY AUTOMATED COUNT: 15.1 % (ref 11.5–15)
GLUCOSE UR STRIP-MCNC: NEGATIVE MG/DL
HCT VFR BLD AUTO: 36.7 % (ref 34–48)
HGB BLD-MCNC: 12 G/DL (ref 11.5–15.5)
HGB UR QL STRIP.AUTO: NEGATIVE
IMM GRANULOCYTES # BLD AUTO: <0.03 K/UL (ref 0–0.58)
IMM GRANULOCYTES NFR BLD: 0 % (ref 0–5)
KETONES UR STRIP-MCNC: NEGATIVE MG/DL
LEUKOCYTE ESTERASE UR QL STRIP: NEGATIVE
LYMPHOCYTES NFR BLD: 1.86 K/UL (ref 1.5–4)
LYMPHOCYTES RELATIVE PERCENT: 26 % (ref 20–42)
MCH RBC QN AUTO: 31.4 PG (ref 26–35)
MCHC RBC AUTO-ENTMCNC: 32.7 G/DL (ref 32–34.5)
MCV RBC AUTO: 96.1 FL (ref 80–99.9)
MONOCYTES NFR BLD: 0.61 K/UL (ref 0.1–0.95)
MONOCYTES NFR BLD: 9 % (ref 2–12)
NEUTROPHILS NFR BLD: 64 % (ref 43–80)
NEUTS SEG NFR BLD: 4.53 K/UL (ref 1.8–7.3)
NITRITE UR QL STRIP: POSITIVE
PH UR STRIP: 6 [PH] (ref 5–9)
PLATELET # BLD AUTO: 255 K/UL (ref 130–450)
PMV BLD AUTO: 9.7 FL (ref 7–12)
PROT UR STRIP-MCNC: NEGATIVE MG/DL
RBC # BLD AUTO: 3.82 M/UL (ref 3.5–5.5)
RBC #/AREA URNS HPF: ABNORMAL /HPF
SP GR UR STRIP: 1.02 (ref 1–1.03)
UROBILINOGEN UR STRIP-ACNC: 0.2 EU/DL (ref 0–1)
WBC #/AREA URNS HPF: ABNORMAL /HPF
WBC OTHER # BLD: 7.1 K/UL (ref 4.5–11.5)

## 2023-12-07 PROCEDURE — 81001 URINALYSIS AUTO W/SCOPE: CPT

## 2023-12-07 PROCEDURE — 94664 DEMO&/EVAL PT USE INHALER: CPT

## 2023-12-07 PROCEDURE — 87086 URINE CULTURE/COLONY COUNT: CPT

## 2023-12-07 PROCEDURE — 51798 US URINE CAPACITY MEASURE: CPT

## 2023-12-07 PROCEDURE — 1200000000 HC SEMI PRIVATE

## 2023-12-07 PROCEDURE — 6370000000 HC RX 637 (ALT 250 FOR IP): Performed by: FAMILY MEDICINE

## 2023-12-07 PROCEDURE — 6360000002 HC RX W HCPCS: Performed by: FAMILY MEDICINE

## 2023-12-07 PROCEDURE — 51702 INSERT TEMP BLADDER CATH: CPT

## 2023-12-07 PROCEDURE — 87077 CULTURE AEROBIC IDENTIFY: CPT

## 2023-12-07 PROCEDURE — 94640 AIRWAY INHALATION TREATMENT: CPT

## 2023-12-07 PROCEDURE — 2580000003 HC RX 258: Performed by: GENERAL ACUTE CARE HOSPITAL

## 2023-12-07 PROCEDURE — 85025 COMPLETE CBC W/AUTO DIFF WBC: CPT

## 2023-12-07 RX ORDER — MAGNESIUM SULFATE IN WATER 40 MG/ML
2000 INJECTION, SOLUTION INTRAVENOUS PRN
Status: DISCONTINUED | OUTPATIENT
Start: 2023-12-07 | End: 2023-12-11 | Stop reason: HOSPADM

## 2023-12-07 RX ORDER — POTASSIUM CHLORIDE 7.45 MG/ML
10 INJECTION INTRAVENOUS PRN
Status: DISCONTINUED | OUTPATIENT
Start: 2023-12-07 | End: 2023-12-11 | Stop reason: HOSPADM

## 2023-12-07 RX ORDER — SODIUM CHLORIDE 9 MG/ML
INJECTION, SOLUTION INTRAVENOUS PRN
Status: DISCONTINUED | OUTPATIENT
Start: 2023-12-07 | End: 2023-12-08 | Stop reason: SDUPTHER

## 2023-12-07 RX ORDER — SODIUM CHLORIDE 0.9 % (FLUSH) 0.9 %
5-40 SYRINGE (ML) INJECTION PRN
Status: DISCONTINUED | OUTPATIENT
Start: 2023-12-07 | End: 2023-12-11 | Stop reason: HOSPADM

## 2023-12-07 RX ORDER — SODIUM CHLORIDE 0.9 % (FLUSH) 0.9 %
5-40 SYRINGE (ML) INJECTION EVERY 12 HOURS SCHEDULED
Status: DISCONTINUED | OUTPATIENT
Start: 2023-12-07 | End: 2023-12-11 | Stop reason: HOSPADM

## 2023-12-07 RX ORDER — POLYETHYLENE GLYCOL 3350 17 G/17G
17 POWDER, FOR SOLUTION ORAL DAILY PRN
Status: DISCONTINUED | OUTPATIENT
Start: 2023-12-07 | End: 2023-12-11 | Stop reason: HOSPADM

## 2023-12-07 RX ORDER — ONDANSETRON 4 MG/1
4 TABLET, ORALLY DISINTEGRATING ORAL EVERY 8 HOURS PRN
Status: DISCONTINUED | OUTPATIENT
Start: 2023-12-07 | End: 2023-12-08 | Stop reason: SDUPTHER

## 2023-12-07 RX ORDER — ACETAMINOPHEN 325 MG/1
650 TABLET ORAL EVERY 4 HOURS PRN
Status: DISCONTINUED | OUTPATIENT
Start: 2023-12-07 | End: 2023-12-11 | Stop reason: HOSPADM

## 2023-12-07 RX ORDER — POTASSIUM CHLORIDE 20 MEQ/1
40 TABLET, EXTENDED RELEASE ORAL PRN
Status: DISCONTINUED | OUTPATIENT
Start: 2023-12-07 | End: 2023-12-11 | Stop reason: HOSPADM

## 2023-12-07 RX ORDER — ONDANSETRON 2 MG/ML
4 INJECTION INTRAMUSCULAR; INTRAVENOUS EVERY 6 HOURS PRN
Status: DISCONTINUED | OUTPATIENT
Start: 2023-12-07 | End: 2023-12-08 | Stop reason: SDUPTHER

## 2023-12-07 RX ADMIN — IPRATROPIUM BROMIDE 0.5 MG: 0.5 SOLUTION RESPIRATORY (INHALATION) at 20:21

## 2023-12-07 RX ADMIN — ASPIRIN 325 MG: 325 TABLET, COATED ORAL at 08:42

## 2023-12-07 RX ADMIN — SODIUM CHLORIDE, PRESERVATIVE FREE 10 ML: 5 INJECTION INTRAVENOUS at 00:27

## 2023-12-07 RX ADMIN — LEVOTHYROXINE SODIUM 50 MCG: 0.05 TABLET ORAL at 08:42

## 2023-12-07 RX ADMIN — ARFORMOTEROL TARTRATE 15 MCG: 15 SOLUTION RESPIRATORY (INHALATION) at 20:21

## 2023-12-07 RX ADMIN — CETIRIZINE HYDROCHLORIDE 10 MG: 10 TABLET, FILM COATED ORAL at 08:42

## 2023-12-07 RX ADMIN — MORPHINE SULFATE 2 MG: 2 INJECTION, SOLUTION INTRAMUSCULAR; INTRAVENOUS at 14:16

## 2023-12-07 RX ADMIN — IPRATROPIUM BROMIDE 0.5 MG: 0.5 SOLUTION RESPIRATORY (INHALATION) at 15:36

## 2023-12-07 RX ADMIN — IPRATROPIUM BROMIDE 0.5 MG: 0.5 SOLUTION RESPIRATORY (INHALATION) at 12:54

## 2023-12-07 RX ADMIN — PREGABALIN 300 MG: 75 CAPSULE ORAL at 20:00

## 2023-12-07 RX ADMIN — IPRATROPIUM BROMIDE 0.5 MG: 0.5 SOLUTION RESPIRATORY (INHALATION) at 08:23

## 2023-12-07 RX ADMIN — MORPHINE SULFATE 2 MG: 2 INJECTION, SOLUTION INTRAMUSCULAR; INTRAVENOUS at 05:06

## 2023-12-07 RX ADMIN — PREGABALIN 300 MG: 75 CAPSULE ORAL at 00:27

## 2023-12-07 RX ADMIN — ARFORMOTEROL TARTRATE 15 MCG: 15 SOLUTION RESPIRATORY (INHALATION) at 08:23

## 2023-12-07 RX ADMIN — MORPHINE SULFATE 2 MG: 2 INJECTION, SOLUTION INTRAMUSCULAR; INTRAVENOUS at 00:27

## 2023-12-07 RX ADMIN — SODIUM CHLORIDE, PRESERVATIVE FREE 10 ML: 5 INJECTION INTRAVENOUS at 20:01

## 2023-12-07 RX ADMIN — PREGABALIN 300 MG: 75 CAPSULE ORAL at 08:42

## 2023-12-07 RX ADMIN — SODIUM CHLORIDE, PRESERVATIVE FREE 10 ML: 5 INJECTION INTRAVENOUS at 08:42

## 2023-12-07 RX ADMIN — ACETAMINOPHEN 650 MG: 325 TABLET ORAL at 20:00

## 2023-12-07 ASSESSMENT — PAIN DESCRIPTION - ORIENTATION
ORIENTATION: LEFT

## 2023-12-07 ASSESSMENT — PAIN DESCRIPTION - LOCATION
LOCATION: HIP
LOCATION: HIP;LEG

## 2023-12-07 ASSESSMENT — PAIN DESCRIPTION - PAIN TYPE
TYPE: ACUTE PAIN
TYPE: ACUTE PAIN

## 2023-12-07 ASSESSMENT — PAIN DESCRIPTION - DESCRIPTORS
DESCRIPTORS: ACHING;DISCOMFORT
DESCRIPTORS: ACHING;DISCOMFORT;SORE;SHARP
DESCRIPTORS: ACHING;DISCOMFORT;SHARP
DESCRIPTORS: ACHING;DISCOMFORT;SORE;SHARP

## 2023-12-07 ASSESSMENT — PAIN SCALES - GENERAL
PAINLEVEL_OUTOF10: 5
PAINLEVEL_OUTOF10: 0
PAINLEVEL_OUTOF10: 8
PAINLEVEL_OUTOF10: 9
PAINLEVEL_OUTOF10: 6
PAINLEVEL_OUTOF10: 7
PAINLEVEL_OUTOF10: 4

## 2023-12-07 ASSESSMENT — ENCOUNTER SYMPTOMS
NAUSEA: 0
COLOR CHANGE: 0
BACK PAIN: 0
COUGH: 0
SHORTNESS OF BREATH: 0
DIARRHEA: 0
VOMITING: 0
ABDOMINAL PAIN: 0

## 2023-12-07 ASSESSMENT — PAIN - FUNCTIONAL ASSESSMENT
PAIN_FUNCTIONAL_ASSESSMENT: PREVENTS OR INTERFERES SOME ACTIVE ACTIVITIES AND ADLS
PAIN_FUNCTIONAL_ASSESSMENT: PREVENTS OR INTERFERES WITH ALL ACTIVE AND SOME PASSIVE ACTIVITIES

## 2023-12-07 NOTE — CARE COORDINATION
Met with patient about diagnosis and discharge plan of care. Pt admit for left femur fracture after mechanical fall at home. Kumar's traction. Pt for OR tomorrow. Pt lives alone in 2nd floor apt, elevator. Pt has wheeled walker, 3-1 commode and extended tub bench. PCP is Dr Raymond Arthur. Hx of [facility]. Pt wants home. Will need to see how she does with therapies. Referral called to Krista at [facility].  Will follow-shasha

## 2023-12-07 NOTE — PLAN OF CARE
Problem: Discharge Planning  Goal: Discharge to home or other facility with appropriate resources  12/7/2023 1333 by Stephanie Morales RN  Outcome: Progressing  12/7/2023 0356 by Heriberto Ling RN  Outcome: Progressing     Problem: Pain  Goal: Verbalizes/displays adequate comfort level or baseline comfort level  12/7/2023 1333 by Stephanie Morales RN  Outcome: Progressing  12/7/2023 0356 by Heriberto Ling RN  Outcome: Progressing     Problem: Skin/Tissue Integrity  Goal: Absence of new skin breakdown  Description: 1. Monitor for areas of redness and/or skin breakdown  2. Assess vascular access sites hourly  3. Every 4-6 hours minimum:  Change oxygen saturation probe site  4. Every 4-6 hours:  If on nasal continuous positive airway pressure, respiratory therapy assess nares and determine need for appliance change or resting period.   12/7/2023 1333 by Stephanie Morales RN  Outcome: Progressing  12/7/2023 0356 by Heriberto Ling RN  Outcome: Progressing     Problem: Safety - Adult  Goal: Free from fall injury  12/7/2023 1333 by Stephanie Morales RN  Outcome: Progressing  12/7/2023 0356 by Heriberto Ling RN  Outcome: Progressing     Problem: ABCDS Injury Assessment  Goal: Absence of physical injury  12/7/2023 1333 by Stephanie Morales RN  Outcome: Progressing  12/7/2023 0356 by Heriberto Ling RN  Outcome: Progressing

## 2023-12-07 NOTE — ACP (ADVANCE CARE PLANNING)
Advance Care Planning   Healthcare Decision Maker:    Primary Decision Maker: Robert Card - Child - [phone]    Click here to complete Healthcare Decision Makers including selection of the Healthcare Decision Maker Relationship (ie \"Primary\").

## 2023-12-07 NOTE — H&P
[address]                              HISTORY AND PHYSICAL    PATIENT NAME: Valentino Heckler                  :        1955  MED REC NO:   [id number]                            ROOM:       5444  ACCOUNT NO:   [de-identified]                           ADMIT DATE: 2023  PROVIDER:     Connie Judge DO    CHIEF COMPLAINT:  Left hip fracture. HISTORY OF PRESENT ILLNESS:  This is a 54-year-old female who presented  to [address] [facility] Emergency Room after having sustained a fall  while at home. The patient states that she tripped falling directly  onto her left hip, immediately was experiencing pain, therefore opted to  come to the emergency room for further evaluation. It should be noted  that this patient has a history of thalamic stroke and has had  subsequent weakness of the left upper and lower extremity with left  footdrop. She has been seen and fitted with orthotic for footdrop;  however, states that this in fact has caused her to trip and prefers not  using it; however, Podiatry continues to insist that she does so. Nonetheless, the patient came to the emergency room, where X-ray was  taken of the left hip and showed left anterior trochanteric fracture. The patient also had x-rays of the knee, foot, and ankle. No fractures  in these areas were noted. CTs of the head and cervical spine were also  performed and showing no acute abnormalities. Baseline laboratory  studies were performed including CMP and CBC. Her white cell count was  elevated at 14,500. Remainder of laboratory studies within normal  range. The patient was ultimately admitted for Orthopedic consultation. She was otherwise denying any chest pain, shortness of breath, abdominal  pain, nausea, vomiting, headaches, or dizziness prior to or subsequently  following her fall.     PAST MEDICAL HISTORY:  Consists of

## 2023-12-07 NOTE — TELEPHONE ENCOUNTER
I called the patient and left a message reminding her of the CT lung screening at [facility] on 12/8/2023 at 6:00 pm with an 5:30 pm arrival.  If unable to keep this appt call the office at [phone] to get rescheduled.           Electronically signed by Denise Brown on 12/7/23 at 1:57 PM EST

## 2023-12-08 ENCOUNTER — ANESTHESIA EVENT (OUTPATIENT)
Dept: OPERATING ROOM | Age: 68
DRG: 481 | End: 2023-12-08
Payer: COMMERCIAL

## 2023-12-08 ENCOUNTER — APPOINTMENT (OUTPATIENT)
Dept: GENERAL RADIOLOGY | Age: 68
DRG: 481 | End: 2023-12-08
Payer: COMMERCIAL

## 2023-12-08 LAB
BASOPHILS # BLD: 0.02 K/UL (ref 0–0.2)
BASOPHILS NFR BLD: 0 % (ref 0–2)
EOSINOPHIL # BLD: 0.07 K/UL (ref 0.05–0.5)
EOSINOPHILS RELATIVE PERCENT: 1 % (ref 0–6)
ERYTHROCYTE [DISTWIDTH] IN BLOOD BY AUTOMATED COUNT: 15.1 % (ref 11.5–15)
HCT VFR BLD AUTO: 36.7 % (ref 34–48)
HGB BLD-MCNC: 12.2 G/DL (ref 11.5–15.5)
IMM GRANULOCYTES # BLD AUTO: <0.03 K/UL (ref 0–0.58)
IMM GRANULOCYTES NFR BLD: 0 % (ref 0–5)
LYMPHOCYTES NFR BLD: 1.77 K/UL (ref 1.5–4)
LYMPHOCYTES RELATIVE PERCENT: 23 % (ref 20–42)
MCH RBC QN AUTO: 32 PG (ref 26–35)
MCHC RBC AUTO-ENTMCNC: 33.2 G/DL (ref 32–34.5)
MCV RBC AUTO: 96.3 FL (ref 80–99.9)
MONOCYTES NFR BLD: 0.72 K/UL (ref 0.1–0.95)
MONOCYTES NFR BLD: 10 % (ref 2–12)
NEUTROPHILS NFR BLD: 66 % (ref 43–80)
NEUTS SEG NFR BLD: 4.98 K/UL (ref 1.8–7.3)
PLATELET # BLD AUTO: 223 K/UL (ref 130–450)
PMV BLD AUTO: 10.5 FL (ref 7–12)
RBC # BLD AUTO: 3.81 M/UL (ref 3.5–5.5)
WBC OTHER # BLD: 7.6 K/UL (ref 4.5–11.5)

## 2023-12-08 PROCEDURE — 6360000002 HC RX W HCPCS: Performed by: GENERAL ACUTE CARE HOSPITAL

## 2023-12-08 PROCEDURE — 2500000003 HC RX 250 WO HCPCS: Performed by: GENERAL ACUTE CARE HOSPITAL

## 2023-12-08 PROCEDURE — 85025 COMPLETE CBC W/AUTO DIFF WBC: CPT

## 2023-12-08 PROCEDURE — 2580000003 HC RX 258: Performed by: FAMILY MEDICINE

## 2023-12-08 PROCEDURE — 7100000000 HC PACU RECOVERY - FIRST 15 MIN: Performed by: GENERAL ACUTE CARE HOSPITAL

## 2023-12-08 PROCEDURE — 87081 CULTURE SCREEN ONLY: CPT

## 2023-12-08 PROCEDURE — 2580000003 HC RX 258: Performed by: GENERAL ACUTE CARE HOSPITAL

## 2023-12-08 PROCEDURE — 3700000000 HC ANESTHESIA ATTENDED CARE: Performed by: GENERAL ACUTE CARE HOSPITAL

## 2023-12-08 PROCEDURE — 3600000004 HC SURGERY LEVEL 4 BASE: Performed by: GENERAL ACUTE CARE HOSPITAL

## 2023-12-08 PROCEDURE — 6370000000 HC RX 637 (ALT 250 FOR IP): Performed by: FAMILY MEDICINE

## 2023-12-08 PROCEDURE — 3700000001 HC ADD 15 MINUTES (ANESTHESIA): Performed by: GENERAL ACUTE CARE HOSPITAL

## 2023-12-08 PROCEDURE — 3600000014 HC SURGERY LEVEL 4 ADDTL 15MIN: Performed by: GENERAL ACUTE CARE HOSPITAL

## 2023-12-08 PROCEDURE — 6360000002 HC RX W HCPCS: Performed by: FAMILY MEDICINE

## 2023-12-08 PROCEDURE — C1769 GUIDE WIRE: HCPCS | Performed by: GENERAL ACUTE CARE HOSPITAL

## 2023-12-08 PROCEDURE — 7100000001 HC PACU RECOVERY - ADDTL 15 MIN: Performed by: GENERAL ACUTE CARE HOSPITAL

## 2023-12-08 PROCEDURE — 2709999900 HC NON-CHARGEABLE SUPPLY: Performed by: GENERAL ACUTE CARE HOSPITAL

## 2023-12-08 PROCEDURE — 94640 AIRWAY INHALATION TREATMENT: CPT

## 2023-12-08 PROCEDURE — 6360000002 HC RX W HCPCS: Performed by: ANESTHESIOLOGY

## 2023-12-08 PROCEDURE — 6360000002 HC RX W HCPCS

## 2023-12-08 PROCEDURE — 2500000003 HC RX 250 WO HCPCS

## 2023-12-08 PROCEDURE — 2720000010 HC SURG SUPPLY STERILE: Performed by: GENERAL ACUTE CARE HOSPITAL

## 2023-12-08 PROCEDURE — C1713 ANCHOR/SCREW BN/BN,TIS/BN: HCPCS | Performed by: GENERAL ACUTE CARE HOSPITAL

## 2023-12-08 PROCEDURE — 0QS704Z REPOSITION LEFT UPPER FEMUR WITH INTERNAL FIXATION DEVICE, OPEN APPROACH: ICD-10-PCS | Performed by: GENERAL ACUTE CARE HOSPITAL

## 2023-12-08 PROCEDURE — 1200000000 HC SEMI PRIVATE

## 2023-12-08 DEVICE — TFNA FENESTRATED SCREW 90MM - STERILE
Type: IMPLANTABLE DEVICE | Site: HIP | Status: FUNCTIONAL
Brand: TFN-ADVANCE

## 2023-12-08 DEVICE — 11MM/125 DEG TI CANN TFNA 170MM - STERILE
Type: IMPLANTABLE DEVICE | Site: HIP | Status: FUNCTIONAL
Brand: TFN-ADVANCE

## 2023-12-08 DEVICE — SCREW BNE L38MM DIA5MM TIB LT GRN TI ST CANN LOK FULL THRD: Type: IMPLANTABLE DEVICE | Site: HIP | Status: FUNCTIONAL

## 2023-12-08 RX ORDER — SODIUM CHLORIDE 0.9 % (FLUSH) 0.9 %
5-40 SYRINGE (ML) INJECTION PRN
Status: DISCONTINUED | OUTPATIENT
Start: 2023-12-08 | End: 2023-12-11 | Stop reason: HOSPADM

## 2023-12-08 RX ORDER — FENTANYL CITRATE 50 UG/ML
INJECTION, SOLUTION INTRAMUSCULAR; INTRAVENOUS PRN
Status: DISCONTINUED | OUTPATIENT
Start: 2023-12-08 | End: 2023-12-08 | Stop reason: SDUPTHER

## 2023-12-08 RX ORDER — BUPIVACAINE HYDROCHLORIDE 5 MG/ML
INJECTION, SOLUTION EPIDURAL; INTRACAUDAL PRN
Status: DISCONTINUED | OUTPATIENT
Start: 2023-12-08 | End: 2023-12-08 | Stop reason: ALTCHOICE

## 2023-12-08 RX ORDER — ONDANSETRON 2 MG/ML
4 INJECTION INTRAMUSCULAR; INTRAVENOUS EVERY 6 HOURS PRN
Status: DISCONTINUED | OUTPATIENT
Start: 2023-12-08 | End: 2023-12-11 | Stop reason: HOSPADM

## 2023-12-08 RX ORDER — LIDOCAINE HYDROCHLORIDE 20 MG/ML
INJECTION, SOLUTION EPIDURAL; INFILTRATION; INTRACAUDAL; PERINEURAL PRN
Status: DISCONTINUED | OUTPATIENT
Start: 2023-12-08 | End: 2023-12-08 | Stop reason: SDUPTHER

## 2023-12-08 RX ORDER — ONDANSETRON 4 MG/1
4 TABLET, ORALLY DISINTEGRATING ORAL EVERY 8 HOURS PRN
Status: DISCONTINUED | OUTPATIENT
Start: 2023-12-08 | End: 2023-12-11 | Stop reason: HOSPADM

## 2023-12-08 RX ORDER — KETOROLAC TROMETHAMINE 30 MG/ML
INJECTION, SOLUTION INTRAMUSCULAR; INTRAVENOUS PRN
Status: DISCONTINUED | OUTPATIENT
Start: 2023-12-08 | End: 2023-12-08 | Stop reason: SDUPTHER

## 2023-12-08 RX ORDER — KETAMINE HYDROCHLORIDE 10 MG/ML
INJECTION INTRAMUSCULAR; INTRAVENOUS PRN
Status: DISCONTINUED | OUTPATIENT
Start: 2023-12-08 | End: 2023-12-08 | Stop reason: SDUPTHER

## 2023-12-08 RX ORDER — ROCURONIUM BROMIDE 10 MG/ML
INJECTION, SOLUTION INTRAVENOUS PRN
Status: DISCONTINUED | OUTPATIENT
Start: 2023-12-08 | End: 2023-12-08 | Stop reason: SDUPTHER

## 2023-12-08 RX ORDER — OXYCODONE HYDROCHLORIDE 5 MG/1
5 TABLET ORAL EVERY 4 HOURS PRN
Status: DISCONTINUED | OUTPATIENT
Start: 2023-12-08 | End: 2023-12-11 | Stop reason: HOSPADM

## 2023-12-08 RX ORDER — SODIUM CHLORIDE 0.9 % (FLUSH) 0.9 %
5-40 SYRINGE (ML) INJECTION EVERY 12 HOURS SCHEDULED
Status: DISCONTINUED | OUTPATIENT
Start: 2023-12-08 | End: 2023-12-08 | Stop reason: HOSPADM

## 2023-12-08 RX ORDER — MORPHINE SULFATE 4 MG/ML
4 INJECTION, SOLUTION INTRAMUSCULAR; INTRAVENOUS
Status: DISCONTINUED | OUTPATIENT
Start: 2023-12-08 | End: 2023-12-11 | Stop reason: HOSPADM

## 2023-12-08 RX ORDER — MAGNESIUM SULFATE IN WATER 40 MG/ML
2000 INJECTION, SOLUTION INTRAVENOUS ONCE
Status: COMPLETED | OUTPATIENT
Start: 2023-12-08 | End: 2023-12-08

## 2023-12-08 RX ORDER — DIPHENHYDRAMINE HYDROCHLORIDE 50 MG/ML
12.5 INJECTION INTRAMUSCULAR; INTRAVENOUS
Status: DISCONTINUED | OUTPATIENT
Start: 2023-12-08 | End: 2023-12-08 | Stop reason: HOSPADM

## 2023-12-08 RX ORDER — MORPHINE SULFATE 2 MG/ML
2 INJECTION, SOLUTION INTRAMUSCULAR; INTRAVENOUS
Status: DISCONTINUED | OUTPATIENT
Start: 2023-12-08 | End: 2023-12-11 | Stop reason: HOSPADM

## 2023-12-08 RX ORDER — SODIUM CHLORIDE 0.9 % (FLUSH) 0.9 %
5-40 SYRINGE (ML) INJECTION PRN
Status: DISCONTINUED | OUTPATIENT
Start: 2023-12-08 | End: 2023-12-08 | Stop reason: HOSPADM

## 2023-12-08 RX ORDER — SODIUM CHLORIDE 0.9 % (FLUSH) 0.9 %
5-40 SYRINGE (ML) INJECTION EVERY 12 HOURS SCHEDULED
Status: DISCONTINUED | OUTPATIENT
Start: 2023-12-08 | End: 2023-12-11 | Stop reason: HOSPADM

## 2023-12-08 RX ORDER — SODIUM CHLORIDE 9 MG/ML
INJECTION, SOLUTION INTRAVENOUS PRN
Status: DISCONTINUED | OUTPATIENT
Start: 2023-12-08 | End: 2023-12-08 | Stop reason: HOSPADM

## 2023-12-08 RX ORDER — OXYCODONE HYDROCHLORIDE 5 MG/1
10 TABLET ORAL EVERY 4 HOURS PRN
Status: DISCONTINUED | OUTPATIENT
Start: 2023-12-08 | End: 2023-12-11 | Stop reason: HOSPADM

## 2023-12-08 RX ORDER — PROPOFOL 10 MG/ML
INJECTION, EMULSION INTRAVENOUS PRN
Status: DISCONTINUED | OUTPATIENT
Start: 2023-12-08 | End: 2023-12-08 | Stop reason: SDUPTHER

## 2023-12-08 RX ORDER — MEPERIDINE HYDROCHLORIDE 25 MG/ML
INJECTION INTRAMUSCULAR; INTRAVENOUS; SUBCUTANEOUS
Status: DISPENSED
Start: 2023-12-08 | End: 2023-12-09

## 2023-12-08 RX ORDER — PHENYLEPHRINE HCL IN 0.9% NACL 1 MG/10 ML
SYRINGE (ML) INTRAVENOUS PRN
Status: DISCONTINUED | OUTPATIENT
Start: 2023-12-08 | End: 2023-12-08 | Stop reason: SDUPTHER

## 2023-12-08 RX ORDER — MEPERIDINE HYDROCHLORIDE 25 MG/ML
12.5 INJECTION INTRAMUSCULAR; INTRAVENOUS; SUBCUTANEOUS EVERY 5 MIN PRN
Status: DISCONTINUED | OUTPATIENT
Start: 2023-12-08 | End: 2023-12-08 | Stop reason: HOSPADM

## 2023-12-08 RX ORDER — DEXAMETHASONE SODIUM PHOSPHATE 4 MG/ML
INJECTION, SOLUTION INTRA-ARTICULAR; INTRALESIONAL; INTRAMUSCULAR; INTRAVENOUS; SOFT TISSUE PRN
Status: DISCONTINUED | OUTPATIENT
Start: 2023-12-08 | End: 2023-12-08 | Stop reason: SDUPTHER

## 2023-12-08 RX ORDER — ONDANSETRON 2 MG/ML
INJECTION INTRAMUSCULAR; INTRAVENOUS PRN
Status: DISCONTINUED | OUTPATIENT
Start: 2023-12-08 | End: 2023-12-08 | Stop reason: SDUPTHER

## 2023-12-08 RX ORDER — POLYETHYLENE GLYCOL 3350 17 G/17G
17 POWDER, FOR SOLUTION ORAL DAILY
Status: DISCONTINUED | OUTPATIENT
Start: 2023-12-08 | End: 2023-12-11 | Stop reason: HOSPADM

## 2023-12-08 RX ORDER — ENOXAPARIN SODIUM 100 MG/ML
30 INJECTION SUBCUTANEOUS DAILY
Status: DISCONTINUED | OUTPATIENT
Start: 2023-12-09 | End: 2023-12-11 | Stop reason: HOSPADM

## 2023-12-08 RX ORDER — SODIUM CHLORIDE 9 MG/ML
INJECTION, SOLUTION INTRAVENOUS PRN
Status: DISCONTINUED | OUTPATIENT
Start: 2023-12-08 | End: 2023-12-11 | Stop reason: HOSPADM

## 2023-12-08 RX ADMIN — TRANEXAMIC ACID 1000 MG: 100 INJECTION, SOLUTION INTRAVENOUS at 14:49

## 2023-12-08 RX ADMIN — PREGABALIN 300 MG: 75 CAPSULE ORAL at 20:24

## 2023-12-08 RX ADMIN — KETAMINE HYDROCHLORIDE 20 MG: 10 INJECTION INTRAMUSCULAR; INTRAVENOUS at 12:04

## 2023-12-08 RX ADMIN — WATER 1000 MG: 1 INJECTION INTRAMUSCULAR; INTRAVENOUS; SUBCUTANEOUS at 07:57

## 2023-12-08 RX ADMIN — SODIUM CHLORIDE: 9 INJECTION, SOLUTION INTRAVENOUS at 11:59

## 2023-12-08 RX ADMIN — PROPOFOL 50 MG: 10 INJECTION, EMULSION INTRAVENOUS at 12:04

## 2023-12-08 RX ADMIN — IPRATROPIUM BROMIDE 0.5 MG: 0.5 SOLUTION RESPIRATORY (INHALATION) at 16:29

## 2023-12-08 RX ADMIN — TRANEXAMIC ACID 1000 MG: 100 INJECTION, SOLUTION INTRAVENOUS at 12:22

## 2023-12-08 RX ADMIN — Medication 100 MCG: at 12:22

## 2023-12-08 RX ADMIN — WATER 2000 MG: 1 INJECTION INTRAMUSCULAR; INTRAVENOUS; SUBCUTANEOUS at 12:14

## 2023-12-08 RX ADMIN — Medication 15 MG: at 13:14

## 2023-12-08 RX ADMIN — ARFORMOTEROL TARTRATE 15 MCG: 15 SOLUTION RESPIRATORY (INHALATION) at 09:55

## 2023-12-08 RX ADMIN — IPRATROPIUM BROMIDE 0.5 MG: 0.5 SOLUTION RESPIRATORY (INHALATION) at 21:28

## 2023-12-08 RX ADMIN — Medication 50 MCG: at 12:40

## 2023-12-08 RX ADMIN — MAGNESIUM SULFATE HEPTAHYDRATE 2000 MG: 2 INJECTION, SOLUTION INTRAVENOUS at 12:23

## 2023-12-08 RX ADMIN — SODIUM CHLORIDE, PRESERVATIVE FREE 10 ML: 5 INJECTION INTRAVENOUS at 20:25

## 2023-12-08 RX ADMIN — MORPHINE SULFATE 2 MG: 2 INJECTION, SOLUTION INTRAMUSCULAR; INTRAVENOUS at 06:06

## 2023-12-08 RX ADMIN — MEPERIDINE HYDROCHLORIDE 12.5 MG: 25 INJECTION, SOLUTION INTRAMUSCULAR; INTRAVENOUS; SUBCUTANEOUS at 13:35

## 2023-12-08 RX ADMIN — SODIUM CHLORIDE, PRESERVATIVE FREE 10 ML: 5 INJECTION INTRAVENOUS at 08:04

## 2023-12-08 RX ADMIN — FENTANYL CITRATE 50 MCG: 50 INJECTION, SOLUTION INTRAMUSCULAR; INTRAVENOUS at 12:04

## 2023-12-08 RX ADMIN — WATER 2000 MG: 1 INJECTION INTRAMUSCULAR; INTRAVENOUS; SUBCUTANEOUS at 23:59

## 2023-12-08 RX ADMIN — ARFORMOTEROL TARTRATE 15 MCG: 15 SOLUTION RESPIRATORY (INHALATION) at 21:27

## 2023-12-08 RX ADMIN — LIDOCAINE HYDROCHLORIDE 60 MG: 20 INJECTION, SOLUTION EPIDURAL; INFILTRATION; INTRACAUDAL; PERINEURAL at 12:04

## 2023-12-08 RX ADMIN — Medication 50 MCG: at 12:32

## 2023-12-08 RX ADMIN — Medication 50 MCG: at 13:06

## 2023-12-08 RX ADMIN — MEPERIDINE HYDROCHLORIDE 12.5 MG: 25 INJECTION, SOLUTION INTRAMUSCULAR; INTRAVENOUS; SUBCUTANEOUS at 13:46

## 2023-12-08 RX ADMIN — IPRATROPIUM BROMIDE 0.5 MG: 0.5 SOLUTION RESPIRATORY (INHALATION) at 09:55

## 2023-12-08 RX ADMIN — ONDANSETRON 4 MG: 2 INJECTION INTRAMUSCULAR; INTRAVENOUS at 13:05

## 2023-12-08 RX ADMIN — SUGAMMADEX 200 MG: 100 INJECTION, SOLUTION INTRAVENOUS at 13:14

## 2023-12-08 RX ADMIN — DEXAMETHASONE SODIUM PHOSPHATE 10 MG: 4 INJECTION, SOLUTION INTRAMUSCULAR; INTRAVENOUS at 12:15

## 2023-12-08 RX ADMIN — ROCURONIUM BROMIDE 30 MG: 10 INJECTION, SOLUTION INTRAVENOUS at 12:04

## 2023-12-08 ASSESSMENT — PAIN DESCRIPTION - ORIENTATION: ORIENTATION: LEFT

## 2023-12-08 ASSESSMENT — PAIN SCALES - GENERAL
PAINLEVEL_OUTOF10: 8
PAINLEVEL_OUTOF10: 0
PAINLEVEL_OUTOF10: 10

## 2023-12-08 ASSESSMENT — PAIN DESCRIPTION - LOCATION: LOCATION: HIP

## 2023-12-08 ASSESSMENT — PAIN - FUNCTIONAL ASSESSMENT: PAIN_FUNCTIONAL_ASSESSMENT: PREVENTS OR INTERFERES WITH ALL ACTIVE AND SOME PASSIVE ACTIVITIES

## 2023-12-08 ASSESSMENT — PAIN DESCRIPTION - DESCRIPTORS: DESCRIPTORS: SHARP

## 2023-12-08 ASSESSMENT — COPD QUESTIONNAIRES: CAT_SEVERITY: MODERATE

## 2023-12-08 ASSESSMENT — PAIN DESCRIPTION - PAIN TYPE: TYPE: ACUTE PAIN

## 2023-12-08 NOTE — CARE COORDINATION
Updated plan of care. Post op left hip ORIF. Pending therapies. If home, [place] [facility] accepted and orders completed. Please notified patriot if discharged over weekend.  Pt has all DME-mjo

## 2023-12-08 NOTE — ADT AUTH CERT
Subscriber Details  Hospital Account [de-identified]  CVG Subscriber Name/Sex/Relation Subscriber  Subscriber Address/Phone Subscriber Emp/Emp Phone   1. Hilario Mcmanus [place]   [id number] DEBORAH BABCOCK - Female   (Self) 1955 [id number] , [facility]   [phone](X) DISABLED      Utilization Reviews       PA INPT LETTER  Last Updated by Tonya Burris RN on 2023     Review Status Created By   In Giovanna Munoz RN       Review Type Associated Date   -- 2023      Criteria Review   SLR Keep in Inpatient status  Name: Kareen Ortiz   : 1955   CSN: [id number]   INSURANCE: [address]    Date of admission: 23    Current status: Inpatient    We recommend that patient's status is APPROPRIATE     Rationale: p/w s/p fall. Ct head and ct c-spine neg. Xray of left hip shows Left intertrochanteric fracture. Ortho consulted. Plan for left hip ORIF. Inpt greer    Clinical summary 76 y.o. female with as above. Vitals vss  Labs and Imaging reviewed    Status decision based on clinical judgment and Commercial Utilization criteria, e.g., MCG, [employer]            Last Updated by Tonya Burris RN on 2023     Review Status Created By   Isidro Munoz RN       Review Type Associated Date   -- 2023      Criteria Review   DATE:         RELEVANT BASELINES:   RA     PERTINENT UPDATES:  Kenedy traction 5 lbs to L ext. Awaiting ortho consult  Plan for left hip ORIF  Marnie Wagner has been initiated per Orthopedics. Patient will need physical and occupational therapy. Morphine and Zofran iv prn for pain and nausea        VITALS:     98.4 (36.9) 17 78 127/65     96% RA        ABNL/PERTINENT LABS/RADIOLOGY/DIAGNOSTIC STUDIES:  Rdw 15.1           MD CONSULTS/ASSESSMENT AND PLAN:    ASSESSMENT:  1. Intertrochanteric fracture of left hip. 2.  Hypothyroidism. 3.  Peripheral neuropathy due to ischemia.      PLAN:  The

## 2023-12-08 NOTE — OP NOTE
2-0 Vicryl suture. Skin margins were approximated using staples. A sterile dressing was placed, anesthesia was reversed and patient was transferred back to his hospital bed.     Electronically signed by Bernard Little DO on 12/8/2023 at 1:17 PM

## 2023-12-08 NOTE — ANESTHESIA PRE PROCEDURE
Department of Anesthesiology  Preprocedure Note       Name:  Roman Young   Age:  76 y.o.  :  1955                                          MRN:  [id number]         Date:  2023      Surgeon: Mellissa Tony):  Liz Johnson DO    Procedure: Procedure(s):  LEFT HIP OPEN REDUCTION INTERNAL FIXATION       ++SYNTHES++           ++ REQ 12PM++    Medications prior to admission:   Prior to Admission medications    Medication Sig Start Date End Date Taking? Authorizing Provider   albuterol sulfate HFA (PROVENTIL;VENTOLIN;PROAIR) 108 (90 Base) MCG/ACT inhaler Inhale 2 puffs into the lungs every 4 hours as needed 10/26/23  Yes ProviderLino MD   diclofenac sodium (VOLTAREN) 1 % GEL Apply 2 g topically 4 times daily as needed 23  Yes ProviderLino MD   clotrimazole-betamethasone (LOTRISONE) 1-0.05 % cream Apply 1 Application topically 2 times daily 23  Yes ProviderLino MD   BREZTRI AEROSPHERE 160-9-4.8 MCG/ACT AERO Inhale 2 puffs into the lungs 2 times daily 23  Yes ProviderLino MD   acetaminophen (TYLENOL) 325 MG tablet Take 2 tablets by mouth in the morning and 2 tablets at noon and 2 tablets in the evening and 2 tablets before bedtime. 22   Leanne Harper MD   aspirin 325 MG EC tablet Take 1 tablet by mouth daily 22   Leanne Harper MD   sennosides-docusate sodium (SENOKOT-S) 8.6-50 MG tablet Take 1 tablet by mouth 2 times daily 22   Leanne Harper MD   pregabalin (LYRICA) 300 MG capsule Take 1 capsule by mouth 2 times daily.     ProviderLino MD   tiotropium-olodaterol (STIOLTO) 2.5-2.5 MCG/ACT AERS Inhale 2 puffs into the lungs daily    Lino Mccord MD   nabumetone (RELAFEN) 750 MG tablet Take 1 tablet by mouth 2 times daily    Lino Mccord MD   vitamin D (ERGOCALCIFEROL) 1.25 MG (49113 UT) CAPS capsule Take 1 capsule by mouth once a week 21   Ashok Ramires DO   levothyroxine (SYNTHROID) 50 MCG tablet Take 1

## 2023-12-09 LAB
BASOPHILS # BLD: 0.01 K/UL (ref 0–0.2)
BASOPHILS NFR BLD: 0 % (ref 0–2)
EOSINOPHIL # BLD: 0 K/UL (ref 0.05–0.5)
EOSINOPHILS RELATIVE PERCENT: 0 % (ref 0–6)
ERYTHROCYTE [DISTWIDTH] IN BLOOD BY AUTOMATED COUNT: 14.6 % (ref 11.5–15)
HCT VFR BLD AUTO: 31.5 % (ref 34–48)
HGB BLD-MCNC: 10.6 G/DL (ref 11.5–15.5)
IMM GRANULOCYTES # BLD AUTO: <0.03 K/UL (ref 0–0.58)
IMM GRANULOCYTES NFR BLD: 0 % (ref 0–5)
LYMPHOCYTES NFR BLD: 0.82 K/UL (ref 1.5–4)
LYMPHOCYTES RELATIVE PERCENT: 9 % (ref 20–42)
MCH RBC QN AUTO: 31.9 PG (ref 26–35)
MCHC RBC AUTO-ENTMCNC: 33.7 G/DL (ref 32–34.5)
MCV RBC AUTO: 94.9 FL (ref 80–99.9)
MICROORGANISM SPEC CULT: NORMAL
MONOCYTES NFR BLD: 0.71 K/UL (ref 0.1–0.95)
MONOCYTES NFR BLD: 8 % (ref 2–12)
NEUTROPHILS NFR BLD: 83 % (ref 43–80)
NEUTS SEG NFR BLD: 7.83 K/UL (ref 1.8–7.3)
PLATELET # BLD AUTO: 205 K/UL (ref 130–450)
PMV BLD AUTO: 10 FL (ref 7–12)
RBC # BLD AUTO: 3.32 M/UL (ref 3.5–5.5)
SPECIMEN DESCRIPTION: NORMAL
WBC OTHER # BLD: 9.4 K/UL (ref 4.5–11.5)

## 2023-12-09 PROCEDURE — 2580000003 HC RX 258: Performed by: FAMILY MEDICINE

## 2023-12-09 PROCEDURE — 6360000002 HC RX W HCPCS: Performed by: FAMILY MEDICINE

## 2023-12-09 PROCEDURE — 36415 COLL VENOUS BLD VENIPUNCTURE: CPT

## 2023-12-09 PROCEDURE — 85025 COMPLETE CBC W/AUTO DIFF WBC: CPT

## 2023-12-09 PROCEDURE — 6360000002 HC RX W HCPCS: Performed by: GENERAL ACUTE CARE HOSPITAL

## 2023-12-09 PROCEDURE — 1200000000 HC SEMI PRIVATE

## 2023-12-09 PROCEDURE — 6370000000 HC RX 637 (ALT 250 FOR IP): Performed by: GENERAL ACUTE CARE HOSPITAL

## 2023-12-09 PROCEDURE — 6370000000 HC RX 637 (ALT 250 FOR IP): Performed by: FAMILY MEDICINE

## 2023-12-09 PROCEDURE — 94640 AIRWAY INHALATION TREATMENT: CPT

## 2023-12-09 PROCEDURE — 2580000003 HC RX 258: Performed by: GENERAL ACUTE CARE HOSPITAL

## 2023-12-09 RX ADMIN — SODIUM CHLORIDE, PRESERVATIVE FREE 10 ML: 5 INJECTION INTRAVENOUS at 10:27

## 2023-12-09 RX ADMIN — OXYCODONE 10 MG: 5 TABLET ORAL at 20:53

## 2023-12-09 RX ADMIN — IPRATROPIUM BROMIDE 0.5 MG: 0.5 SOLUTION RESPIRATORY (INHALATION) at 12:17

## 2023-12-09 RX ADMIN — IPRATROPIUM BROMIDE 0.5 MG: 0.5 SOLUTION RESPIRATORY (INHALATION) at 19:12

## 2023-12-09 RX ADMIN — PREGABALIN 300 MG: 75 CAPSULE ORAL at 10:21

## 2023-12-09 RX ADMIN — ARFORMOTEROL TARTRATE 15 MCG: 15 SOLUTION RESPIRATORY (INHALATION) at 19:12

## 2023-12-09 RX ADMIN — WATER 1000 MG: 1 INJECTION INTRAMUSCULAR; INTRAVENOUS; SUBCUTANEOUS at 10:24

## 2023-12-09 RX ADMIN — LEVOTHYROXINE SODIUM 50 MCG: 0.05 TABLET ORAL at 05:44

## 2023-12-09 RX ADMIN — OXYCODONE 5 MG: 5 TABLET ORAL at 00:10

## 2023-12-09 RX ADMIN — ARFORMOTEROL TARTRATE 15 MCG: 15 SOLUTION RESPIRATORY (INHALATION) at 09:02

## 2023-12-09 RX ADMIN — PREGABALIN 300 MG: 75 CAPSULE ORAL at 20:52

## 2023-12-09 RX ADMIN — POLYETHYLENE GLYCOL 3350 17 G: 17 POWDER, FOR SOLUTION ORAL at 10:27

## 2023-12-09 RX ADMIN — CETIRIZINE HYDROCHLORIDE 10 MG: 10 TABLET, FILM COATED ORAL at 10:21

## 2023-12-09 RX ADMIN — OXYCODONE 10 MG: 5 TABLET ORAL at 06:55

## 2023-12-09 RX ADMIN — IPRATROPIUM BROMIDE 0.5 MG: 0.5 SOLUTION RESPIRATORY (INHALATION) at 09:02

## 2023-12-09 RX ADMIN — ASPIRIN 325 MG: 325 TABLET, COATED ORAL at 10:20

## 2023-12-09 RX ADMIN — SODIUM CHLORIDE, PRESERVATIVE FREE 10 ML: 5 INJECTION INTRAVENOUS at 20:53

## 2023-12-09 RX ADMIN — WATER 2000 MG: 1 INJECTION INTRAMUSCULAR; INTRAVENOUS; SUBCUTANEOUS at 10:35

## 2023-12-09 ASSESSMENT — PAIN SCALES - GENERAL
PAINLEVEL_OUTOF10: 0
PAINLEVEL_OUTOF10: 4
PAINLEVEL_OUTOF10: 10
PAINLEVEL_OUTOF10: 0
PAINLEVEL_OUTOF10: 7
PAINLEVEL_OUTOF10: 5

## 2023-12-09 ASSESSMENT — PAIN DESCRIPTION - ORIENTATION
ORIENTATION: LEFT

## 2023-12-09 ASSESSMENT — PAIN DESCRIPTION - LOCATION
LOCATION: HIP

## 2023-12-09 ASSESSMENT — PAIN DESCRIPTION - FREQUENCY: FREQUENCY: INTERMITTENT

## 2023-12-09 ASSESSMENT — PAIN DESCRIPTION - DESCRIPTORS
DESCRIPTORS: ACHING;DISCOMFORT;SORE
DESCRIPTORS: ACHING;DISCOMFORT;SORE
DESCRIPTORS: ACHING;SORE

## 2023-12-09 ASSESSMENT — PAIN DESCRIPTION - PAIN TYPE: TYPE: SURGICAL PAIN;ACUTE PAIN

## 2023-12-09 ASSESSMENT — PAIN - FUNCTIONAL ASSESSMENT
PAIN_FUNCTIONAL_ASSESSMENT: PREVENTS OR INTERFERES SOME ACTIVE ACTIVITIES AND ADLS
PAIN_FUNCTIONAL_ASSESSMENT: PREVENTS OR INTERFERES SOME ACTIVE ACTIVITIES AND ADLS

## 2023-12-09 ASSESSMENT — PAIN DESCRIPTION - ONSET: ONSET: GRADUAL

## 2023-12-09 NOTE — PLAN OF CARE
[address] [facility] PHYSICAL THERAPY  [address] [facility]  [place] [address]  Dept: [address]: [phone]    PHYSICAL THERAPY PLAN OF CARE: INITIAL EVALUATION    Patient: Naty Sung (77 y.o. female)   Examination Date:   Plan of Care Certification Period: 2023 to  24      :  1955  MRN: [id number]  CSN: [id number]   Insurance: Payor: Cat Mcmillan / Plan: Veda Crockett / Product Type: *No Product type* /   Insurance ID: [id number] - (Medicare Managed) Secondary Insurance (if applicable):    Referring Physician: Jack Agee DO     PCP: Jack Agee DO Visits to Date/Visits Approved:   /      No Show/Cancelled Appts:   /       Medical Diagnosis: No admission diagnoses are documented for this encounter. Impoaired Mobility Deficits of strength Fall Risk  No data recorded     SUBJECTIVE EXAMINATION      History obtained from[de-identified] Chart Review, Patient,      Family/Caregiver Present: No     Subjective History: Onset Date: 23  Subjective: Complaints include LE weakness/lack of coordnation with ambulation Fear of/Risk of falling. Limited control of the LE's Left shoulder pain  Additional Pertinent Hx (if applicable): Patient presents to PT per prescription of Dr Konstantin Paul. Dequan presents with a complex medical hx that includes a CVA, left side affected, and a right JENNIFER. Patient reports her JENNIFER was in Dec 2022. Patient reports a more recent oonset of decreased strength in the LE's with impaired mobiltiy and risks of falling.  Patient  also reports a recent finding of multi level cervical stenosis   Prior diagnostic testing[de-identified] MRI, CT Scan  Previous treatments prior to current episode?: Home PT     ASSESSMENT      Impression:       Body Structures, Functions, Activity Limitations Requiring Skilled Therapeutic Intervention: Decreased functional mobility , Decreased ROM, Decreased strength, Decreased endurance, Decreased balance, Decreased

## 2023-12-10 LAB
MICROORGANISM SPEC CULT: ABNORMAL
SPECIMEN DESCRIPTION: ABNORMAL

## 2023-12-10 PROCEDURE — 97530 THERAPEUTIC ACTIVITIES: CPT

## 2023-12-10 PROCEDURE — 94640 AIRWAY INHALATION TREATMENT: CPT

## 2023-12-10 PROCEDURE — 97161 PT EVAL LOW COMPLEX 20 MIN: CPT

## 2023-12-10 PROCEDURE — 6370000000 HC RX 637 (ALT 250 FOR IP): Performed by: FAMILY MEDICINE

## 2023-12-10 PROCEDURE — 6360000002 HC RX W HCPCS: Performed by: FAMILY MEDICINE

## 2023-12-10 PROCEDURE — 1200000000 HC SEMI PRIVATE

## 2023-12-10 PROCEDURE — 2580000003 HC RX 258: Performed by: FAMILY MEDICINE

## 2023-12-10 PROCEDURE — 6370000000 HC RX 637 (ALT 250 FOR IP): Performed by: GENERAL ACUTE CARE HOSPITAL

## 2023-12-10 PROCEDURE — 2580000003 HC RX 258: Performed by: GENERAL ACUTE CARE HOSPITAL

## 2023-12-10 RX ORDER — SULFAMETHOXAZOLE AND TRIMETHOPRIM 800; 160 MG/1; MG/1
1 TABLET ORAL EVERY 12 HOURS SCHEDULED
Status: DISCONTINUED | OUTPATIENT
Start: 2023-12-10 | End: 2023-12-11 | Stop reason: HOSPADM

## 2023-12-10 RX ADMIN — ACETAMINOPHEN 650 MG: 325 TABLET ORAL at 09:17

## 2023-12-10 RX ADMIN — OXYCODONE 5 MG: 5 TABLET ORAL at 06:10

## 2023-12-10 RX ADMIN — WATER 1000 MG: 1 INJECTION INTRAMUSCULAR; INTRAVENOUS; SUBCUTANEOUS at 09:20

## 2023-12-10 RX ADMIN — IPRATROPIUM BROMIDE 0.5 MG: 0.5 SOLUTION RESPIRATORY (INHALATION) at 07:55

## 2023-12-10 RX ADMIN — IPRATROPIUM BROMIDE 0.5 MG: 0.5 SOLUTION RESPIRATORY (INHALATION) at 16:43

## 2023-12-10 RX ADMIN — SODIUM CHLORIDE, PRESERVATIVE FREE 10 ML: 5 INJECTION INTRAVENOUS at 09:22

## 2023-12-10 RX ADMIN — POLYETHYLENE GLYCOL 3350 17 G: 17 POWDER, FOR SOLUTION ORAL at 09:19

## 2023-12-10 RX ADMIN — SODIUM CHLORIDE, PRESERVATIVE FREE 10 ML: 5 INJECTION INTRAVENOUS at 20:06

## 2023-12-10 RX ADMIN — PREGABALIN 300 MG: 75 CAPSULE ORAL at 09:18

## 2023-12-10 RX ADMIN — ASPIRIN 325 MG: 325 TABLET, COATED ORAL at 09:18

## 2023-12-10 RX ADMIN — SULFAMETHOXAZOLE AND TRIMETHOPRIM 1 TABLET: 800; 160 TABLET ORAL at 12:02

## 2023-12-10 RX ADMIN — ARFORMOTEROL TARTRATE 15 MCG: 15 SOLUTION RESPIRATORY (INHALATION) at 07:55

## 2023-12-10 RX ADMIN — OXYCODONE 5 MG: 5 TABLET ORAL at 20:06

## 2023-12-10 RX ADMIN — SULFAMETHOXAZOLE AND TRIMETHOPRIM 1 TABLET: 800; 160 TABLET ORAL at 20:07

## 2023-12-10 RX ADMIN — CETIRIZINE HYDROCHLORIDE 10 MG: 10 TABLET, FILM COATED ORAL at 09:18

## 2023-12-10 RX ADMIN — PREGABALIN 300 MG: 75 CAPSULE ORAL at 20:06

## 2023-12-10 RX ADMIN — IPRATROPIUM BROMIDE 0.5 MG: 0.5 SOLUTION RESPIRATORY (INHALATION) at 20:12

## 2023-12-10 RX ADMIN — LEVOTHYROXINE SODIUM 50 MCG: 0.05 TABLET ORAL at 06:10

## 2023-12-10 RX ADMIN — IPRATROPIUM BROMIDE 0.5 MG: 0.5 SOLUTION RESPIRATORY (INHALATION) at 12:38

## 2023-12-10 RX ADMIN — ARFORMOTEROL TARTRATE 15 MCG: 15 SOLUTION RESPIRATORY (INHALATION) at 20:12

## 2023-12-10 ASSESSMENT — PAIN DESCRIPTION - ORIENTATION
ORIENTATION: LEFT

## 2023-12-10 ASSESSMENT — PAIN SCALES - GENERAL
PAINLEVEL_OUTOF10: 2
PAINLEVEL_OUTOF10: 4
PAINLEVEL_OUTOF10: 4
PAINLEVEL_OUTOF10: 1
PAINLEVEL_OUTOF10: 0
PAINLEVEL_OUTOF10: 2

## 2023-12-10 ASSESSMENT — PAIN DESCRIPTION - DESCRIPTORS
DESCRIPTORS: ACHING;DISCOMFORT

## 2023-12-10 ASSESSMENT — PAIN - FUNCTIONAL ASSESSMENT: PAIN_FUNCTIONAL_ASSESSMENT: PREVENTS OR INTERFERES SOME ACTIVE ACTIVITIES AND ADLS

## 2023-12-10 ASSESSMENT — PAIN DESCRIPTION - PAIN TYPE
TYPE: ACUTE PAIN
TYPE: ACUTE PAIN;SURGICAL PAIN

## 2023-12-10 ASSESSMENT — PAIN DESCRIPTION - LOCATION
LOCATION: HIP

## 2023-12-10 ASSESSMENT — PAIN DESCRIPTION - ONSET
ONSET: GRADUAL
ONSET: GRADUAL

## 2023-12-10 ASSESSMENT — PAIN DESCRIPTION - FREQUENCY
FREQUENCY: INTERMITTENT
FREQUENCY: INTERMITTENT

## 2023-12-10 NOTE — PLAN OF CARE
Problem: Pain  Goal: Verbalizes/displays adequate comfort level or baseline comfort level  12/9/2023 2244 by Judd Castanon RN  Outcome: Progressing  12/9/2023 1202 by Adrian Turner RN  Outcome: Progressing     Problem: Skin/Tissue Integrity  Goal: Absence of new skin breakdown  Description: 1. Monitor for areas of redness and/or skin breakdown  2. Assess vascular access sites hourly  3. Every 4-6 hours minimum:  Change oxygen saturation probe site  4. Every 4-6 hours:  If on nasal continuous positive airway pressure, respiratory therapy assess nares and determine need for appliance change or resting period.   12/9/2023 2244 by Judd Castanon RN  Outcome: Progressing  12/9/2023 1202 by Adrian Turner RN  Outcome: Progressing     Problem: Safety - Adult  Goal: Free from fall injury  12/9/2023 2244 by Judd Castanon RN  Outcome: Progressing  12/9/2023 1202 by Adrian Turner RN  Outcome: Progressing     Problem: ABCDS Injury Assessment  Goal: Absence of physical injury  12/9/2023 2244 by Judd Castanon RN  Outcome: Progressing  12/9/2023 1202 by Adrian Turner RN  Outcome: Progressing

## 2023-12-11 VITALS
TEMPERATURE: 97.5 F | SYSTOLIC BLOOD PRESSURE: 125 MMHG | BODY MASS INDEX: 16.55 KG/M2 | OXYGEN SATURATION: 100 % | DIASTOLIC BLOOD PRESSURE: 75 MMHG | HEIGHT: 66 IN | HEART RATE: 91 BPM | WEIGHT: 103 LBS | RESPIRATION RATE: 16 BRPM

## 2023-12-11 LAB
ALBUMIN SERPL-MCNC: 3.4 G/DL (ref 3.5–5.2)
ALP SERPL-CCNC: 63 U/L (ref 35–104)
ALT SERPL-CCNC: 17 U/L (ref 0–32)
ANION GAP SERPL CALCULATED.3IONS-SCNC: 13 MMOL/L (ref 7–16)
AST SERPL-CCNC: 28 U/L (ref 0–31)
BASOPHILS # BLD: 0.03 K/UL (ref 0–0.2)
BASOPHILS NFR BLD: 1 % (ref 0–2)
BILIRUB SERPL-MCNC: 0.3 MG/DL (ref 0–1.2)
BUN SERPL-MCNC: 15 MG/DL (ref 6–23)
CALCIUM SERPL-MCNC: 9.1 MG/DL (ref 8.6–10.2)
CHLORIDE SERPL-SCNC: 104 MMOL/L (ref 98–107)
CO2 SERPL-SCNC: 22 MMOL/L (ref 22–29)
CREAT SERPL-MCNC: 0.8 MG/DL (ref 0.5–1)
EOSINOPHIL # BLD: 0.14 K/UL (ref 0.05–0.5)
EOSINOPHILS RELATIVE PERCENT: 3 % (ref 0–6)
ERYTHROCYTE [DISTWIDTH] IN BLOOD BY AUTOMATED COUNT: 14.6 % (ref 11.5–15)
GFR SERPL CREATININE-BSD FRML MDRD: >60 ML/MIN/1.73M2
GLUCOSE SERPL-MCNC: 90 MG/DL (ref 74–99)
HCT VFR BLD AUTO: 33.6 % (ref 34–48)
HGB BLD-MCNC: 11.1 G/DL (ref 11.5–15.5)
IMM GRANULOCYTES # BLD AUTO: <0.03 K/UL (ref 0–0.58)
IMM GRANULOCYTES NFR BLD: 0 % (ref 0–5)
LYMPHOCYTES NFR BLD: 1.54 K/UL (ref 1.5–4)
LYMPHOCYTES RELATIVE PERCENT: 29 % (ref 20–42)
MCH RBC QN AUTO: 31.9 PG (ref 26–35)
MCHC RBC AUTO-ENTMCNC: 33 G/DL (ref 32–34.5)
MCV RBC AUTO: 96.6 FL (ref 80–99.9)
MONOCYTES NFR BLD: 0.45 K/UL (ref 0.1–0.95)
MONOCYTES NFR BLD: 9 % (ref 2–12)
NEUTROPHILS NFR BLD: 59 % (ref 43–80)
NEUTS SEG NFR BLD: 3.11 K/UL (ref 1.8–7.3)
PLATELET # BLD AUTO: 239 K/UL (ref 130–450)
PMV BLD AUTO: 10.1 FL (ref 7–12)
POTASSIUM SERPL-SCNC: 4.3 MMOL/L (ref 3.5–5)
PROT SERPL-MCNC: 6.2 G/DL (ref 6.4–8.3)
RBC # BLD AUTO: 3.48 M/UL (ref 3.5–5.5)
SODIUM SERPL-SCNC: 139 MMOL/L (ref 132–146)
WBC OTHER # BLD: 5.3 K/UL (ref 4.5–11.5)

## 2023-12-11 PROCEDURE — 85025 COMPLETE CBC W/AUTO DIFF WBC: CPT

## 2023-12-11 PROCEDURE — 6370000000 HC RX 637 (ALT 250 FOR IP): Performed by: FAMILY MEDICINE

## 2023-12-11 PROCEDURE — 97165 OT EVAL LOW COMPLEX 30 MIN: CPT

## 2023-12-11 PROCEDURE — 80053 COMPREHEN METABOLIC PANEL: CPT

## 2023-12-11 PROCEDURE — 97110 THERAPEUTIC EXERCISES: CPT

## 2023-12-11 PROCEDURE — 6360000002 HC RX W HCPCS: Performed by: FAMILY MEDICINE

## 2023-12-11 PROCEDURE — 2580000003 HC RX 258: Performed by: GENERAL ACUTE CARE HOSPITAL

## 2023-12-11 PROCEDURE — 6370000000 HC RX 637 (ALT 250 FOR IP): Performed by: GENERAL ACUTE CARE HOSPITAL

## 2023-12-11 PROCEDURE — 97530 THERAPEUTIC ACTIVITIES: CPT

## 2023-12-11 PROCEDURE — 94640 AIRWAY INHALATION TREATMENT: CPT

## 2023-12-11 RX ORDER — SULFAMETHOXAZOLE AND TRIMETHOPRIM 800; 160 MG/1; MG/1
1 TABLET ORAL EVERY 12 HOURS SCHEDULED
Qty: 12 TABLET | Refills: 0 | Status: SHIPPED | OUTPATIENT
Start: 2023-12-11 | End: 2023-12-17

## 2023-12-11 RX ADMIN — ARFORMOTEROL TARTRATE 15 MCG: 15 SOLUTION RESPIRATORY (INHALATION) at 07:53

## 2023-12-11 RX ADMIN — ASPIRIN 325 MG: 325 TABLET, COATED ORAL at 08:58

## 2023-12-11 RX ADMIN — PREGABALIN 300 MG: 75 CAPSULE ORAL at 08:58

## 2023-12-11 RX ADMIN — SODIUM CHLORIDE, PRESERVATIVE FREE 10 ML: 5 INJECTION INTRAVENOUS at 09:05

## 2023-12-11 RX ADMIN — POLYETHYLENE GLYCOL 3350 17 G: 17 POWDER, FOR SOLUTION ORAL at 08:58

## 2023-12-11 RX ADMIN — IPRATROPIUM BROMIDE 0.5 MG: 0.5 SOLUTION RESPIRATORY (INHALATION) at 12:17

## 2023-12-11 RX ADMIN — LEVOTHYROXINE SODIUM 50 MCG: 0.05 TABLET ORAL at 06:02

## 2023-12-11 RX ADMIN — OXYCODONE 5 MG: 5 TABLET ORAL at 06:02

## 2023-12-11 RX ADMIN — SULFAMETHOXAZOLE AND TRIMETHOPRIM 1 TABLET: 800; 160 TABLET ORAL at 09:05

## 2023-12-11 RX ADMIN — IPRATROPIUM BROMIDE 0.5 MG: 0.5 SOLUTION RESPIRATORY (INHALATION) at 15:55

## 2023-12-11 RX ADMIN — CETIRIZINE HYDROCHLORIDE 10 MG: 10 TABLET, FILM COATED ORAL at 08:58

## 2023-12-11 RX ADMIN — IPRATROPIUM BROMIDE 0.5 MG: 0.5 SOLUTION RESPIRATORY (INHALATION) at 07:53

## 2023-12-11 ASSESSMENT — PAIN DESCRIPTION - ONSET: ONSET: GRADUAL

## 2023-12-11 ASSESSMENT — PAIN DESCRIPTION - DESCRIPTORS: DESCRIPTORS: ACHING;DISCOMFORT

## 2023-12-11 ASSESSMENT — PAIN DESCRIPTION - ORIENTATION: ORIENTATION: LEFT

## 2023-12-11 ASSESSMENT — PAIN SCALES - GENERAL
PAINLEVEL_OUTOF10: 4
PAINLEVEL_OUTOF10: 0

## 2023-12-11 ASSESSMENT — PAIN DESCRIPTION - LOCATION: LOCATION: HIP

## 2023-12-11 ASSESSMENT — PAIN DESCRIPTION - FREQUENCY: FREQUENCY: INTERMITTENT

## 2023-12-11 ASSESSMENT — PAIN - FUNCTIONAL ASSESSMENT: PAIN_FUNCTIONAL_ASSESSMENT: PREVENTS OR INTERFERES SOME ACTIVE ACTIVITIES AND ADLS

## 2023-12-11 ASSESSMENT — PAIN DESCRIPTION - PAIN TYPE: TYPE: ACUTE PAIN

## 2023-12-11 NOTE — PLAN OF CARE
Problem: Pain  Goal: Verbalizes/displays adequate comfort level or baseline comfort level  12/10/2023 2118 by Jef Hall RN  Outcome: Progressing  12/10/2023 1710 by Kevin Aggarwal RN  Outcome: Progressing     Problem: Skin/Tissue Integrity  Goal: Absence of new skin breakdown  Description: 1. Monitor for areas of redness and/or skin breakdown  2. Assess vascular access sites hourly  3. Every 4-6 hours minimum:  Change oxygen saturation probe site  4. Every 4-6 hours:  If on nasal continuous positive airway pressure, respiratory therapy assess nares and determine need for appliance change or resting period.   12/10/2023 1710 by Kevin Aggarwal RN  Outcome: Progressing     Problem: Safety - Adult  Goal: Free from fall injury  12/10/2023 2118 by Jef Hall RN  Outcome: Progressing  12/10/2023 1710 by Kevin Aggarwal RN  Outcome: Progressing     Problem: ABCDS Injury Assessment  Goal: Absence of physical injury  12/10/2023 2118 by Jef Hall RN  Outcome: Progressing  12/10/2023 1710 by Kevin Aggarwal RN  Outcome: Progressing

## 2023-12-11 NOTE — ANESTHESIA POSTPROCEDURE EVALUATION
Department of Anesthesiology  Postprocedure Note    Patient: Comfort Briseno  MRN: [id number]  YOB: 1955  Date of evaluation: 12/11/2023      Procedure Summary       Date: 12/08/23 Room / Location: [facility] 04 / [address] [facility] [facility]    Anesthesia Start: 9726 Anesthesia Stop: 1329    Procedure: LEFT HIP OPEN REDUCTION INTERNAL FIXATION (Left: Hip) Diagnosis:       Closed fracture of femur, intertrochanteric, left, initial encounter ([address])      (Closed fracture of femur, intertrochanteric, left, initial encounter ([address]) [X60.030I])    Surgeons: Jf Spaulding DO Responsible Provider: Javy Odom MD    Anesthesia Type: General ASA Status: 3            Anesthesia Type: General    Randall Phase I: Randall Score: 9    Randall Phase II:        Anesthesia Post Evaluation    Patient location during evaluation: PACU  Patient participation: complete - patient participated  Level of consciousness: awake and alert  Airway patency: patent  Nausea & Vomiting: no nausea and no vomiting  Complications: no  Cardiovascular status: hemodynamically stable  Respiratory status: acceptable  Hydration status: euvolemic  Multimodal analgesia pain management approach  Pain management: adequate

## 2023-12-11 NOTE — PLAN OF CARE
Problem: Discharge Planning  Goal: Discharge to home or other facility with appropriate resources  12/11/2023 1414 by Alejandro Fuchs RN  Outcome: Adequate for Discharge  12/11/2023 1414 by Alejandro Fuchs RN  Outcome: Progressing  12/11/2023 1127 by Alejandro Fuchs RN  Outcome: Progressing     Problem: Pain  Goal: Verbalizes/displays adequate comfort level or baseline comfort level  12/11/2023 1414 by Alejandro Fuchs RN  Outcome: Adequate for Discharge  12/11/2023 1414 by Alejandro Fuchs RN  Outcome: Progressing  12/11/2023 1127 by Alejandro Fuchs RN  Outcome: Progressing     Problem: Skin/Tissue Integrity  Goal: Absence of new skin breakdown  Description: 1. Monitor for areas of redness and/or skin breakdown  2. Assess vascular access sites hourly  3. Every 4-6 hours minimum:  Change oxygen saturation probe site  4. Every 4-6 hours:  If on nasal continuous positive airway pressure, respiratory therapy assess nares and determine need for appliance change or resting period.   12/11/2023 1414 by Alejandro Fuchs RN  Outcome: Adequate for Discharge  12/11/2023 1414 by Alejandro Fuchs RN  Outcome: Progressing  12/11/2023 1127 by Alejandro Fuchs RN  Outcome: Progressing     Problem: Safety - Adult  Goal: Free from fall injury  12/11/2023 1414 by Alejandro Fuchs RN  Outcome: Adequate for Discharge  12/11/2023 1414 by Alejandro Fuchs RN  Outcome: Progressing  12/11/2023 1127 by Alejandro Fuchs RN  Outcome: Progressing     Problem: ABCDS Injury Assessment  Goal: Absence of physical injury  12/11/2023 1414 by Alejandro Fuchs RN  Outcome: Adequate for Discharge  12/11/2023 1414 by Alejandro Fuchs RN  Outcome: Progressing  12/11/2023 1127 by Alejandro Fuchs RN  Outcome: Progressing     Problem: Chronic Conditions and Co-morbidities  Goal: Patient's chronic conditions and co-morbidity symptoms are monitored and maintained or improved  12/11/2023 1414 by lAejandro Fuchs RN  Outcome: Adequate for Discharge  12/11/2023 1414 by Alejandro Fuchs RN  Outcome: Progressing  12/11/2023 1127 by Juwan Yeboah

## 2023-12-11 NOTE — CARE COORDINATION
Updated plan of care. POD#3. Plan is home today. Pt has all DME.  Plan home with [facility]-orders completed and notified-shasha

## 2023-12-12 NOTE — DISCHARGE SUMMARY
[address]                  [address] [facility], [address] [place] [address]                               DISCHARGE SUMMARY    PATIENT NAME: Valentino Heckler                  :        1955  MED REC NO:   [id number]                            ROOM:       5693  ACCOUNT NO:   [de-identified]                           ADMIT DATE: 2023  PROVIDER:     Connie Judge DO                  [facility] DATE:    ADMITTING DIAGNOSES:  1. Intertrochanteric fracture of left hip. 2.  Hypothyroidism. 3.  Peripheral neuropathy due to ischemia. CONSULTANTS:  Dr. Andreea Oswald was consulted regarding the patient's left hip  fracture. Unfortunately he was unable to perform ORIF the day following  her admission; however, was able to do so that subsequent day. Surgery  went without complications and the patient did well postoperatively in  terms of pain and increased ability to stand, bear weight, and ambulate. Physical therapy was recommended per Orthopedics. This was initiated  while in the hospital and recommendation was for outpatient physical  therapy including initially home physical therapy. HISTORY AND PHYSICAL FINDINGS:  This is a 70-year-old female who  presented [address] [facility] Emergency Room after having sustained a  fall while at home. The patient states that she tripped, falling  directly onto her left hip and immediately was experiencing pain and  therefore opted to come to the emergency room for evaluation. This  patient has a history of thalamic stroke and has had subsequent weakness  of the left upper and lower extremities with left footdrop since that  time. She has been seen and fitted for an orthotic for footdrop;  however, she does state that she feels it makes her balance worse and  states that without this orthotic, she has had less instability in gait.   She began wearing it again at the recommendation of Podiatry; however,  this was on during the time of

## 2024-02-02 ENCOUNTER — HOSPITAL ENCOUNTER (OUTPATIENT)
Dept: PHYSICAL THERAPY | Age: 69
Setting detail: THERAPIES SERIES
Discharge: HOME | End: 2024-02-02
Payer: COMMERCIAL

## 2024-02-02 PROCEDURE — 97161 PT EVAL LOW COMPLEX 20 MIN: CPT

## 2024-02-02 NOTE — PROGRESS NOTES
Physical Therapy    Physical Therapy: Initial Evaluation    Patient: Clarisa Gunter (68 y.o. female)   Examination Date: 2024  Plan of Care Certification Period: 2024 to 2024       :  1955 ;    Confirmed: Yes MRN: [id number]  CSN: [id number]   Insurance: Payor: [facility] / Plan: [facility] DUAL / Product Type: *No Product type* /   Insurance ID: [id number] - (Medicare Managed) Secondary Insurance (if applicable):    Referring Physician: Shady Sweeney DO     PCP: Samuel Angulo DO Visits to Date/Visits Approved:   /      No Show/Cancelled Appts:   /       Medical Diagnosis: Displaced intertrochanteric fracture of left femur, [820.21], [S72.142A]; Hip joint pain left [719.45], [M25.552]    Treatment Diagnosis:       PERTINENT MEDICAL HISTORY           Medical History:     Past Medical History:   Diagnosis Date    Anxiety     Arthritis     Cerebrovascular accident (CVA) due to occlusion of small artery (HCC) 2020    weakness left side    COPD (chronic obstructive pulmonary disease) ([facility])     Hip pain, right     Numbness and tingling of both legs     Thyroid disease      Surgical History:   Past Surgical History:   Procedure Laterality Date    APPENDECTOMY      [place], [place]    CARPAL TUNNEL RELEASE Bilateral      SECTION      x 3    HIP FRACTURE SURGERY Right 2021    RIGHT HIP CLOSED REDUCTION PERCUTANEOUS PINNING performed by Edouard Fraser MD at [facility] OR    HIP FRACTURE SURGERY Left 2023    LEFT HIP OPEN REDUCTION INTERNAL FIXATION performed by Shady Sweeney DO at [facility] OR    TONSILLECTOMY      TOTAL HIP ARTHROPLASTY Right 2022    RIGHT HIP HARDWARE REMOVAL (ELLY) CONVERSION TO RIGHT HIP QUIRINO ROBOTIC ASSISTED TOTAL ARTHROPLASTY performed by Torey Chen MD at [facility] OR       Medications:   Current Outpatient Medications:     albuterol sulfate HFA (PROVENTIL;VENTOLIN;PROAIR) 108 (90 Base) MCG/ACT

## 2024-02-15 ENCOUNTER — HOSPITAL ENCOUNTER (OUTPATIENT)
Dept: PHYSICAL THERAPY | Age: 69
Setting detail: THERAPIES SERIES
Discharge: HOME | End: 2024-02-15
Payer: COMMERCIAL

## 2024-02-15 NOTE — PROGRESS NOTES
[facility]  Phone: [phone] Fax: [phone]     Physical Therapy  Cancellation/No-show Note  Patient Name:  Clarisa Gunter  :  1955   Date:  2/15/2024    For today's appointment patient:  []  Cancelled  []  Rescheduled appointment  [x]  No-show     Reason given by patient:  []  Patient ill  []  Conflicting appointment  [x]  No transportation    []  Conflict with work  []  No reason given  [x]  Other:     Comments:  Pt called at 3:45 to report not being able to get a ride for her 2:00 appointment. Pt requires 2 business days to secure a ride from [facility]. Pt rescheduled for next Thursday and Friday.    Electronically signed by:    Giuliana Jade PT, DPT  License [id number]

## 2024-02-22 ENCOUNTER — HOSPITAL ENCOUNTER (OUTPATIENT)
Dept: PHYSICAL THERAPY | Age: 69
Setting detail: THERAPIES SERIES
Discharge: HOME | End: 2024-02-22
Payer: COMMERCIAL

## 2024-02-22 PROCEDURE — 97530 THERAPEUTIC ACTIVITIES: CPT

## 2024-02-22 PROCEDURE — 97110 THERAPEUTIC EXERCISES: CPT

## 2024-02-22 NOTE — PROGRESS NOTES
[facility]          Phone: [phone] Fax: [phone]    Physical Therapy Daily Treatment Note  Date:  2024    Physical Therapy: Initial Evaluation    Patient: Clarisa Gunter (68 y.o. female)   Examination Date: 2024  Plan of Care Certification Period: 2024 to 2024    :  1955 ;    Confirmed: Yes MRN: [id number]  CSN: [id number]   Insurance: Payor: [facility] / Plan: [facility] DUAL / Product Type: *No Product type* /   Insurance ID: [id number] - (Medicare Managed) Secondary Insurance (if applicable):    Referring Physician: Shady Sweeney DO     PCP: Samuel nAgulo DO Visits to Date/Visits Approved:   /      No Show/Cancelled Appts:   /       Medical Diagnosis: Displaced intertrochanteric fracture of left femur, [820.21], [S72.142A]; Hip joint pain left [719.45], [M25.552]    Treatment Diagnosis:        Visit# / total visits:  3/17  Pain level: 0/10   Time In:  1400  Time Out:  1500    Subjective:  Pt reports having no hip pain but does report weakness in the left leg. Pt states she is having trouble standing up for any length of time as I am afraid my legs will give out.        OBJECTIVE:     Functional Activities:   Transfers (sit to stand ):  10x from mat table    Gait Testing:   Gait Deviations (firm surface/linoleum): Slow gait speed, forward flexed posture, left leg lag/drag with genu recurvatum   Assistive Device Used: WW  Steps: NT    Strength Testing: NT  ROM: WFL    Exercises:  Exercise/Equipment Resistance/Repetitions Other comments     Bike  7.5 min      Bridges 2x10      Clamshells  10x B      SAQs 2x10 B      SLR 10x B      Sidelying hip ABD 10x RLE, 5x LLE Unable to do more with LLE due to pain     LAQs 2x10 B      STS  10x                                                                                      Other Therapeutic Activities:  Ambulation with WW 40 feet x 2, 100 feet    Home Exercise

## 2024-02-23 ENCOUNTER — HOSPITAL ENCOUNTER (OUTPATIENT)
Dept: PHYSICAL THERAPY | Age: 69
Setting detail: THERAPIES SERIES
Discharge: HOME | End: 2024-02-23
Payer: COMMERCIAL

## 2024-02-23 PROCEDURE — 97530 THERAPEUTIC ACTIVITIES: CPT

## 2024-02-23 PROCEDURE — 97110 THERAPEUTIC EXERCISES: CPT

## 2024-02-23 NOTE — PROGRESS NOTES
[facility]          Phone: [phone] Fax: [phone]    Physical Therapy Daily Treatment Note  Date:  2024    Physical Therapy: Initial Evaluation    Patient: Clarisa Gunter (68 y.o. female)   Examination Date: 2024  Plan of Care Certification Period: 2024 to 2024    :  1955 ;    Confirmed: Yes MRN: [id number]  CSN: [id number]   Insurance: Payor: [facility] / Plan: [facility] DUAL / Product Type: *No Product type* /   Insurance ID: [id number] - (Medicare Managed) Secondary Insurance (if applicable):    Referring Physician: Shady Sweeney DO     PCP: Samuel Angulo DO Visits to Date/Visits Approved:   /      No Show/Cancelled Appts:   /       Medical Diagnosis: Displaced intertrochanteric fracture of left femur, [820.21], [S72.142A]; Hip joint pain left [719.45], [M25.552]    Treatment Diagnosis:        Visit# / total visits:    Pain level: 0/10   Time In:  1345  Time Out:  1400    Subjective:  Pt with no c/o pain, but did have discomfort in the left hip with exercises.        OBJECTIVE:     Functional Activities:   Transfers (sit to stand ):  5x from mat table    Gait Testing:   Gait Deviations (firm surface/linoleum): Slow gait speed, forward flexed posture, left leg lag/drag with genu recurvatum   Assistive Device Used: WW  Steps: NT    Strength Testing: NT  ROM: WFL    Exercises:  Exercise/Equipment Resistance/Repetitions Other comments     Bike  10 min      Bridges 2x10      Clamshells  nt     SAQs nt     SLR 10x B Reports hip pain     Sidelying hip ABD nt     LAQs 2x10 B      STS  5x                                                                                      Other Therapeutic Activities:  Ambulation with  feet x 2, short in clinic distances    Home Exercise Program:  NA    Manual Treatments:  NA    Modalities:  NA    Comments:  Pt with no c/o pain upon arrival but did have some pain

## 2024-02-29 ENCOUNTER — HOSPITAL ENCOUNTER (OUTPATIENT)
Dept: PHYSICAL THERAPY | Age: 69
Setting detail: THERAPIES SERIES
Discharge: HOME | End: 2024-02-29
Payer: COMMERCIAL

## 2024-02-29 NOTE — PROGRESS NOTES
[facility]  Phone: [phone] Fax: [phone]     Physical Therapy  Cancellation/No-show Note  Patient Name:  Clarisa Gunter  :  1955   Date:  2024    For today's appointment patient:  []  Cancelled  []  Rescheduled appointment  [x]  No-show     Reason given by patient:  []  Patient ill  []  Conflicting appointment  []  No transportation    []  Conflict with work  []  No reason given  []  Other:     Comments:      Electronically signed by:    Giuliana Jade PT, DPT  License [id number]

## 2024-03-01 ENCOUNTER — HOSPITAL ENCOUNTER (OUTPATIENT)
Dept: PHYSICAL THERAPY | Age: 69
Setting detail: THERAPIES SERIES
Discharge: HOME | End: 2024-03-01

## 2024-03-01 NOTE — PROGRESS NOTES
[facility]  Phone: [phone] Fax: [phone]     Physical Therapy  Cancellation/No-show Note  Patient Name:  Clarisa Gunter  :  1955   Date:  3/1/2024    For today's appointment patient:  []  Cancelled  []  Rescheduled appointment  [x]  No-show     Reason given by patient:  []  Patient ill  []  Conflicting appointment  []  No transportation    []  Conflict with work  []  No reason given  []  Other:     Comments:      Electronically signed by:    Giuliana Jade PT, DPT  License [id number]

## 2024-03-21 ENCOUNTER — HOSPITAL ENCOUNTER (OUTPATIENT)
Dept: PHYSICAL THERAPY | Age: 69
Setting detail: THERAPIES SERIES
Discharge: HOME | End: 2024-03-21
Payer: COMMERCIAL

## 2024-03-21 PROCEDURE — 97110 THERAPEUTIC EXERCISES: CPT

## 2024-03-21 PROCEDURE — 97530 THERAPEUTIC ACTIVITIES: CPT

## 2024-03-21 NOTE — PROGRESS NOTES
[facility]          Phone: [phone] Fax: [phone]    Physical Therapy Daily Treatment Note  Date:  3/21/2024    Physical Therapy: Initial Evaluation    Patient: Clarisa Gunter (68 y.o. female)   Examination Date: 2024  Plan of Care Certification Period: 2024 to 2024    :  1955 ;    Confirmed: Yes MRN: [id number]  CSN: [id number]   Insurance: Payor: [facility] / Plan: [facility] DUAL / Product Type: *No Product type* /   Insurance ID: [id number] - (Medicare Managed) Secondary Insurance (if applicable):    Referring Physician: Shady Sweeney DO     PCP: Samuel Angulo DO Visits to Date/Visits Approved:   /      No Show/Cancelled Appts:   /       Medical Diagnosis: Displaced intertrochanteric fracture of left femur, [820.21], [S72.142A]; Hip joint pain left [719.45], [M25.552]    Treatment Diagnosis:        Visit# / total visits:    Pain level: 0/10   Time In:  1500  Time Out:  1545    Subjective:  Pt with no c/o pain. Pt did report having a fall recently \"I was cleaning the couch cushions and tripped over a cushion and fell onto the cushions. Pt denied any pain from the fall.        OBJECTIVE:     Functional Activities:   Transfers (sit to stand ):  5x from mat table    Gait Testing:   Gait Deviations (firm surface/linoleum): Slow gait speed, forward flexed posture, left leg lag/drag with genu recurvatum   Assistive Device Used: WW  Steps: NT    Strength Testing: NT  ROM: WFL    Exercises:  Exercise/Equipment Resistance/Repetitions Other comments     Bike  10 min      Bridges 2x10      Clamshells  nt     SAQs nt     SLR 10x B Reports hip pain     Supine hip ABD LLE 10x  Slider used      LAQs 2x10 B      STS  5x                                                                                      Other Therapeutic Activities:  Ambulation with  feet, short in clinic distances    Home Exercise Program:

## 2024-03-26 ENCOUNTER — HOSPITAL ENCOUNTER (OUTPATIENT)
Dept: PHYSICAL THERAPY | Age: 69
Setting detail: THERAPIES SERIES
Discharge: HOME | End: 2024-03-26
Payer: COMMERCIAL

## 2024-03-26 PROCEDURE — 97530 THERAPEUTIC ACTIVITIES: CPT

## 2024-03-26 PROCEDURE — 97110 THERAPEUTIC EXERCISES: CPT

## 2024-03-26 NOTE — PROGRESS NOTES
[facility]          Phone: [phone] Fax: [phone]    Physical Therapy Daily Treatment Note  Date:  3/26/2024    Physical Therapy: Initial Evaluation    Patient: Clarisa Gunter (68 y.o. female)   Examination Date: 2024  Plan of Care Certification Period: 2024 to 2024    :  1955 ;    Confirmed: Yes MRN: [id number]  CSN: [id number]   Insurance: Payor: [facility] / Plan: [facility] DUAL / Product Type: *No Product type* /   Insurance ID: [id number] - (Medicare Managed) Secondary Insurance (if applicable):    Referring Physician: Shady Sweeney DO     PCP: Samuel Angulo DO Visits to Date/Visits Approved:   /      No Show/Cancelled Appts:   /       Medical Diagnosis: Displaced intertrochanteric fracture of left femur, [820.21], [S72.142A]; Hip joint pain left [719.45], [M25.552]    Treatment Diagnosis:        Visit# / total visits:   Pain level: 0/10   Time In:  0800  Time Out:  0900    Subjective:  Pt with no c/o today. Reports no pain in the hip.        OBJECTIVE:     Functional Activities:   Transfers (sit to stand ):  2x10 from mat table    Gait Testing:   Gait Deviations (firm surface/linoleum): Slow gait speed, forward flexed posture, left leg lag/drag with genu recurvatum   Assistive Device Used: WW  Steps: NT    Strength Testing: NT  ROM: WFL    Exercises:  Exercise/Equipment Resistance/Repetitions Other comments     Bike  10 min      Bridges 2x10      Clamshells  2x10 B      SAQs 2# 2x10 B nt     SLR nt     Supine hip ABD LLE nt     LAQs 2# 2x10 B      STS  20x                                                                                      Other Therapeutic Activities:  Ambulation with  feet, short in clinic distances    Home Exercise Program:  NA    Manual Treatments:  NA    Modalities:  NA    Comments:  Pt did very well with exercises today. Pt able to ambulate far distances with cues for

## 2024-03-29 ENCOUNTER — HOSPITAL ENCOUNTER (OUTPATIENT)
Dept: PHYSICAL THERAPY | Age: 69
Setting detail: THERAPIES SERIES
Discharge: HOME | End: 2024-03-29
Payer: COMMERCIAL

## 2024-03-29 PROCEDURE — 97530 THERAPEUTIC ACTIVITIES: CPT

## 2024-03-29 PROCEDURE — 97110 THERAPEUTIC EXERCISES: CPT

## 2024-03-29 NOTE — PROGRESS NOTES
[facility]          Phone: [phone] Fax: [phone]    Physical Therapy Daily Treatment Note  Date:  3/29/2024    Physical Therapy: Initial Evaluation    Patient: Clarisa Gunter (68 y.o. female)   Examination Date: 2024  Plan of Care Certification Period: 2024 to 2024    :  1955 ;    Confirmed: Yes MRN: [id number]  CSN: [id number]   Insurance: Payor: [facility] / Plan: [facility] DUAL / Product Type: *No Product type* /   Insurance ID: [id number] - (Medicare Managed) Secondary Insurance (if applicable):    Referring Physician: Shady Sweeney DO     PCP: Samuel Angulo DO Visits to Date/Visits Approved:   /      No Show/Cancelled Appts:   /       Medical Diagnosis: Displaced intertrochanteric fracture of left femur, [820.21], [S72.142A]; Hip joint pain left [719.45], [M25.552]    Treatment Diagnosis:        Visit# / total visits:   Pain level: 0/10   Time In:  1045  Time Out:  1145    Subjective:  Pt with no c/o today. Reports no pain in the hip.        OBJECTIVE:     Functional Activities:   Transfers (sit to stand ):  20x from low block    Gait Testing:   Gait Deviations (firm surface/linoleum): Slow gait speed, forward flexed posture, left leg lag/drag with genu recurvatum   Assistive Device Used: WW  Steps: NT    Strength Testing: NT  ROM: WFL    Exercises:  Exercise/Equipment Resistance/Repetitions Other comments     Bike  10 min Strap used to maintain LLE in neutral while on the bike     Bridges 20x     Hip ADD with ball 20x      Clamshells  20x B      SAQs nt     SLR nt     Supine hip ABD LLE nt     LAQs 2# 2x10 B      STS  20x      Marching in seated 2# 20x B       Hip ABD in standing  2# 10x B                                                                      Other Therapeutic Activities:  Ambulation with  feet, short in clinic distances    Home Exercise Program:  NA    Manual Treatments:

## 2024-04-02 ENCOUNTER — HOSPITAL ENCOUNTER (OUTPATIENT)
Dept: PHYSICAL THERAPY | Age: 69
Setting detail: THERAPIES SERIES
Discharge: HOME | End: 2024-04-02
Payer: COMMERCIAL

## 2024-04-02 NOTE — PROGRESS NOTES
[facility]  Phone: [phone] Fax: [phone]     Physical Therapy  Cancellation/No-show Note  Patient Name:  Clarisa Gunter  :  1955   Date:  2024    For today's appointment patient:  [x]  Cancelled  []  Rescheduled appointment  []  No-show     Reason given by patient:  []  Patient ill  []  Conflicting appointment  [x]  No transportation    []  Conflict with work  []  No reason given  []  Other:     Comments:  Pt reports her transportation company did not have enough notice and unable to bring her to today's appointment.    Electronically signed by:    Giuliana Jade PT, DPT  License [id number]

## 2024-04-05 ENCOUNTER — HOSPITAL ENCOUNTER (OUTPATIENT)
Dept: PHYSICAL THERAPY | Age: 69
Setting detail: THERAPIES SERIES
Discharge: HOME | End: 2024-04-05
Payer: COMMERCIAL

## 2024-04-05 PROCEDURE — 97110 THERAPEUTIC EXERCISES: CPT

## 2024-04-05 PROCEDURE — 97530 THERAPEUTIC ACTIVITIES: CPT

## 2024-04-05 NOTE — PROGRESS NOTES
[facility]          Phone: [phone] Fax: [phone]    Physical Therapy Daily Treatment Note  Date:  2024    Physical Therapy: Initial Evaluation    Patient: Clarisa Gunter (68 y.o. female)   Examination Date: 2024  Plan of Care Certification Period: 2024 to 2024    :  1955 ;    Confirmed: Yes MRN: [id number]  CSN: [id number]   Insurance: Payor: [facility] / Plan: [facility] DUAL / Product Type: *No Product type* /   Insurance ID: [id number] - (Medicare Managed) Secondary Insurance (if applicable):    Referring Physician: Shady Sweeney DO     PCP: Samuel Angulo DO Visits to Date/Visits Approved:   /      No Show/Cancelled Appts:   /       Medical Diagnosis: Displaced intertrochanteric fracture of left femur, [820.21], [S72.142A]; Hip joint pain left [719.45], [M25.552]    Treatment Diagnosis:        Visit# / total visits: 10/17  Pain level: 0/10   Time In:  1415  Time Out:  1500    Subjective:  Pt with no c/o today. Reports no pain in the hip. States she is feeling stronger        OBJECTIVE:     Functional Activities:   Transfers (sit to stand ):  20x from low block    Gait Testing:   Gait Deviations (firm surface/linoleum): Slow gait speed, forward flexed posture, improved gait mechanics  Assistive Device Used: WW  Steps: NT    Strength Testing: NT  ROM: WFL    Exercises:  Exercise/Equipment Resistance/Repetitions Other comments     Bike  10 min Strap used to maintain LLE in neutral while on the bike     Bridges 20x     Hip ADD with ball 20x      Clamshells       SAQs 2# 2x10 B      SLR nt     Supine hip ABD LLE nt     LAQs 2# 2x10 B      STS  20x      Marching in seated 2# 20x B       Hip ABD in standing  2# 10x B                                                                      Other Therapeutic Activities:  Ambulation with  feet x 2, short in clinic distances    Home Exercise Program:

## 2024-04-12 ENCOUNTER — HOSPITAL ENCOUNTER (OUTPATIENT)
Dept: PHYSICAL THERAPY | Age: 69
Setting detail: THERAPIES SERIES
Discharge: HOME | End: 2024-04-12
Payer: COMMERCIAL

## 2024-04-12 PROCEDURE — 97530 THERAPEUTIC ACTIVITIES: CPT

## 2024-04-12 PROCEDURE — 97110 THERAPEUTIC EXERCISES: CPT

## 2024-04-12 NOTE — PROGRESS NOTES
[facility]          Phone: [phone] Fax: [phone]    Physical Therapy Daily Treatment Note  Date:  2024    Physical Therapy: Initial Evaluation    Patient: Clarisa Gunter (68 y.o. female)   Examination Date: 2024  Plan of Care Certification Period: 2024 to 2024    :  1955 ;    Confirmed: Yes MRN: [id number]  CSN: [id number]   Insurance: Payor: [facility] / Plan: [facility] DUAL / Product Type: *No Product type* /   Insurance ID: [id number] - (Medicare Managed) Secondary Insurance (if applicable):    Referring Physician: Shady Sweeney DO     PCP: Samuel Angulo DO Visits to Date/Visits Approved:   /      No Show/Cancelled Appts:   /       Medical Diagnosis: Displaced intertrochanteric fracture of left femur, [820.21], [S72.142A]; Hip joint pain left [719.45], [M25.552]    Treatment Diagnosis:        Visit# / total visits:   Pain level: 0/10   Time In:  1315  Time Out:  1600    Subjective:  Pt arrived a little late today due to her ride not showing up on time. Pt reports walking around a lot today prior to session. Pt with no c/o hip pain.        OBJECTIVE:     Functional Activities:   Transfers (sit to stand ):  10x from mat table    Gait Testing:   Gait Deviations (firm surface/linoleum): Slow gait speed, forward flexed posture, improved gait mechanics, strong use of walker for balance  Assistive Device Used: WW  Steps: NT    Strength Testing: NT  ROM: WFL    Exercises:  Exercise/Equipment Resistance/Repetitions Other comments     Bike  10 min Strap used to maintain LLE in neutral while on the bike     Bridges 20x     Hip ADD with ball 20x      Clamshells  nt     SAQs nt     SLR nt     Supine hip ABD LLE nt     LAQs 2# 2x10 B      STS  10x      Marching in seated 2# 20x B      Hip ABD in standing  2# 2x10 B       Side stepping in // bars 2# 5x R/L      Ankle DF/PF in seated 20x ea

## 2024-04-18 ENCOUNTER — HOSPITAL ENCOUNTER (OUTPATIENT)
Dept: PHYSICAL THERAPY | Age: 69
Setting detail: THERAPIES SERIES
Discharge: HOME | End: 2024-04-18
Payer: COMMERCIAL

## 2024-04-18 PROCEDURE — 97110 THERAPEUTIC EXERCISES: CPT

## 2024-04-18 PROCEDURE — 97530 THERAPEUTIC ACTIVITIES: CPT

## 2024-04-18 NOTE — PROGRESS NOTES
[facility]          Phone: [phone] Fax: [phone]    Physical Therapy Daily Treatment Note  Date:  2024    Physical Therapy: Initial Evaluation    Patient: Clarisa Gunter (68 y.o. female)   Examination Date: 2024  Plan of Care Certification Period: 2024 to 2024    :  1955 ;    Confirmed: Yes MRN: [id number]  CSN: [id number]   Insurance: Payor: [facility] / Plan: [facility] DUAL / Product Type: *No Product type* /   Insurance ID: [id number] - (Medicare Managed) Secondary Insurance (if applicable):    Referring Physician: Shady Sweeney DO     PCP: Samuel Angulo DO Visits to Date/Visits Approved:   /      No Show/Cancelled Appts:   /       Medical Diagnosis: Displaced intertrochanteric fracture of left femur, [820.21], [S72.142A]; Hip joint pain left [719.45], [M25.552]    Treatment Diagnosis:        Visit# / total visits:   Pain level: 0/10   Time In:  1030  Time Out:  1115    Subjective:  Pt with no c/o pain or discomfort. Pt reports ambulating short distances at home without a walker.        OBJECTIVE:     Functional Activities:   Transfers (sit to stand ):  10x from mat table    Gait Testing:   Gait Deviations (firm surface/linoleum): Slow gait speed, forward flexed posture, improved gait mechanics, strong use of walker for balance  Assistive Device Used: WW  Steps: NT    Strength Testing: NT  ROM: WFL    Exercises:  Exercise/Equipment Resistance/Repetitions Other comments     Bike  10 min Strap used to maintain LLE in neutral while on the bike     Bridges nt    Hip ADD with ball nt     Clamshells  nt     SAQs nt     SLR nt     Supine hip ABD LLE nt     LAQs 2# 2x10 B      STS  10x      Marching in standing  2# 20x B      Hip ABD in standing  2# 2x10 B       Side stepping in // bars      Ankle DF/PF in seated 20x ea                                                       Other Therapeutic Activities:

## 2024-05-02 ENCOUNTER — HOSPITAL ENCOUNTER (OUTPATIENT)
Dept: PHYSICAL THERAPY | Age: 69
Setting detail: THERAPIES SERIES
Discharge: HOME | End: 2024-05-02
Payer: COMMERCIAL

## 2024-05-02 PROCEDURE — 97530 THERAPEUTIC ACTIVITIES: CPT

## 2024-05-02 PROCEDURE — 97110 THERAPEUTIC EXERCISES: CPT

## 2024-05-02 NOTE — PROGRESS NOTES
[facility]          Phone: [phone] Fax: [phone]    Physical Therapy Daily Treatment Note  Date:  2024    Physical Therapy: Initial Evaluation    Patient: Clarisa Gunter (68 y.o. female)   Examination Date: 2024  Plan of Care Certification Period: 2024 to 2024    :  1955 ;    Confirmed: Yes MRN: [id number]  CSN: [id number]   Insurance: Payor: [facility] / Plan: [facility] DUAL / Product Type: *No Product type* /   Insurance ID: [id number] - (Medicare Managed) Secondary Insurance (if applicable):    Referring Physician: Shady Sweeney DO     PCP: Samuel Angulo DO Visits to Date/Visits Approved:   /      No Show/Cancelled Appts:   /       Medical Diagnosis: Displaced intertrochanteric fracture of left femur, [820.21], [S72.142A]; Hip joint pain left [719.45], [M25.552]    Treatment Diagnosis:        Visit# / total visits:   Pain level: 0/10   Time In:  1045  Time Out:  1130    Subjective:  Pt arrives 45 minutes late to appointment and reports her  had issues with the previous client. Pt with no c/o pain or discomfort. Pt reporting her left hand is having trouble grasping at times.        OBJECTIVE:     Functional Activities:   Transfers (sit to stand ):  NT    Gait Testing:   Gait Deviations (firm surface/linoleum): Slow gait speed, forward flexed posture, improved gait mechanics, strong use of walker for balance  Assistive Device Used: WW  Steps: NT    Strength Testing: NT  ROM: WFL    Exercises:  Exercise/Equipment Resistance/Repetitions Other comments     Bike  10 min Strap used to maintain LLE in neutral while on the bike     Bridges nt    Hip ADD with ball nt     Clamshells  nt   Heel raises 2# 15 x B    HS curls 2# 15x B           LAQs 2# 2x10 B      STS  10x nt     Marching in standing  2# 20x B      Hip ABD in standing  2# 2x10 B       Side stepping in // bars      Ankle DF/PF in seated nt  normal...

## 2024-05-03 ENCOUNTER — HOSPITAL ENCOUNTER (OUTPATIENT)
Dept: PHYSICAL THERAPY | Age: 69
Setting detail: THERAPIES SERIES
Discharge: HOME | End: 2024-05-03
Payer: COMMERCIAL

## 2024-05-03 PROCEDURE — 97110 THERAPEUTIC EXERCISES: CPT

## 2024-05-03 PROCEDURE — 97530 THERAPEUTIC ACTIVITIES: CPT

## 2024-05-03 NOTE — PROGRESS NOTES
[facility]          Phone: [phone] Fax: [phone]    Physical Therapy Daily Treatment Note  Date:  5/3/2024    Physical Therapy: Initial Evaluation    Patient: Clarisa Gunter (68 y.o. female)   Examination Date: 2024  Plan of Care Certification Period: 2024 to 2024    :  1955 ;    Confirmed: Yes MRN: [id number]  CSN: [id number]   Insurance: Payor: [facility] / Plan: [facility] DUAL / Product Type: *No Product type* /   Insurance ID: [id number] - (Medicare Managed) Secondary Insurance (if applicable):    Referring Physician: Shady Sweeney DO     PCP: Samuel Angulo DO Visits to Date/Visits Approved:   /      No Show/Cancelled Appts:   /       Medical Diagnosis: Displaced intertrochanteric fracture of left femur, [820.21], [S72.142A]; Hip joint pain left [719.45], [M25.552]    Treatment Diagnosis:        Visit# / total visits:   Pain level: 0/10   Time In:  1000  Time Out:  1105    Subjective:  Pt reports being really tired after yesterday's session, and my legs are tired today.        OBJECTIVE:     Functional Activities:   Transfers (sit to stand ):  20x from low blocks    Gait Testing:   Gait Deviations (firm surface/linoleum): Slow gait speed, forward flexed posture, improved gait mechanics, strong use of walker for balance  Assistive Device Used: WW  Steps: 12 steps with 2 rails in reciprocal manner    Strength Testing: NT  ROM: WFL    Exercises:  Exercise/Equipment Resistance/Repetitions Other comments     Bike  10 min Strap used to maintain LLE in neutral while on the bike       Heel raises 2# 15 x B    HS curls 2# 15x B           LAQs 2# 2x10 B      STS  20x      Marching in standing  2# 20x B      Hip ABD in standing  2# 2x10 B       Side stepping in // bars      Ankle DF/PF in seated 20x ea Left foot fatigued after 10 reps                                                      Other Therapeutic

## 2024-05-07 ENCOUNTER — HOSPITAL ENCOUNTER (OUTPATIENT)
Dept: PHYSICAL THERAPY | Age: 69
Setting detail: THERAPIES SERIES
Discharge: HOME | End: 2024-05-07
Payer: COMMERCIAL

## 2024-05-07 NOTE — PROGRESS NOTES
[facility]  Phone: [phone] Fax: [phone]     Physical Therapy  Cancellation/No-show Note  Patient Name:  Clarisa Gunter  :  1955   Date:  2024    For today's appointment patient:  [x]  Cancelled  []  Rescheduled appointment  []  No-show     Reason given by patient:  []  Patient ill  []  Conflicting appointment  []  No transportation    []  Conflict with work  []  No reason given  [x]  Other:     Comments:  Pt reports having issues with her bank account and needs to cancel PT to handle situation.    Electronically signed by:    Giuliana Jade PT, DPT  License [id number]

## 2024-05-10 ENCOUNTER — HOSPITAL ENCOUNTER (OUTPATIENT)
Dept: PHYSICAL THERAPY | Age: 69
Setting detail: THERAPIES SERIES
Discharge: HOME | End: 2024-05-10
Payer: COMMERCIAL

## 2024-05-10 NOTE — PROGRESS NOTES
[facility]  Phone: [phone] Fax: [phone]     Physical Therapy  Cancellation/No-show Note  Patient Name:  Clarisa Gunter  :  1955   Date:  5/10/2024    For today's appointment patient:  []  Cancelled  []  Rescheduled appointment  [x]  No-show     Reason given by patient:  []  Patient ill  []  Conflicting appointment  []  No transportation    []  Conflict with work  []  No reason given  []  Other:     Comments:      Electronically signed by:    Giuliana Jade PT, DPT  License [id number]

## 2024-06-06 ENCOUNTER — HOSPITAL ENCOUNTER (OUTPATIENT)
Dept: PHYSICAL THERAPY | Age: 69
Setting detail: THERAPIES SERIES
Discharge: HOME | End: 2024-06-06

## 2024-06-06 NOTE — PROGRESS NOTES
[facility]  Phone: [phone] Fax: [phone]     Physical Therapy  Cancellation/No-show Note  Patient Name:  Clarisa Gunter  :  1955   Date:  2024    For today's appointment patient:  [x]  Cancelled  []  Rescheduled appointment  []  No-show     Reason given by patient:  []  Patient ill  []  Conflicting appointment  []  No transportation    []  Conflict with work  [x]  No reason given  []  Other:     Comments:      Electronically signed by:    Giuliana Jade PT, DPT  License [id number]

## 2024-06-07 ENCOUNTER — HOSPITAL ENCOUNTER (OUTPATIENT)
Dept: PHYSICAL THERAPY | Age: 69
Setting detail: THERAPIES SERIES
Discharge: HOME | End: 2024-06-07

## 2024-06-07 NOTE — PROGRESS NOTES
[facility]  Phone: [phone] Fax: [phone]     Physical Therapy  Cancellation/No-show Note  Patient Name:  Clarisa Gunter  :  1955   Date:  2024    For today's appointment patient:  []  Cancelled  []  Rescheduled appointment  [x]  No-show     Reason given by patient:  []  Patient ill  []  Conflicting appointment  []  No transportation    []  Conflict with work  []  No reason given  []  Other:     Comments:      Electronically signed by:    Giuliana Jade PT, DPT  License [id number]

## 2024-06-25 ENCOUNTER — EVALUATION (OUTPATIENT)
Dept: PHYSICAL THERAPY | Age: 69
End: 2024-06-25
Payer: COMMERCIAL

## 2024-06-25 DIAGNOSIS — I63.9 RIGHT-SIDED CEREBROVASCULAR ACCIDENT (CVA) (HCC): Primary | ICD-10-CM

## 2024-06-25 DIAGNOSIS — Z96.642 PRESENCE OF LEFT ARTIFICIAL HIP JOINT: ICD-10-CM

## 2024-06-25 DIAGNOSIS — M21.372 LEFT FOOT DROP: ICD-10-CM

## 2024-06-25 DIAGNOSIS — R26.81 GAIT INSTABILITY: ICD-10-CM

## 2024-06-25 PROCEDURE — 97161 PT EVAL LOW COMPLEX 20 MIN: CPT | Performed by: PHYSICAL THERAPIST

## 2024-06-25 NOTE — PROGRESS NOTES
CONVERSION TO RIGHT HIP QUIRINO ROBOTIC ASSISTED TOTAL ARTHROPLASTY performed by Torey Chen MD at [facility] OR       Medications:   Current Outpatient Medications   Medication Sig Dispense Refill    albuterol sulfate HFA (PROVENTIL;VENTOLIN;PROAIR) 108 (90 Base) MCG/ACT inhaler Inhale 2 puffs into the lungs every 4 hours as needed      diclofenac sodium (VOLTAREN) 1 % GEL Apply 2 g topically 4 times daily as needed      clotrimazole-betamethasone (LOTRISONE) 1-0.05 % cream Apply 1 Application topically 2 times daily      BREZTRI AEROSPHERE 160-9-4.8 MCG/ACT AERO Inhale 2 puffs into the lungs 2 times daily      acetaminophen (TYLENOL) 325 MG tablet Take 2 tablets by mouth in the morning and 2 tablets at noon and 2 tablets in the evening and 2 tablets before bedtime. 120 tablet 3    aspirin 325 MG EC tablet Take 1 tablet by mouth daily 42 tablet 0    sennosides-docusate sodium (SENOKOT-S) 8.6-50 MG tablet Take 1 tablet by mouth 2 times daily 30 tablet 0    pregabalin (LYRICA) 300 MG capsule Take 1 capsule by mouth 2 times daily.      vitamin D (ERGOCALCIFEROL) 1.25 MG (64815 UT) CAPS capsule Take 1 capsule by mouth once a week 6 capsule 0    levothyroxine (SYNTHROID) 50 MCG tablet Take 1 tablet by mouth Daily      levocetirizine (XYZAL) 5 MG tablet Take 1 tablet by mouth nightly       No current facility-administered medications for this visit.       Social history: Patient lives alone in a apartment with Ramp to enter, elevator to apartment.     Equipment owned: wheelchair, shower chair, grab bars, elevated toilet seat, wheeled walker    Reported limitations: difficulty with walking, limited transfer mobility, walking limitation < 20 feet, falls     Occupation: disability.    Exercise regimen: none    Hobbies: swimming, horseback riding, sailing    Patient Goals:  independent and safe with use of power chair, to be able to go to the park, to be able to care for apartment and cook with less

## 2024-07-01 ENCOUNTER — HOSPITAL ENCOUNTER (OUTPATIENT)
Dept: PHYSICAL THERAPY | Age: 69
Setting detail: THERAPIES SERIES
Discharge: HOME | End: 2024-07-01

## 2024-07-01 NOTE — PROGRESS NOTES
[facility]  Phone: [phone] Fax: [phone]     Physical Therapy  Cancellation/No-show Note  Patient Name:  Clarisa Gunter  :  1955   Date:  2024    For today's appointment patient:  [x]  Cancelled  []  Rescheduled appointment  []  No-show     Reason given by patient:  []  Patient ill  []  Conflicting appointment  []  No transportation    []  Conflict with work  []  No reason given  [x]  Other:     Comments:  Pt left voicemail to cancel due to a death in the family.    Electronically signed by:    Giuliana Jade PT, DPT  License [id number]

## 2024-09-22 ENCOUNTER — APPOINTMENT (OUTPATIENT)
Dept: CT IMAGING | Age: 69
DRG: 641 | End: 2024-09-22
Attending: STUDENT IN AN ORGANIZED HEALTH CARE EDUCATION/TRAINING PROGRAM
Payer: MEDICARE

## 2024-09-22 ENCOUNTER — APPOINTMENT (OUTPATIENT)
Dept: GENERAL RADIOLOGY | Age: 69
DRG: 641 | End: 2024-09-22
Payer: MEDICARE

## 2024-09-22 ENCOUNTER — HOSPITAL ENCOUNTER (INPATIENT)
Age: 69
LOS: 1 days | Discharge: HOME | DRG: 641 | End: 2024-09-24
Attending: STUDENT IN AN ORGANIZED HEALTH CARE EDUCATION/TRAINING PROGRAM | Admitting: INTERNAL MEDICINE
Payer: MEDICARE

## 2024-09-22 DIAGNOSIS — E87.6 HYPOKALEMIA: ICD-10-CM

## 2024-09-22 DIAGNOSIS — R11.2 NAUSEA AND VOMITING, UNSPECIFIED VOMITING TYPE: ICD-10-CM

## 2024-09-22 DIAGNOSIS — I10 ESSENTIAL HYPERTENSION: ICD-10-CM

## 2024-09-22 DIAGNOSIS — K52.9 ENTERITIS: ICD-10-CM

## 2024-09-22 DIAGNOSIS — K59.00 CONSTIPATION, UNSPECIFIED CONSTIPATION TYPE: ICD-10-CM

## 2024-09-22 DIAGNOSIS — G45.9 TIA (TRANSIENT ISCHEMIC ATTACK): Primary | ICD-10-CM

## 2024-09-22 LAB
ALBUMIN SERPL-MCNC: 4.5 G/DL (ref 3.5–5.2)
ALP SERPL-CCNC: 93 U/L (ref 35–104)
ALT SERPL-CCNC: 16 U/L (ref 0–32)
ANION GAP SERPL CALCULATED.3IONS-SCNC: 12 MMOL/L (ref 7–16)
AST SERPL-CCNC: 23 U/L (ref 0–31)
BACTERIA URNS QL MICRO: ABNORMAL
BASOPHILS # BLD: 0.04 K/UL (ref 0–0.2)
BASOPHILS NFR BLD: 1 % (ref 0–2)
BILIRUB SERPL-MCNC: 0.3 MG/DL (ref 0–1.2)
BILIRUB UR QL STRIP: ABNORMAL
BUN SERPL-MCNC: 11 MG/DL (ref 6–23)
CALCIUM SERPL-MCNC: 9.5 MG/DL (ref 8.6–10.2)
CHLORIDE SERPL-SCNC: 105 MMOL/L (ref 98–107)
CLARITY UR: ABNORMAL
CO2 SERPL-SCNC: 28 MMOL/L (ref 22–29)
COLOR UR: YELLOW
CREAT SERPL-MCNC: 0.6 MG/DL (ref 0.5–1)
EOSINOPHIL # BLD: 0.09 K/UL (ref 0.05–0.5)
EOSINOPHILS RELATIVE PERCENT: 1 % (ref 0–6)
EPI CELLS #/AREA URNS HPF: ABNORMAL /HPF
ERYTHROCYTE [DISTWIDTH] IN BLOOD BY AUTOMATED COUNT: 13.5 % (ref 11.5–15)
GFR, ESTIMATED: >90 ML/MIN/1.73M2
GLUCOSE SERPL-MCNC: 86 MG/DL (ref 74–99)
GLUCOSE UR STRIP-MCNC: 100 MG/DL
HCT VFR BLD AUTO: 43.5 % (ref 34–48)
HGB BLD-MCNC: 14.4 G/DL (ref 11.5–15.5)
HGB UR QL STRIP.AUTO: NEGATIVE
IMM GRANULOCYTES # BLD AUTO: <0.03 K/UL (ref 0–0.58)
IMM GRANULOCYTES NFR BLD: 0 % (ref 0–5)
KETONES UR STRIP-MCNC: ABNORMAL MG/DL
LACTATE BLDV-SCNC: 1.8 MMOL/L (ref 0.5–2.2)
LEUKOCYTE ESTERASE UR QL STRIP: NEGATIVE
LIPASE SERPL-CCNC: 21 U/L (ref 13–60)
LYMPHOCYTES NFR BLD: 2.09 K/UL (ref 1.5–4)
LYMPHOCYTES RELATIVE PERCENT: 30 % (ref 20–42)
MAGNESIUM SERPL-MCNC: 2 MG/DL (ref 1.6–2.6)
MCH RBC QN AUTO: 30.9 PG (ref 26–35)
MCHC RBC AUTO-ENTMCNC: 33.1 G/DL (ref 32–34.5)
MCV RBC AUTO: 93.3 FL (ref 80–99.9)
MONOCYTES NFR BLD: 0.54 K/UL (ref 0.1–0.95)
MONOCYTES NFR BLD: 8 % (ref 2–12)
NEUTROPHILS NFR BLD: 61 % (ref 43–80)
NEUTS SEG NFR BLD: 4.26 K/UL (ref 1.8–7.3)
NITRITE UR QL STRIP: NEGATIVE
PH UR STRIP: 6 [PH] (ref 5–9)
PLATELET # BLD AUTO: 237 K/UL (ref 130–450)
PMV BLD AUTO: 10.5 FL (ref 7–12)
POTASSIUM SERPL-SCNC: 3.3 MMOL/L (ref 3.5–5)
PROT SERPL-MCNC: 7.1 G/DL (ref 6.4–8.3)
PROT UR STRIP-MCNC: 30 MG/DL
RBC # BLD AUTO: 4.66 M/UL (ref 3.5–5.5)
RBC #/AREA URNS HPF: ABNORMAL /HPF
SODIUM SERPL-SCNC: 145 MMOL/L (ref 132–146)
SP GR UR STRIP: >1.03 (ref 1–1.03)
TROPONIN I SERPL HS-MCNC: 10 NG/L (ref 0–9)
TROPONIN I SERPL HS-MCNC: 10 NG/L (ref 0–9)
UROBILINOGEN UR STRIP-ACNC: 1 EU/DL (ref 0–1)
WBC #/AREA URNS HPF: ABNORMAL /HPF
WBC OTHER # BLD: 7 K/UL (ref 4.5–11.5)

## 2024-09-22 PROCEDURE — 70450 CT HEAD/BRAIN W/O DYE: CPT

## 2024-09-22 PROCEDURE — 99285 EMERGENCY DEPT VISIT HI MDM: CPT

## 2024-09-22 PROCEDURE — 83605 ASSAY OF LACTIC ACID: CPT

## 2024-09-22 PROCEDURE — 83735 ASSAY OF MAGNESIUM: CPT

## 2024-09-22 PROCEDURE — 87086 URINE CULTURE/COLONY COUNT: CPT

## 2024-09-22 PROCEDURE — 80053 COMPREHEN METABOLIC PANEL: CPT

## 2024-09-22 PROCEDURE — 84484 ASSAY OF TROPONIN QUANT: CPT

## 2024-09-22 PROCEDURE — 83690 ASSAY OF LIPASE: CPT

## 2024-09-22 PROCEDURE — 6360000004 HC RX CONTRAST MEDICATION: Performed by: RADIOLOGY

## 2024-09-22 PROCEDURE — 81001 URINALYSIS AUTO W/SCOPE: CPT

## 2024-09-22 PROCEDURE — 6370000000 HC RX 637 (ALT 250 FOR IP): Performed by: STUDENT IN AN ORGANIZED HEALTH CARE EDUCATION/TRAINING PROGRAM

## 2024-09-22 PROCEDURE — 71045 X-RAY EXAM CHEST 1 VIEW: CPT

## 2024-09-22 PROCEDURE — 74177 CT ABD & PELVIS W/CONTRAST: CPT

## 2024-09-22 PROCEDURE — 93005 ELECTROCARDIOGRAM TRACING: CPT | Performed by: STUDENT IN AN ORGANIZED HEALTH CARE EDUCATION/TRAINING PROGRAM

## 2024-09-22 PROCEDURE — 85025 COMPLETE CBC W/AUTO DIFF WBC: CPT

## 2024-09-22 RX ORDER — POTASSIUM CHLORIDE 1500 MG/1
40 TABLET, EXTENDED RELEASE ORAL ONCE
Status: COMPLETED | OUTPATIENT
Start: 2024-09-22 | End: 2024-09-22

## 2024-09-22 RX ORDER — IOPAMIDOL 755 MG/ML
75 INJECTION, SOLUTION INTRAVASCULAR
Status: COMPLETED | OUTPATIENT
Start: 2024-09-22 | End: 2024-09-22

## 2024-09-22 RX ADMIN — IOPAMIDOL 75 ML: 755 INJECTION, SOLUTION INTRAVENOUS at 21:17

## 2024-09-22 RX ADMIN — POTASSIUM CHLORIDE 40 MEQ: 1500 TABLET, EXTENDED RELEASE ORAL at 23:48
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
## 2024-09-23 ENCOUNTER — APPOINTMENT (OUTPATIENT)
Dept: ULTRASOUND IMAGING | Age: 69
DRG: 641 | End: 2024-09-23
Payer: MEDICARE

## 2024-09-23 PROBLEM — R19.7 DIARRHEA: Status: ACTIVE | Noted: 2024-09-23

## 2024-09-23 PROBLEM — R11.2 NAUSEA AND VOMITING: Status: ACTIVE | Noted: 2024-09-23

## 2024-09-23 PROBLEM — E87.6 HYPOKALEMIA: Status: ACTIVE | Noted: 2024-09-23

## 2024-09-23 LAB
ANION GAP SERPL CALCULATED.3IONS-SCNC: 16 MMOL/L (ref 7–16)
BASOPHILS # BLD: 0.05 K/UL (ref 0–0.2)
BASOPHILS NFR BLD: 1 % (ref 0–2)
BUN SERPL-MCNC: 11 MG/DL (ref 6–23)
CALCIUM SERPL-MCNC: 9.1 MG/DL (ref 8.6–10.2)
CHLORIDE SERPL-SCNC: 108 MMOL/L (ref 98–107)
CHOLEST SERPL-MCNC: 192 MG/DL
CO2 SERPL-SCNC: 21 MMOL/L (ref 22–29)
CREAT SERPL-MCNC: 0.6 MG/DL (ref 0.5–1)
EKG ATRIAL RATE: 89 BPM
EKG P AXIS: 83 DEGREES
EKG P-R INTERVAL: 102 MS
EKG Q-T INTERVAL: 380 MS
EKG QRS DURATION: 98 MS
EKG QTC CALCULATION (BAZETT): 462 MS
EKG R AXIS: 5 DEGREES
EKG T AXIS: 62 DEGREES
EKG VENTRICULAR RATE: 89 BPM
EOSINOPHIL # BLD: 0.14 K/UL (ref 0.05–0.5)
EOSINOPHILS RELATIVE PERCENT: 2 % (ref 0–6)
ERYTHROCYTE [DISTWIDTH] IN BLOOD BY AUTOMATED COUNT: 13.7 % (ref 11.5–15)
GFR, ESTIMATED: >90 ML/MIN/1.73M2
GLUCOSE SERPL-MCNC: 84 MG/DL (ref 74–99)
HBA1C MFR BLD: 5.5 % (ref 4–5.6)
HCT VFR BLD AUTO: 41.9 % (ref 34–48)
HDLC SERPL-MCNC: 68 MG/DL
HGB BLD-MCNC: 13.6 G/DL (ref 11.5–15.5)
IMM GRANULOCYTES # BLD AUTO: <0.03 K/UL (ref 0–0.58)
IMM GRANULOCYTES NFR BLD: 0 % (ref 0–5)
LDLC SERPL CALC-MCNC: 114 MG/DL
LYMPHOCYTES NFR BLD: 2.29 K/UL (ref 1.5–4)
LYMPHOCYTES RELATIVE PERCENT: 37 % (ref 20–42)
MCH RBC QN AUTO: 30.8 PG (ref 26–35)
MCHC RBC AUTO-ENTMCNC: 32.5 G/DL (ref 32–34.5)
MCV RBC AUTO: 94.8 FL (ref 80–99.9)
MONOCYTES NFR BLD: 0.51 K/UL (ref 0.1–0.95)
MONOCYTES NFR BLD: 8 % (ref 2–12)
NEUTROPHILS NFR BLD: 52 % (ref 43–80)
NEUTS SEG NFR BLD: 3.27 K/UL (ref 1.8–7.3)
PLATELET # BLD AUTO: 208 K/UL (ref 130–450)
PMV BLD AUTO: 10.4 FL (ref 7–12)
POTASSIUM SERPL-SCNC: 3.9 MMOL/L (ref 3.5–5)
RBC # BLD AUTO: 4.42 M/UL (ref 3.5–5.5)
SODIUM SERPL-SCNC: 145 MMOL/L (ref 132–146)
TRIGL SERPL-MCNC: 52 MG/DL
TROPONIN I SERPL HS-MCNC: 11 NG/L (ref 0–9)
VLDLC SERPL CALC-MCNC: 10 MG/DL
WBC OTHER # BLD: 6.3 K/UL (ref 4.5–11.5)

## 2024-09-23 PROCEDURE — 80048 BASIC METABOLIC PNL TOTAL CA: CPT

## 2024-09-23 PROCEDURE — 80061 LIPID PANEL: CPT

## 2024-09-23 PROCEDURE — 6370000000 HC RX 637 (ALT 250 FOR IP): Performed by: NURSE PRACTITIONER

## 2024-09-23 PROCEDURE — 2580000003 HC RX 258: Performed by: NURSE PRACTITIONER

## 2024-09-23 PROCEDURE — 93010 ELECTROCARDIOGRAM REPORT: CPT | Performed by: INTERNAL MEDICINE

## 2024-09-23 PROCEDURE — 93880 EXTRACRANIAL BILAT STUDY: CPT

## 2024-09-23 PROCEDURE — 2060000000 HC ICU INTERMEDIATE R&B

## 2024-09-23 PROCEDURE — 85025 COMPLETE CBC W/AUTO DIFF WBC: CPT

## 2024-09-23 PROCEDURE — 84484 ASSAY OF TROPONIN QUANT: CPT

## 2024-09-23 PROCEDURE — 92523 SPEECH SOUND LANG COMPREHEN: CPT

## 2024-09-23 PROCEDURE — 6370000000 HC RX 637 (ALT 250 FOR IP)

## 2024-09-23 PROCEDURE — 83036 HEMOGLOBIN GLYCOSYLATED A1C: CPT

## 2024-09-23 PROCEDURE — G0378 HOSPITAL OBSERVATION PER HR: HCPCS

## 2024-09-23 PROCEDURE — 6370000000 HC RX 637 (ALT 250 FOR IP): Performed by: STUDENT IN AN ORGANIZED HEALTH CARE EDUCATION/TRAINING PROGRAM

## 2024-09-23 PROCEDURE — 92610 EVALUATE SWALLOWING FUNCTION: CPT

## 2024-09-23 PROCEDURE — 94640 AIRWAY INHALATION TREATMENT: CPT

## 2024-09-23 PROCEDURE — 6360000002 HC RX W HCPCS: Performed by: NURSE PRACTITIONER

## 2024-09-23 RX ORDER — LORAZEPAM 0.5 MG/1
0.5 TABLET ORAL ONCE
Status: COMPLETED | OUTPATIENT
Start: 2024-09-23 | End: 2024-09-23

## 2024-09-23 RX ORDER — BUDESONIDE 0.5 MG/2ML
0.5 INHALANT ORAL
Status: DISCONTINUED | OUTPATIENT
Start: 2024-09-23 | End: 2024-09-24 | Stop reason: HOSPADM

## 2024-09-23 RX ORDER — CETIRIZINE HYDROCHLORIDE 10 MG/1
5 TABLET ORAL NIGHTLY
Status: DISCONTINUED | OUTPATIENT
Start: 2024-09-23 | End: 2024-09-24 | Stop reason: HOSPADM

## 2024-09-23 RX ORDER — ENOXAPARIN SODIUM 100 MG/ML
40 INJECTION SUBCUTANEOUS DAILY
Status: DISCONTINUED | OUTPATIENT
Start: 2024-09-23 | End: 2024-09-24 | Stop reason: HOSPADM

## 2024-09-23 RX ORDER — LORAZEPAM 1 MG/1
1 TABLET ORAL ONCE
Status: COMPLETED | OUTPATIENT
Start: 2024-09-23 | End: 2024-09-23

## 2024-09-23 RX ORDER — DULOXETIN HYDROCHLORIDE 60 MG/1
60 CAPSULE, DELAYED RELEASE ORAL DAILY
COMMUNITY

## 2024-09-23 RX ORDER — ALBUTEROL SULFATE 90 UG/1
2 INHALANT RESPIRATORY (INHALATION) EVERY 4 HOURS PRN
COMMUNITY

## 2024-09-23 RX ORDER — NICOTINE 21 MG/24HR
1 PATCH, TRANSDERMAL 24 HOURS TRANSDERMAL DAILY
Status: DISCONTINUED | OUTPATIENT
Start: 2024-09-24 | End: 2024-09-24 | Stop reason: HOSPADM

## 2024-09-23 RX ORDER — MAGNESIUM SULFATE IN WATER 40 MG/ML
2000 INJECTION, SOLUTION INTRAVENOUS PRN
Status: DISCONTINUED | OUTPATIENT
Start: 2024-09-23 | End: 2024-09-24 | Stop reason: HOSPADM

## 2024-09-23 RX ORDER — ALBUTEROL SULFATE 90 UG/1
2 INHALANT RESPIRATORY (INHALATION) EVERY 4 HOURS PRN
Status: DISCONTINUED | OUTPATIENT
Start: 2024-09-23 | End: 2024-09-23 | Stop reason: CLARIF

## 2024-09-23 RX ORDER — ONDANSETRON 4 MG/1
4 TABLET, ORALLY DISINTEGRATING ORAL EVERY 8 HOURS PRN
Status: DISCONTINUED | OUTPATIENT
Start: 2024-09-23 | End: 2024-09-24 | Stop reason: HOSPADM

## 2024-09-23 RX ORDER — ERGOCALCIFEROL 1.25 MG/1
50000 CAPSULE, LIQUID FILLED ORAL WEEKLY
COMMUNITY

## 2024-09-23 RX ORDER — SODIUM CHLORIDE 0.9 % (FLUSH) 0.9 %
5-40 SYRINGE (ML) INJECTION EVERY 12 HOURS SCHEDULED
Status: DISCONTINUED | OUTPATIENT
Start: 2024-09-23 | End: 2024-09-24 | Stop reason: HOSPADM

## 2024-09-23 RX ORDER — SODIUM CHLORIDE 0.9 % (FLUSH) 0.9 %
5-40 SYRINGE (ML) INJECTION PRN
Status: DISCONTINUED | OUTPATIENT
Start: 2024-09-23 | End: 2024-09-24 | Stop reason: HOSPADM

## 2024-09-23 RX ORDER — ASPIRIN 81 MG/1
81 TABLET ORAL DAILY
COMMUNITY

## 2024-09-23 RX ORDER — ACETAMINOPHEN 650 MG/1
650 SUPPOSITORY RECTAL EVERY 6 HOURS PRN
Status: DISCONTINUED | OUTPATIENT
Start: 2024-09-23 | End: 2024-09-24 | Stop reason: HOSPADM

## 2024-09-23 RX ORDER — SENNA AND DOCUSATE SODIUM 50; 8.6 MG/1; MG/1
1 TABLET, FILM COATED ORAL 2 TIMES DAILY
Status: DISCONTINUED | OUTPATIENT
Start: 2024-09-23 | End: 2024-09-24 | Stop reason: HOSPADM

## 2024-09-23 RX ORDER — SODIUM CHLORIDE 9 MG/ML
INJECTION, SOLUTION INTRAVENOUS PRN
Status: DISCONTINUED | OUTPATIENT
Start: 2024-09-23 | End: 2024-09-24 | Stop reason: HOSPADM

## 2024-09-23 RX ORDER — SODIUM CHLORIDE 9 MG/ML
INJECTION, SOLUTION INTRAVENOUS CONTINUOUS
Status: DISCONTINUED | OUTPATIENT
Start: 2024-09-23 | End: 2024-09-24 | Stop reason: HOSPADM

## 2024-09-23 RX ORDER — POTASSIUM CHLORIDE 1500 MG/1
40 TABLET, EXTENDED RELEASE ORAL PRN
Status: DISCONTINUED | OUTPATIENT
Start: 2024-09-23 | End: 2024-09-24 | Stop reason: HOSPADM

## 2024-09-23 RX ORDER — ARFORMOTEROL TARTRATE 15 UG/2ML
15 SOLUTION RESPIRATORY (INHALATION)
Status: DISCONTINUED | OUTPATIENT
Start: 2024-09-23 | End: 2024-09-24 | Stop reason: HOSPADM

## 2024-09-23 RX ORDER — LEVOTHYROXINE SODIUM 50 UG/1
50 TABLET ORAL DAILY
Status: DISCONTINUED | OUTPATIENT
Start: 2024-09-23 | End: 2024-09-24 | Stop reason: HOSPADM

## 2024-09-23 RX ORDER — ONDANSETRON 2 MG/ML
4 INJECTION INTRAMUSCULAR; INTRAVENOUS EVERY 6 HOURS PRN
Status: DISCONTINUED | OUTPATIENT
Start: 2024-09-23 | End: 2024-09-24 | Stop reason: HOSPADM

## 2024-09-23 RX ORDER — HYDRALAZINE HYDROCHLORIDE 20 MG/ML
10 INJECTION INTRAMUSCULAR; INTRAVENOUS EVERY 6 HOURS PRN
Status: DISCONTINUED | OUTPATIENT
Start: 2024-09-23 | End: 2024-09-24 | Stop reason: HOSPADM

## 2024-09-23 RX ORDER — VARENICLINE TARTRATE 1 MG/1
1 TABLET, FILM COATED ORAL 2 TIMES DAILY
COMMUNITY

## 2024-09-23 RX ORDER — ALBUTEROL SULFATE 0.83 MG/ML
2.5 SOLUTION RESPIRATORY (INHALATION) EVERY 4 HOURS PRN
Status: DISCONTINUED | OUTPATIENT
Start: 2024-09-23 | End: 2024-09-24 | Stop reason: HOSPADM

## 2024-09-23 RX ORDER — POTASSIUM CHLORIDE 7.45 MG/ML
10 INJECTION INTRAVENOUS PRN
Status: DISCONTINUED | OUTPATIENT
Start: 2024-09-23 | End: 2024-09-24 | Stop reason: HOSPADM

## 2024-09-23 RX ORDER — ACETAMINOPHEN 325 MG/1
650 TABLET ORAL EVERY 6 HOURS PRN
Status: DISCONTINUED | OUTPATIENT
Start: 2024-09-23 | End: 2024-09-24 | Stop reason: HOSPADM

## 2024-09-23 RX ORDER — BISACODYL 10 MG
10 SUPPOSITORY, RECTAL RECTAL DAILY PRN
Status: DISCONTINUED | OUTPATIENT
Start: 2024-09-23 | End: 2024-09-24 | Stop reason: HOSPADM

## 2024-09-23 RX ADMIN — ARFORMOTEROL TARTRATE 15 MCG: 15 SOLUTION RESPIRATORY (INHALATION) at 21:29

## 2024-09-23 RX ADMIN — LORAZEPAM 1 MG: 1 TABLET ORAL at 00:45

## 2024-09-23 RX ADMIN — SODIUM CHLORIDE: 9 INJECTION, SOLUTION INTRAVENOUS at 06:28

## 2024-09-23 RX ADMIN — IPRATROPIUM BROMIDE 0.5 MG: 0.5 SOLUTION RESPIRATORY (INHALATION) at 21:29

## 2024-09-23 RX ADMIN — SODIUM CHLORIDE: 9 INJECTION, SOLUTION INTRAVENOUS at 23:40

## 2024-09-23 RX ADMIN — SODIUM CHLORIDE, PRESERVATIVE FREE 10 ML: 5 INJECTION INTRAVENOUS at 23:43

## 2024-09-23 RX ADMIN — LEVOTHYROXINE SODIUM 50 MCG: 0.05 TABLET ORAL at 06:28

## 2024-09-23 RX ADMIN — BUDESONIDE 500 MCG: 0.5 SUSPENSION RESPIRATORY (INHALATION) at 06:46

## 2024-09-23 RX ADMIN — ARFORMOTEROL TARTRATE 15 MCG: 15 SOLUTION RESPIRATORY (INHALATION) at 06:46

## 2024-09-23 RX ADMIN — CETIRIZINE HYDROCHLORIDE 5 MG: 10 TABLET, FILM COATED ORAL at 22:07

## 2024-09-23 RX ADMIN — LORAZEPAM 0.5 MG: 0.5 TABLET ORAL at 22:06

## 2024-09-23 RX ADMIN — IPRATROPIUM BROMIDE 0.5 MG: 0.5 SOLUTION RESPIRATORY (INHALATION) at 06:46

## 2024-09-23 RX ADMIN — BUDESONIDE 500 MCG: 0.5 SUSPENSION RESPIRATORY (INHALATION) at 21:29

## 2024-09-23 ASSESSMENT — PAIN - FUNCTIONAL ASSESSMENT
PAIN_FUNCTIONAL_ASSESSMENT: NONE - DENIES PAIN
PAIN_FUNCTIONAL_ASSESSMENT: NONE - DENIES PAIN

## 2024-09-23 ASSESSMENT — ENCOUNTER SYMPTOMS
VOMITING: 1
ABDOMINAL DISTENTION: 0
PHOTOPHOBIA: 0
COUGH: 0
DIARRHEA: 1
CHEST TIGHTNESS: 0
NAUSEA: 1
ABDOMINAL PAIN: 0
SHORTNESS OF BREATH: 0

## 2024-09-23 NOTE — PROGRESS NOTES
SPEECH/LANGUAGE PATHOLOGY  SPEECH/LANGUAGE/COGNITIVE EVALUATION   and PLAN OF CARE      PATIENT NAME:  Clarisa Gunter  (female)     MRN:  [id number]    :  1955  (69 y.o.)  STATUS:  Emergency visit:  Room 17B-17    TODAY'S DATE:  2024  REFERRING PROVIDER:       SPECIFIC PROVIDER ORDER: SLP eval and treat  Date of order:  24  REASON FOR REFERRAL: AMS  EVALUATING THERAPIST: ED Negron    ADMITTING DIAGNOSIS: TIA (transient ischemic attack) [G45.9]    VISIT DIAGNOSIS:   Visit Diagnoses         Codes    Essential hypertension     I10    Enteritis     K52.9    Constipation, unspecified constipation type     K59.00             SPEECH THERAPY  PLAN OF CARE   The speech therapy  POC is established based on physician order, speech pathology diagnosis and results of clinical assessment     SPEECH PATHOLOGY DIAGNOSIS:    Within function limits    Speech Pathology intervention is not warranted at this time.     Conditions Requiring Skilled Therapeutic Intervention for speech, language and/or cognition  Not applicable     Specific Speech Therapy Interventions to Include:   Not applicable    Specific instructions for next treatment:    Not applicable    SHORT/LONG TERM GOALS  Not applicable      Patient goals: Patient/family involved in developing goals and treatment plan:   Treatment goals discussed with Patient    The Patient understand(s) the diagnosis, prognosis and plan of care   The patient/family Agreed with above,     This plan may be re-evaluated and revised as warranted.        Rehabilitation Potential/Prognosis: not applicable                CLINICAL ASSESSMENT:  MOTOR SPEECH       Oral Peripheral Examination   Adequate lingual/labial strength     Parameters of Speech Production  Respiration:  Adequate for speech production  Articulation:  Within functional limits  Resonance:  Within functional limits  Quality:   Within functional limits  Pitch:    Within functional  comprehension      The admitting diagnosis and active problem list, as listed below have been reviewed prior to initiation of this evaluation.        ACTIVE PROBLEM LIST:   Patient Active Problem List   Diagnosis    Cerebral atherosclerosis    Peripheral neuropathy due to ischemia    History of cerebral infarction    Right thalamic stroke ([facility])    Tobacco abuse    Peripheral neuropathy    Hypothyroidism    Muscle spasm    Acute CVA (cerebrovascular accident) ([facility])    Moderate protein-calorie malnutrition ([facility])    Hip fracture requiring operative repair, left, closed, initial encounter ([facility])    Fracture of unspecified part of neck of right femur, initial encounter for closed fracture ([facility])    Osteoarthritis of right hip, unspecified osteoarthritis type    Primary osteoarthritis of right hip    Closed fracture of femur, intertrochanteric, left, initial encounter ([facility])    Closed left hip fracture, initial encounter ([facility])    TIA (transient ischemic attack)    Hypokalemia    Nausea and vomiting    Diarrhea

## 2024-09-23 NOTE — PROGRESS NOTES
SPEECH/LANGUAGE PATHOLOGY  CLINICAL ASSESSMENT OF SWALLOWING FUNCTION   and PLAN OF CARE  PATIENT NAME:  Clarisa Gunter  (female)     MRN:  [id number]    :  1955  (69 y.o.)  STATUS:  Emergency visit:  Room 17BB-17    TODAY'S DATE:  2024  REFERRING PROVIDER:         SPECIFIC PROVIDER ORDER: SLP eval and treat Date of order:  24  REASON FOR REFERRAL: AMS   EVALUATING THERAPIST: ED Negron                 ASSESSMENT:    DYSPHAGIA DIAGNOSIS:   functional oropharyngeal swallow for age/premorbid functioning      DIET RECOMMENDATIONS:  Regular consistency solids (IDDSI level 7) with  thin liquids (IDDSI level 0)     FEEDING RECOMMENDATIONS:     Assistance level:  No assistance needed      Compensatory strategies recommended: No strategies are recommended at this time      Discussed recommendations with:  Patient     SPEECH THERAPY  PLAN OF CARE   The dysphagia POC is established based on physician order, dysphagia diagnosis and results of clinical assessment     Dysphagia therapy is not recommended     Conditions Requiring Skilled Therapeutic Intervention for dysphagia:    not applicable    Specific dysphagia interventions to include:     Not applicable    Specific instructions for next treatment:  not applicable   Patient Treatment Goals:    Short Term Goals:  Not applicable no therapy warranted     Long Term Goals:   Not applicable no therapy warranted      Patient/family Goal:    not applicable                    ADMITTING DIAGNOSIS: TIA (transient ischemic attack) [G45.9]    VISIT DIAGNOSIS:   Visit Diagnoses         Codes    Essential hypertension     I10    Enteritis     K52.9    Constipation, unspecified constipation type     K59.00             PATIENT REPORT/COMPLAINT: denies difficulty swallowing  nursing not available at time of evaluation chart reviewed and patient is not NPO for any procedures per current documentation.     PRIOR LEVEL OF SWALLOW FUNCTION:    PAST HISTORY OF  DYSPHAGIA?: none reported    Home diet: Regular consistency solids (IDDSI level 7) with  thin liquids (IDDSI level 0)    Current Diet Order:  Diet NPO    PROCEDURE:  Consistencies Administered During the Evaluation   Liquids: thin liquid   Solids:  pureed foods and soft solid foods      Method of Intake:   cup, straw, spoon  Self fed      Position:   Seated, upright    CLINICAL ASSESSMENT  Oral Stage:       The oral stage of swallowing was within functional limits      Pharyngeal Stage:    No signs of aspiration were noted during this evaluation however, silent aspiration cannot be ruled out at bedside.  If silent aspiration is suspected, a Videofluoroscopic Study of Swallowing (MBS) is recommended and requires a physician order.    Cognition:   Within functional limits for this exam    Oral Peripheral Examination   Adequate lingual/labial strength     Current Respiratory Status    room air     Parameters of Speech Production  Respiration:  Adequate for speech production  Quality:   Within functional limits  Intensity: Within functional limits    Volitional Swallow: Present    Volitional Cough:    Present    Pain: No pain reported.     EDUCATION:   The Speech Language Pathologist (SLP) completed education regarding results of evaluation and that intervention is not warranted at this time.  Learner: Patient  Education:  Reviewed results and recommendations of this evaluation  Evaluation of Education: Verbalizes understanding    This plan will be re-evaluated and revised in 1 week  if warranted.      CPT code:  95247  bedside swallow eval      [x]The admitting diagnosis and active problem list, have been reviewed prior to initiation of this evaluation.        ACTIVE PROBLEM LIST:   Patient Active Problem List   Diagnosis    Cerebral atherosclerosis    Peripheral neuropathy due to ischemia    History of cerebral infarction    Right thalamic stroke (HCC)    Tobacco abuse    Peripheral neuropathy    Hypothyroidism

## 2024-09-23 NOTE — H&P
Clarisa Gunter is a 69 y.o. female  Chief Complaint   Patient presents with    Dizziness     Lightheaded at times    difficulty controlling left arm     Difficulty controlling left arm for last 4 years     HPI  As above, this am, she is also c/o being sob and weak. She admits she lives in a hotel and is homeless. She denies cp, n/v. She does have a good appetite and sleeps well. She has a hx of CVA.  Allergies   Allergen Reactions    Aspirin      CAN TAKE ENTERIC COATED ONLY    Garlic Oil Itching     Shaking.      Code Status: FULL CODE  No current facility-administered medications on file prior to encounter.     Current Outpatient Medications on File Prior to Encounter   Medication Sig Dispense Refill    albuterol sulfate HFA (PROVENTIL;VENTOLIN;PROAIR) 108 (90 Base) MCG/ACT inhaler Inhale 2 puffs into the lungs every 4 hours as needed      diclofenac sodium (VOLTAREN) 1 % GEL Apply 2 g topically 4 times daily as needed      clotrimazole-betamethasone (LOTRISONE) 1-0.05 % cream Apply 1 Application topically 2 times daily      BREZTRI AEROSPHERE 160-9-4.8 MCG/ACT AERO Inhale 2 puffs into the lungs 2 times daily      acetaminophen (TYLENOL) 325 MG tablet Take 2 tablets by mouth in the morning and 2 tablets at noon and 2 tablets in the evening and 2 tablets before bedtime. 120 tablet 3    aspirin 325 MG EC tablet Take 1 tablet by mouth daily 42 tablet 0    sennosides-docusate sodium (SENOKOT-S) 8.6-50 MG tablet Take 1 tablet by mouth 2 times daily 30 tablet 0    pregabalin (LYRICA) 300 MG capsule Take 1 capsule by mouth 2 times daily.      vitamin D (ERGOCALCIFEROL) 1.25 MG (30253 UT) CAPS capsule Take 1 capsule by mouth once a week 6 capsule 0    levothyroxine (SYNTHROID) 50 MCG tablet Take 1 tablet by mouth Daily      levocetirizine (XYZAL) 5 MG tablet Take 1 tablet by mouth nightly       Past Medical History:   Diagnosis Date    Anxiety     Arthritis     Cerebrovascular accident (CVA) due to occlusion of small

## 2024-09-24 ENCOUNTER — APPOINTMENT (OUTPATIENT)
Dept: MRI IMAGING | Age: 69
DRG: 641 | End: 2024-09-24
Payer: MEDICARE

## 2024-09-24 VITALS
HEART RATE: 95 BPM | WEIGHT: 108 LBS | SYSTOLIC BLOOD PRESSURE: 141 MMHG | TEMPERATURE: 97 F | DIASTOLIC BLOOD PRESSURE: 80 MMHG | OXYGEN SATURATION: 98 % | RESPIRATION RATE: 20 BRPM | BODY MASS INDEX: 17.36 KG/M2 | HEIGHT: 66 IN

## 2024-09-24 LAB
ANION GAP SERPL CALCULATED.3IONS-SCNC: 8 MMOL/L (ref 7–16)
BASOPHILS # BLD: 0.04 K/UL (ref 0–0.2)
BASOPHILS NFR BLD: 1 % (ref 0–2)
BUN SERPL-MCNC: 12 MG/DL (ref 6–23)
CALCIUM SERPL-MCNC: 8.3 MG/DL (ref 8.6–10.2)
CHLORIDE SERPL-SCNC: 113 MMOL/L (ref 98–107)
CO2 SERPL-SCNC: 27 MMOL/L (ref 22–29)
CREAT SERPL-MCNC: 0.6 MG/DL (ref 0.5–1)
EOSINOPHIL # BLD: 0.13 K/UL (ref 0.05–0.5)
EOSINOPHILS RELATIVE PERCENT: 2 % (ref 0–6)
ERYTHROCYTE [DISTWIDTH] IN BLOOD BY AUTOMATED COUNT: 14 % (ref 11.5–15)
GFR, ESTIMATED: >90 ML/MIN/1.73M2
GLUCOSE SERPL-MCNC: 76 MG/DL (ref 74–99)
HCT VFR BLD AUTO: 36.7 % (ref 34–48)
HGB BLD-MCNC: 12 G/DL (ref 11.5–15.5)
IMM GRANULOCYTES # BLD AUTO: <0.03 K/UL (ref 0–0.58)
IMM GRANULOCYTES NFR BLD: 0 % (ref 0–5)
LYMPHOCYTES NFR BLD: 1.93 K/UL (ref 1.5–4)
LYMPHOCYTES RELATIVE PERCENT: 36 % (ref 20–42)
MCH RBC QN AUTO: 30.9 PG (ref 26–35)
MCHC RBC AUTO-ENTMCNC: 32.7 G/DL (ref 32–34.5)
MCV RBC AUTO: 94.6 FL (ref 80–99.9)
MONOCYTES NFR BLD: 0.47 K/UL (ref 0.1–0.95)
MONOCYTES NFR BLD: 9 % (ref 2–12)
NEUTROPHILS NFR BLD: 52 % (ref 43–80)
NEUTS SEG NFR BLD: 2.77 K/UL (ref 1.8–7.3)
PLATELET # BLD AUTO: 209 K/UL (ref 130–450)
PMV BLD AUTO: 11.1 FL (ref 7–12)
POTASSIUM SERPL-SCNC: 3.9 MMOL/L (ref 3.5–5)
RBC # BLD AUTO: 3.88 M/UL (ref 3.5–5.5)
SODIUM SERPL-SCNC: 148 MMOL/L (ref 132–146)
WBC OTHER # BLD: 5.4 K/UL (ref 4.5–11.5)

## 2024-09-24 PROCEDURE — 97530 THERAPEUTIC ACTIVITIES: CPT

## 2024-09-24 PROCEDURE — G0378 HOSPITAL OBSERVATION PER HR: HCPCS

## 2024-09-24 PROCEDURE — 97161 PT EVAL LOW COMPLEX 20 MIN: CPT

## 2024-09-24 PROCEDURE — 36415 COLL VENOUS BLD VENIPUNCTURE: CPT

## 2024-09-24 PROCEDURE — 6370000000 HC RX 637 (ALT 250 FOR IP): Performed by: NURSE PRACTITIONER

## 2024-09-24 PROCEDURE — 85025 COMPLETE CBC W/AUTO DIFF WBC: CPT

## 2024-09-24 PROCEDURE — 94640 AIRWAY INHALATION TREATMENT: CPT

## 2024-09-24 PROCEDURE — 6360000002 HC RX W HCPCS: Performed by: NURSE PRACTITIONER

## 2024-09-24 PROCEDURE — 80048 BASIC METABOLIC PNL TOTAL CA: CPT

## 2024-09-24 PROCEDURE — 70551 MRI BRAIN STEM W/O DYE: CPT

## 2024-09-24 RX ORDER — DOCUSATE SODIUM 100 MG/1
100 CAPSULE, LIQUID FILLED ORAL 2 TIMES DAILY
Qty: 60 CAPSULE | Refills: 0 | Status: SHIPPED | OUTPATIENT
Start: 2024-09-24 | End: 2024-10-24

## 2024-09-24 RX ADMIN — IPRATROPIUM BROMIDE 0.5 MG: 0.5 SOLUTION RESPIRATORY (INHALATION) at 07:43

## 2024-09-24 RX ADMIN — BUDESONIDE 500 MCG: 0.5 SUSPENSION RESPIRATORY (INHALATION) at 07:43

## 2024-09-24 RX ADMIN — ARFORMOTEROL TARTRATE 15 MCG: 15 SOLUTION RESPIRATORY (INHALATION) at 07:42

## 2024-09-24 RX ADMIN — LEVOTHYROXINE SODIUM 50 MCG: 0.05 TABLET ORAL at 05:36

## 2024-09-24 NOTE — PROGRESS NOTES
4 Eyes Skin Assessment     NAME:  Clarisa Gunter  YOB: 1955  MEDICAL RECORD NUMBER:  [id number]    The patient is being assessed for  Admission    I agree that at least one RN has performed a thorough Head to Toe Skin Assessment on the patient. ALL assessment sites listed below have been assessed.      Areas assessed by both nurses:    Head, Face, Ears, Shoulders, Back, Chest, Arms, Elbows, Hands, Sacrum. Buttock, Coccyx, Ischium, and Legs. Feet and Heels        Does the Patient have a Wound? No noted wound(s)       [place] Prevention initiated by RN: yes  Wound Care Orders initiated by RN: No    Pressure Injury (Stage 3,4, Unstageable, DTI, NWPT, and Complex wounds) if present, place Wound referral order by RN under : No    New Ostomies, if present place, Ostomy referral order under : No     Nurse 1 eSignature: Electronically signed by Erica Cobian RN on 9/23/24 at 8:38 PM EDT    **SHARE this note so that the co-signing nurse can place an eSignature**    Nurse 2 eSignature: Electronically signed by Liane Mendoza RN on 9/24/24 at 3:46 AM EDT

## 2024-09-24 NOTE — PROGRESS NOTES
Physical Therapy  Physical Therapy Initial Assessment     Name: Clarisa Gunter  : 1955  MRN: [id number]      Date of Service: 2024    Evaluating PT:  Dania Love PT, DPT [id number]    Room #:  7405/7405-A  Diagnosis:  TIA (transient ischemic attack) [G45.9]  Hypokalemia [E87.6]  Enteritis [K52.9]  Essential hypertension [I10]  Constipation, unspecified constipation type [K59.00]  Nausea and vomiting, unspecified vomiting type [R11.2]  PMHx/PSHx:    Past Medical History:   Diagnosis Date    Anxiety     Arthritis     Cerebrovascular accident (CVA) due to occlusion of small artery (HCC) 2020    weakness left side    COPD (chronic obstructive pulmonary disease) ([facility])     Hip pain, right     Numbness and tingling of both legs     Thyroid disease       Procedure/Surgery:  none this admission  Precautions:  Falls, chronic L weakness, contact iso  Equipment Needs:  TBD, pt has FWW    SUBJECTIVE:    Pt admitted from hotel/government housing. Level entry and elevator available. Pt used FWW PTA.     OBJECTIVE:   Initial Evaluation  Date: 24 Treatment Short Term/ Long Term   Goals   AM-PAC 6 Clicks      Was pt agreeable to Eval/treatment? yes     Does pt have pain? No c/o pain     Bed Mobility  Rolling: Supervision   Supine to sit: Supervision   Sit to supine: Supervision   Scooting: Supervision   Rolling: Independent   Supine to sit: Independent   Sit to supine: Independent   Scooting: Independent    Transfers Sit to stand: SBA  Stand to sit: SBA  Stand pivot: SBA with WW  Sit to stand: Independent   Stand to sit: Independent   Stand pivot: mod I with WW   Ambulation    15 feet x2 with WW SBA  >150 feet with WW Mod I    Stair negotiation: ascended and descended  NT  TBD   ROM BUE:  Defer to OT note  BLE:  WFL     Strength BUE:  Defer to OT note  BLE:  LLE 3+/5, RLE 4/5  WNL   Balance Sitting EOB:  Supervision   Dynamic Standing:  SBA with WW  Sitting EOB:  Independent   Dynamic Standing:  Mod

## 2024-09-24 NOTE — DISCHARGE SUMMARY
[facility]   Discharge summary   Patient ID:  Clarisa Gunter  [id number]  69 y.o.  1955    Admit date: 9/22/2024    Discharge date and time: 9/24/2024    Admission Diagnoses:   Patient Active Problem List   Diagnosis    Cerebral atherosclerosis    Peripheral neuropathy due to ischemia    History of cerebral infarction    Right thalamic stroke (HCC)    Tobacco abuse    Peripheral neuropathy    Hypothyroidism    Muscle spasm    Acute CVA (cerebrovascular accident) (HCC)    Moderate protein-calorie malnutrition (HCC)    Hip fracture requiring operative repair, left, closed, initial encounter ([facility])    Fracture of unspecified part of neck of right femur, initial encounter for closed fracture (HCC)    Osteoarthritis of right hip, unspecified osteoarthritis type    Primary osteoarthritis of right hip    Closed fracture of femur, intertrochanteric, left, initial encounter ([facility])    Closed left hip fracture, initial encounter ([facility])    TIA (transient ischemic attack)    Hypokalemia    Nausea and vomiting    Diarrhea       Discharge Diagnoses: tia    Consults: none    Procedures: none    Hospital Course: The patient is a 69 y.o. female of Samuel Angulo DO     Patient is a 69-year-old female with a past medical history of CVA, tobacco abuse, peripheral neuropathy, hypothyroidism, protein calorie malnutrition, TIA who initially presented to the emergency room for left-sided weakness worse than her baseline from a previous stroke.  Patient underwent a CT head as well as ultrasound of the carotids which revealed no acute findings.  A CT abdomen pelvis was also obtained due to ongoing nausea vomiting and diarrhea at home for some time with findings suggesting of mild possible enteritis.  An MRI of the brain was obtained which revealed no acute infarcts and patient was feeling much better on day of discharge with no continued nausea vomiting or diarrhea.  Therapy saw and evaluated and patient felt she was safe    Where to Get Your Medications        These medications were sent to [id number] - [place], [place] - [address] - P [phone] - F [phone] [address], [place] [address]      Phone: [phone]   docusate sodium 100 MG capsule       Activity: activity as tolerated  Diet: cardiac diet    Pt has been advised to:    Follow-up with Samuel Angulo DO in 1 week.  Follow-up with consultants as recommended by them    Note that over 35 minutes was spent in preparing discharge papers, discussing discharge with patient, medication review, etc.    Signed:  KAMI Hernandez CNP  9/24/2024  1:48 PM    Above note edited to reflect my thoughts     I personally provided care for the patient. Radiographs, labs and medication list were reviewed by me independently.  The case was discussed in detail and plans for care were established. Review of SHIRIN Hernandez   , documentation was conducted and revisions were made as appropriate directly by me. I agree with the above documented exam, problem list, and plan of care.     Tish Rasmussen MD

## 2024-09-24 NOTE — PLAN OF CARE
Problem: Discharge Planning  Goal: Discharge to home or other facility with appropriate resources  Outcome: Progressing  Flowsheets (Taken 9/23/2024 2040)  Discharge to home or other facility with appropriate resources: Identify barriers to discharge with patient and caregiver     Problem: Risk for Elopement  Goal: Patient will not exit the unit/facility without proper excort  Outcome: Progressing  Flowsheets (Taken 9/23/2024 2040)  Nursing Interventions for Elopement Risk: Assist with personal care needs such as toileting, eating, dressing, as needed to reduce the risk of wandering     Problem: Skin/Tissue Integrity  Goal: Absence of new skin breakdown  Description: 1.  Monitor for areas of redness and/or skin breakdown  2.  Assess vascular access sites hourly  3.  Every 4-6 hours minimum:  Change oxygen saturation probe site  4.  Every 4-6 hours:  If on nasal continuous positive airway pressure, respiratory therapy assess nares and determine need for appliance change or resting period.  Outcome: Progressing     Problem: Safety - Adult  Goal: Free from fall injury  Outcome: Progressing

## 2024-09-24 NOTE — CARE COORDINATION
9/24/24 CM note.  PT admitted 9/23/24.  Discharge order noted (if tolerating PO diet). Pt lives home alone in apartment.  Independent with ADLs, walker for mobility.  Attends OP PT twice weekly.  Dr Angulo is PCP, [facility] preferred for short term meds.  Pt feels she is back to baseline and safe to return to prior living.  DC plan to home with grandson providing transportation   Tina SEGURA RN-BC  [phone]

## 2024-09-25 LAB
MICROORGANISM SPEC CULT: ABNORMAL
SERVICE CMNT-IMP: ABNORMAL
SPECIMEN DESCRIPTION: ABNORMAL

## 2024-10-01 NOTE — PROGRESS NOTES
Physician Progress Note      PATIENT:               DEBORAH BABCOCK  [facility] #:                  [id number]  :                       1955  ADMIT DATE:       2024 4:29 PM  DISCH DATE:        2024 5:08 PM  RESPONDING  PROVIDER #:        Tish Rasmussen MD          QUERY TEXT:    Pt admitted with TIA. Pt noted to have Hypokalemia and prior stroke. If   possible, please document in progress notes and discharge summary if you are   evaluating and /or treating any of the following:    The medical record reflects the following:  Risk Factors: Hypokalemia. hx CVA.  Clinical Indicators: Per notes, presented to ED with concern for dizziness and   weakness to the left arm, episode of confusion.   Patient symptoms seem to   have resolved.  Patient did have previous left-sided weakness when she had a   previous CVA.  Patient had reported the CVA was 4 years ago.  Patient states   that her weakness had resolved since the CVA. Patient given IV Zofran, IV   fluids.  Patient is found to have hypokalemia was given p.o. potassium   replacement.  CT head was unremarkable for acute process CT abdomen pelvis did   not show cause of patient's nausea vomiting diarr  Treatment: labs, imaging, p.o potassium    Thank You,  Deb Wagner, RN, BSN, CRCR, Clinical Documentation Improvement  Options provided:  -- TIA ruled out, symptoms due to hypokalemia  -- TIA ruled out, symptoms due to sequela of previous stroke  -- TIA ruled out, symptoms due to other specified, please add cause  -- TIA confirmed, unable to further specify  -- Other - I will add my own diagnosis  -- Disagree - Not applicable / Not valid  -- Disagree - Clinically unable to determine / Unknown  -- Refer to Clinical Documentation Reviewer    PROVIDER RESPONSE TEXT:    TIA ruled out, symptoms due to hypokalemia    Query created by: Deb Wagner on 2024 11:00 AM      Electronically signed by:  Tish Rasmussen MD 10/1/2024 8:01 AM

## 2024-10-10 ENCOUNTER — EVALUATION (OUTPATIENT)
Dept: PHYSICAL THERAPY | Age: 69
End: 2024-10-10
Payer: MEDICARE

## 2024-10-10 DIAGNOSIS — M50.30 DDD (DEGENERATIVE DISC DISEASE), CERVICAL: ICD-10-CM

## 2024-10-10 DIAGNOSIS — M43.02 SPONDYLOLYSIS OF CERVICAL REGION: Primary | ICD-10-CM

## 2024-10-10 PROCEDURE — 97110 THERAPEUTIC EXERCISES: CPT | Performed by: PHYSICAL THERAPIST

## 2024-10-10 PROCEDURE — 97161 PT EVAL LOW COMPLEX 20 MIN: CPT | Performed by: PHYSICAL THERAPIST

## 2024-10-10 NOTE — PROGRESS NOTES
Fair  [] Poor    Patient Requires Follow-up: [x] Yes  [] No    Plan:   [] Continue per plan of care [] Alter current plan (see comments)  [x] Plan of care initiated [] Hold pending MD visit [] Discharge  Plan for Next Session:        Electronically signed by:  Blanca Vargas, PT, [id number]    
conditioning  [x] Manual Therapy  [] Massage/Fascial release   [] Work/Sport specific activities    [] Pain Neuroscience [x] Cold/hotpack  [] Vasocompression  [x] Electrical Stimulation  [] Lumbar/Cervical Traction  [x] Ultrasound   [] Iontophoresis: 4 mg/mL Dexamethasone Sodium Phosphate 40-80 mAmin  [] Dry Needling      [x] Instruction in HEP      []  Medication allergies reviewed for use of Dexamethasone Sodium Phosphate 4mg/ml  with iontophoresis treatments.  Patient is not allergic.      The following CPT codes are likely to be used in the care of this patient: 02151 PT Evaluation: Low Complexity, 42735 PT Re-Evaluation, 47334 Therapeutic Exercise, 92193 Neuromuscular Re-Education, 96026 Therapeutic Activities, 79242 Manual Therapy, 97889 Electric Stimulation, and 85566 Ultrasound      Suggested Professional Referral: [x] No  [] Yes:     Patient Education:  [x] Plans/Goals, Risks/Benefits discussed  [x] Home exercise program  Method of Education: [x] Verbal  [x] Demo  [x] Written  Comprehension of Education:  [x] Verbalizes understanding.  [x] Demonstrates understanding.  [] Needs Review.  [] Demonstrates/verbalizes understanding of HEP/Ed previously given.    Frequency:  2 days per week for 4 weeks    Patient understands diagnosis/prognosis and consents to treatment, plan and goals: [x] Yes    [] No     Thank you for the opportunity to work with your patient.  If you have questions or comments, please contact me at number listed above.    Electronically signed by: Blanca Vargas, PT, [id number]    Medicare Patients Only     Please sign Physician's Certification and return to:  [facility] PHYSICAL THERAPY  [facility] [address]  [address]LO[address]  [facility] [address]  Dept: [phone]  Dept Fax: [phone]  Loc: [phone]     Physician's Certification / Comments     Frequency/Duration 2 days per week for 4 weeks.   Certification period from 10/10/2024  to

## 2024-10-16 ENCOUNTER — TREATMENT (OUTPATIENT)
Dept: PHYSICAL THERAPY | Age: 69
End: 2024-10-16
Payer: COMMERCIAL

## 2024-10-16 DIAGNOSIS — M50.30 DDD (DEGENERATIVE DISC DISEASE), CERVICAL: ICD-10-CM

## 2024-10-16 DIAGNOSIS — M43.02 SPONDYLOLYSIS OF CERVICAL REGION: Primary | ICD-10-CM

## 2024-10-16 PROCEDURE — 97110 THERAPEUTIC EXERCISES: CPT

## 2024-10-16 NOTE — PROGRESS NOTES
limited by other medical complications  [] Other:     Prognosis: [x] Good [] Fair  [] Poor    Patient Requires Follow-up: [x] Yes  [] No    Plan:   [x] Continue per plan of care [] Alter current plan (see comments)  [] Plan of care initiated [] Hold pending MD visit [] Discharge  Plan for Next Session:        Electronically signed by:  Sameera Javier, PTA [id number]

## 2024-10-24 ENCOUNTER — TREATMENT (OUTPATIENT)
Dept: PHYSICAL THERAPY | Age: 69
End: 2024-10-24
Payer: COMMERCIAL

## 2024-10-24 DIAGNOSIS — M43.02 SPONDYLOLYSIS OF CERVICAL REGION: Primary | ICD-10-CM

## 2024-10-24 DIAGNOSIS — M50.30 DDD (DEGENERATIVE DISC DISEASE), CERVICAL: ICD-10-CM

## 2024-10-24 PROCEDURE — 97110 THERAPEUTIC EXERCISES: CPT

## 2024-10-24 NOTE — PROGRESS NOTES
[facility]          Phone: [phone] Fax: [phone]    Physical Therapy Daily Treatment Note  Date:  10/24/2024    Patient Name:  Clarisa Gunter    :  1955  MRN: [id number]    Evaluating Therapist:  Blanca Vargas, PT [id number]    Restrictions/Precautions:    Diagnosis:     Diagnosis Orders   1. Spondylolysis of cervical region        2. DDD (degenerative disc disease), cervical          Treatment Diagnosis:    Insurance/Certification information:  [facility]  Referring Physician:  Samuel Angulo DO  Plan of care signed (Y/N):    Visit# / total visits:    Pain level: 1/10   Time In:  945  Time Out:  1023    Subjective:  pt had no new reports    Exercises:  Exercise/Equipment Resistance/Repetitions Other comments     UBE 10 mins      Upper trap stretch 20 sec x 2 reps B      Levator scap stretch 20 sec x 2 reps B      Scapular retraction 5 sec x 10 reps      rows YTB x 10 reps  seated     Shoulder ext YTB x 10 reps  seated     Shoulder IR/ER YTB x 10 reps  seated     Shoulder horizontal abd YTB x 10 reps  seated     Chin tucks 3 sec x 10 reps        Cervical rotation  10 sec x 3 reps B        Cervical flex/ext  X 10 reps each                                                              Other Therapeutic Activities:  pt performed all TE slowly , cuing for form.  Pt was provided with additional HEP     Home Exercise Program:  10/10/24 - upper trap stretch, levator scap stretch, scapular retraction.  10/16/24 rows and sh ext seated.  YTB provided 10/24/24 cervical rotation, cervical flex /ext    Manual Treatments:  N/A    Modalities:  IFC PRN     Time-in Time-out Total Time   22636  Evaluation Low Complexity      93391  Evaluation Med Complexity      24358  Evaluation High Complexity      83747  Ther Ex 945 1023 38   41918  Neuro Re-ed        99599  Ther Activities        03754  Manual Therapy       09011  E-stim       20746  Ultrasound            Session 945 1023

## 2024-12-06 ENCOUNTER — HOSPITAL ENCOUNTER (OUTPATIENT)
Age: 69
Discharge: HOME | End: 2024-12-08

## 2024-12-06 PROCEDURE — 88305 TISSUE EXAM BY PATHOLOGIST: CPT

## 2024-12-06 PROCEDURE — 87102 FUNGUS ISOLATION CULTURE: CPT

## 2024-12-06 PROCEDURE — 87106 FUNGI IDENTIFICATION YEAST: CPT

## 2024-12-11 LAB — SURGICAL PATHOLOGY REPORT: NORMAL

## 2024-12-12 LAB
MICROORGANISM SPEC CULT: NORMAL
MICROORGANISM/AGENT SPEC: NORMAL
SPECIMEN DESCRIPTION: NORMAL

## 2024-12-13 LAB
MICROORGANISM SPEC CULT: ABNORMAL
MICROORGANISM/AGENT SPEC: ABNORMAL
SPECIMEN DESCRIPTION: ABNORMAL

## 2024-12-22 LAB
MICROORGANISM SPEC CULT: NORMAL
MICROORGANISM/AGENT SPEC: NORMAL
SPECIMEN DESCRIPTION: NORMAL

## 2024-12-29 LAB
MICROORGANISM SPEC CULT: NORMAL
MICROORGANISM/AGENT SPEC: NORMAL
SPECIMEN DESCRIPTION: NORMAL

## 2025-01-05 LAB
MICROORGANISM SPEC CULT: NORMAL
MICROORGANISM/AGENT SPEC: NORMAL
SPECIMEN DESCRIPTION: NORMAL

## 2025-01-07 LAB
MICROORGANISM SPEC CULT: NORMAL
MICROORGANISM/AGENT SPEC: NORMAL
SPECIMEN DESCRIPTION: NORMAL

## 2025-01-27 ENCOUNTER — HOSPITAL ENCOUNTER (OUTPATIENT)
Dept: CT IMAGING | Age: 70
Discharge: HOME | End: 2025-01-29
Attending: FAMILY MEDICINE
Payer: COMMERCIAL

## 2025-01-27 DIAGNOSIS — F17.210 CIGARETTE SMOKER: ICD-10-CM

## 2025-01-27 DIAGNOSIS — Z12.2 ENCOUNTER FOR SCREENING FOR MALIGNANT NEOPLASM OF RESPIRATORY ORGANS: ICD-10-CM

## 2025-01-27 PROCEDURE — 71271 CT THORAX LUNG CANCER SCR C-: CPT

## (undated) DEVICE — PADDING CAST W6INXL4YD COT LO LINTING WYTEX

## (undated) DEVICE — 4-PORT MANIFOLD: Brand: NEPTUNE 2

## (undated) DEVICE — DRAPE,REIN 53X77,STERILE: Brand: MEDLINE

## (undated) DEVICE — BANDAGE,GAUZE,BULKEE II,4.5"X4.1YD,STRL: Brand: MEDLINE

## (undated) DEVICE — DOUBLE BASIN SET: Brand: MEDLINE INDUSTRIES, INC.

## (undated) DEVICE — TUBING, SUCTION, 9/32" X 10', STRAIGHT: Brand: MEDLINE

## (undated) DEVICE — PACK PROCEDURE SURG GEN CUST

## (undated) DEVICE — SOLUTION IV IRRIG WATER 1000ML POUR BRL 2F7114

## (undated) DEVICE — 3M™ TEGADERM™ TRANSPARENT FILM DRESSING FRAME STYLE, 1626W, 4 IN X 4-3/4 IN (10 CM X 12 CM), 50/CT 4CT/CASE: Brand: 3M™ TEGADERM™

## (undated) DEVICE — GAUZE,SPONGE,4"X4",8PLY,STRL,LF,10/TRAY: Brand: MEDLINE

## (undated) DEVICE — GLOVE ORANGE PI 8   MSG9080

## (undated) DEVICE — TAPE ADH W3INXL10YD WHT COT WVN BK POWERFUL RUB BASE HIGHLY

## (undated) DEVICE — GLOVE SURG SZ 6 THK91MIL LTX FREE SYN POLYISOPRENE ANTI

## (undated) DEVICE — Device: Brand: COVER, PERINEAL POST, 12 PK

## (undated) DEVICE — SYRINGE MED 30ML STD CLR PLAS LUERLOCK TIP N CTRL DISP

## (undated) DEVICE — BIT DRL L330MM DIA4.2MM CALIB L100MM ST 3 FLUT QUIK CPL FOR

## (undated) DEVICE — NEEDLE FLTR 18GA L1.5IN MEM THK5UM BLNT DISP

## (undated) DEVICE — Device: Brand: PROTECTORS, LEG SPAR BALL JOINT, 12/PR

## (undated) DEVICE — TELFA ADHESIVE ISLAND DRESSING: Brand: TELFA

## (undated) DEVICE — ADHESIVE SKIN CLSR 0.7ML TOP DERMBND ADV

## (undated) DEVICE — K-WIRE
Type: IMPLANTABLE DEVICE | Site: HIP | Status: NON-FUNCTIONAL
Brand: PRO
Removed: 2022-12-28

## (undated) DEVICE — 3M™ STERI-DRAPE™ U-DRAPE 1015: Brand: STERI-DRAPE™

## (undated) DEVICE — 2108 SERIES SAGITTAL BLADE, OFFSET (20.0 X 0.89 X 80.0MM)

## (undated) DEVICE — DRESSING,GAUZE,XEROFORM,CURAD,1"X8",ST: Brand: CURAD

## (undated) DEVICE — BIT DRL L500MM DIA6X9MM CANN STP L QUIK CPL FOR DH DC TFN

## (undated) DEVICE — COVER HNDL LT DISP

## (undated) DEVICE — 1000 S-DRAPE TOWEL DRAPE 10/BX: Brand: STERI-DRAPE™

## (undated) DEVICE — TRAY LAMBOTS REUSABLE

## (undated) DEVICE — TOWEL,OR,DSP,ST,BLUE,STD,6/PK,12PK/CS: Brand: MEDLINE

## (undated) DEVICE — GLOVE ORTHO 8   MSG9480

## (undated) DEVICE — GLOVE SURG SZ 8 CRM LTX FREE POLYISOPRENE POLYMER BEAD ANTI

## (undated) DEVICE — Device

## (undated) DEVICE — INSTRUMENT SYSTEM 7 BATTERY REUSABLE

## (undated) DEVICE — KIT SURG W7XL11IN 2 PKT UNTREATED NA

## (undated) DEVICE — BLADE,STAINLESS-STEEL,10,STRL,DISPOSABLE: Brand: MEDLINE

## (undated) DEVICE — INTENDED FOR TISSUE SEPARATION, AND OTHER PROCEDURES THAT REQUIRE A SHARP SURGICAL BLADE TO PUNCTURE OR CUT.: Brand: BARD-PARKER ® STAINLESS STEEL BLADES

## (undated) DEVICE — SET ORTHO STD STORTSTD1

## (undated) DEVICE — BNDG,ELSTC,MATRIX,STRL,4"X5YD,LF,HOOK&LP: Brand: MEDLINE

## (undated) DEVICE — SPONGE LAP W18XL18IN WHT COT 4 PLY FLD STRUNG RADPQ DISP ST

## (undated) DEVICE — COVER,BOOT,FOAM,NON-SKID,HOOK-LOOP,XLG: Brand: MEDLINE INDUSTRIES, INC.

## (undated) DEVICE — GUIDEWIRE ORTH L400MM DIA3.2MM FOR TFN

## (undated) DEVICE — PAD PERINL POST FOAM DISPOSABLE FOR AMSCO SURG TBL

## (undated) DEVICE — STRIP,CLOSURE,WOUND,MEDI-STRIP,1/2X4: Brand: MEDLINE

## (undated) DEVICE — BIT DRL L5IN DIA2.4MM STD ST S STL TWST BUSA

## (undated) DEVICE — TRAY SYSTEM 7 DRILL REUSABLE

## (undated) DEVICE — CHLORAPREP 26ML ORANGE

## (undated) DEVICE — NEEDLE HYPO 22GA L1.5IN BLK POLYPR HUB S STL REG BVL STR

## (undated) DEVICE — GOWN,SIRUS,FABRNF,L,20/CS: Brand: MEDLINE

## (undated) DEVICE — DRESSING COMP W4XL4IN N ADH PD W2.5XL2.5IN GZ BORDERED ADH

## (undated) DEVICE — GLOVE SURG SZ 8 L12IN FNGR THK79MIL GRN LTX FREE

## (undated) DEVICE — ELECTRODE PT RET AD L9FT HI MOIST COND ADH HYDRGEL CORDED

## (undated) DEVICE — 3M™ IOBAN™ 2 ANTIMICROBIAL INCISE DRAPE 6651EZ: Brand: IOBAN™ 2

## (undated) DEVICE — PILLOW POS W15XH6XL22IN RASPBERRY FOAM ABD W/ STRP DISP FOR

## (undated) DEVICE — GOWN,SIRUS,FABRNF,XL,20/CS: Brand: MEDLINE

## (undated) DEVICE — DRAPE,TOP,102X53,STERILE: Brand: MEDLINE

## (undated) DEVICE — READY WET SKIN SCRUB TRAY-LF: Brand: MEDLINE INDUSTRIES, INC.

## (undated) DEVICE — INSTRUMENT CURRETTES REUSABLE

## (undated) DEVICE — PEEL-AWAY HOOD: Brand: FLYTE, SURGICOOL

## (undated) DEVICE — TOTAL HIP PK

## (undated) DEVICE — PIN BNE FIX TEMP L140MM DIA4MM MAKO

## (undated) DEVICE — SYRINGE,CONTROL,LL,FINGER,GRIP: Brand: MEDLINE INDUSTRIES, INC.

## (undated) DEVICE — Z DISCONTINUED NEEDLE HYPO 22GA L1.5IN BLK POLYPR HUB S STL REG BVL STR - SEE COMMENT

## (undated) DEVICE — SOLUTION IRRIG 3000ML 0.9% SOD CHL USP UROMATIC PLAS CONT

## (undated) DEVICE — DRAPE ISOLATN PT ST W/POCKET

## (undated) DEVICE — 3M™ COBAN™ NL STERILE NON-LATEX SELF-ADHERENT WRAP, 2084S, 4 IN X 5 YD (10 CM X 4,5 M), 18 ROLLS/CASE: Brand: 3M™ COBAN™

## (undated) DEVICE — DRAPE C ARM W41XL74IN UNIV MOB W RUBBERBAND CLP

## (undated) DEVICE — HEWSON SUTURE RETRIEVER: Brand: HEWSON SUTURE RETRIEVER

## (undated) DEVICE — PADDING,UNDERCAST,COTTON, 4"X4YD STERILE: Brand: MEDLINE

## (undated) DEVICE — CANNULATED DRILL

## (undated) DEVICE — WIPES SKIN CLOTH READYPREP 9 X 10.5 IN 2% CHLORHEX GLUCONATE CHG PREOP

## (undated) DEVICE — C-ARM: Brand: UNBRANDED

## (undated) DEVICE — GLOVE SURG SZ 65 THK91MIL LTX FREE SYN POLYISOPRENE

## (undated) DEVICE — GLOVE ORANGE PI 7   MSG9070

## (undated) DEVICE — THREADED GUIDE WIRE

## (undated) DEVICE — KIT DRP FOR RIO ROBOTIC ARM ASST SYS

## (undated) DEVICE — SOLUTION IV IRRIG POUR BRL 0.9% SODIUM CHL 2F7124

## (undated) DEVICE — NEEDLE,22GX1.5",REG,BEVEL: Brand: MEDLINE

## (undated) DEVICE — SPONGE LAP W18XL18IN WHT COT 4 PLY FLD STRUNG RADPQ DISP ST 2 PER PACK

## (undated) DEVICE — TUBE IRRIG HNDPC HI FLO TP INTRPULS W/SUCTION TUBE

## (undated) DEVICE — DRESSING HYDROFIBER AQUACEL AG ADVANTAGE 3.5X10 IN

## (undated) DEVICE — KIT TRK HIP PROC VIZADISC

## (undated) DEVICE — GLOVE SURG SZ 65 L12IN FNGR THK79MIL GRN LTX FREE

## (undated) DEVICE — APPLICATOR MEDICATED 26 CC SOLUTION HI LT ORNG CHLORAPREP

## (undated) DEVICE — KIT SURG PREP POVIDONE IOD PRESATURATED PAINT WET FOR UNIV

## (undated) DEVICE — BIT DRL L L266MM DIA16MM FEM FLX CANN QUIK CPL

## (undated) DEVICE — BLADE ES L6IN ELASTOMERIC COAT EXT DURABLE BEND UPTO 90DEG

## (undated) DEVICE — PADDING UNDERCAST W6INXL4YD WYTEX 6 PER BG

## (undated) DEVICE — SUTURE STRATAFIX SYMMETRIC PDS + SZ 1 L18IN ABSRB VLT OS-6 SXPP1A201